# Patient Record
Sex: FEMALE | Race: BLACK OR AFRICAN AMERICAN | Employment: STUDENT | URBAN - NONMETROPOLITAN AREA
[De-identification: names, ages, dates, MRNs, and addresses within clinical notes are randomized per-mention and may not be internally consistent; named-entity substitution may affect disease eponyms.]

---
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
## 2021-06-24 ENCOUNTER — NURSE ONLY (OUTPATIENT)
Dept: CARDIOLOGY CLINIC | Age: 21
End: 2021-06-24
Payer: COMMERCIAL

## 2021-06-24 DIAGNOSIS — Z02.5 SPORTS PHYSICAL: Primary | ICD-10-CM

## 2021-06-24 PROCEDURE — 93000 ELECTROCARDIOGRAM COMPLETE: CPT | Performed by: INTERNAL MEDICINE

## 2021-07-21 ENCOUNTER — HOSPITAL ENCOUNTER (OUTPATIENT)
Dept: PHYSICAL THERAPY | Age: 21
Setting detail: THERAPIES SERIES
Discharge: HOME | End: 2021-07-21
Payer: COMMERCIAL

## 2021-07-21 PROCEDURE — L3020 FOOT LONGITUD/METATARSAL SUP: HCPCS

## 2021-07-21 NOTE — PLAN OF CARE
Jeet Langley  Phone: [phone]   Fax:     [phone]                                                       Physical Therapy Certification    Dear Referring Practitioner: Joaquin Larios MD,    We had the pleasure of evaluating the following patient for physical therapy services at [address] [place] [address]. A summary of our findings can be found in the initial assessment below. This includes our plan of care. If you have any questions or concerns regarding these findings, please do not hesitate to contact me at the office phone number checked above. Thank you for the referral.       Physician Signature:_______________________________Date:__________________  By signing above (or electronic signature), therapists plan is approved by physician      Patient: Katherine Buenrostro   : 2000   MRN: [id number]  Referring Physician: Referring Practitioner: Joaquin Larios MD      Evaluation Date: 2021      Medical Diagnosis Information:  Diagnosis: bilateral knee pain  Treatment Diagnosis: M76.50                                         Insurance information: PT Insurance Information: BCBS/AG/[place]     Precautions/ Contra-indications:   Latex Allergy:  [x]NO      []YES  Preferred Language for Healthcare:   [x]English       []other:  Functional Scale: LEFS 35%    SUBJECTIVE: Patient presents back to clinic stating that she is having increased bilateral knee pain after return to basketball workouts. She states that she had DN previously with good relief however, recently she has not had much treatment in the training room. She reports that her orthotics help significantly and that she has worn down the inside of the top cover and could use a new device. She admits that she has the most knee pain in left side recently, through patellar tendon. (ROS):  [x]Performed Review of systems (Integumentary, CardioPulmonary, Neurological) by intake and observation. Intake form has been scanned into medical record. Patient has been instructed to contact their primary care physician regarding ROS issues if not already being addressed at this time. Co-morbidities/Complexities (which will affect course of rehabilitation):   [x]None           Arthritic conditions   []Rheumatoid arthritis (M05.9)  []Osteoarthritis (M19.91)   Cardiovascular conditions   []Hypertension (I10)  []Hyperlipidemia (E78.5)  []Angina pectoris (I20)  []Atherosclerosis (I70)   Musculoskeletal conditions   []Disc pathology   []Congenital spine pathologies   []Prior surgical intervention  []Osteoporosis (M81.8)  []Osteopenia (M85.8)   Endocrine conditions   []Hypothyroid (E03.9)  []Hyperthyroid Gastrointestinal conditions   []Constipation (X95.09)   Metabolic conditions   []Morbid obesity (E66.01)  []Diabetes type 1(E10.65) or 2 (E11.65)   []Neuropathy (G60.9)     Pulmonary conditions   []Asthma (J45)  []Coughing   []COPD (J44.9)   Psychological Disorders  []Anxiety (F41.9)  []Depression (F32.9)   []Other:   []Other:        Barriers to/and or personal factors that will affect rehab potential:              []Age  []Sex              []Motivation/Lack of Motivation                        []Co-Morbidities              []Cognitive Function, education/learning barriers              []Environmental, home barriers              []profession/work barriers  []past PT/medical experience  []other:  Justification:     Falls Risk Assessment (30 days):   [x] Falls Risk assessed and no intervention required. [] Falls Risk assessed and Patient requires intervention due to being higher risk   TUG score (>12s at risk):     [] Falls education provided, including           ASSESSMENT: Patient demonstrates poor neuromuscular control of the pelvis and proximal hip.  She has developed compensatory patterns beginning at the foot ankle due to moderate over and accelerated pronation mechanics. She would benefit from fabrication of new custom device to address mechanics and improve kinetic chain efficiency and decrease risk of valgus knee injury. She would also benefit from some higher level NMR work to improve proximal control and DN to decrease inflammation of the knees.       Functional deficiencies/Impairments:     [x]Decreased core/proximal hip strength and neuromuscular control -Reduced overall functional level with mobility and lifting    []Noted foot/ankle hypo/hypermobility - Reduced overall functional level with mobility and gait    []Decreased LE functional strength- Reduced overall functional mobility   [x]Reduced balance/proprioceptive control- Reduced overall functional level with mobility and gait, possible falls risk   []Pain/difficulty with ambulation- Reduced overall functional mobility   [x]Unable to perform sport/recreational activity due to pain / dysfunction   [x]Foot biomechanics requiring correction:uncompensated forefoot varus    Classification :    [x]Signs/symptoms consistent with foot mechanics/gait dysfunction which would benefit from custom orthotic device fabrication    []Signs/symptoms consistent with functional hip weakness/NMR control      []Signs/symptoms consistent with tendinitis/tendinosis    [x]signs/symptoms consistent with pathology which requires additional therapeutic intervention in addition to custom orthotics       []other:      Prognosis/Rehab Potential:      [x]Excellent   []Good    []Fair   []Poor    Physical Therapy Evaluation Complexity Justification  [x] A history of present problem with:  [x] no personal factors and/or comorbidities that impact the plan of care;  []1-2 personal factors and/or comorbidities that impact the plan of care  []3 personal factors and/or comorbidities that impact the plan of care  [x] An examination of body systems using standardized tests and measures

## 2021-07-28 ENCOUNTER — HOSPITAL ENCOUNTER (OUTPATIENT)
Dept: PHYSICAL THERAPY | Age: 21
Setting detail: THERAPIES SERIES
Discharge: HOME | End: 2021-07-28
Payer: COMMERCIAL

## 2021-07-28 PROCEDURE — 97110 THERAPEUTIC EXERCISES: CPT

## 2021-07-28 PROCEDURE — 97161 PT EVAL LOW COMPLEX 20 MIN: CPT

## 2021-07-28 PROCEDURE — 20560 NDL INSJ W/O NJX 1 OR 2 MUSC: CPT

## 2021-07-28 NOTE — PLAN OF CARE
Jeet Langley  Phone: [phone]   Fax:     [phone]                                                       Physical Therapy Certification    Dear Referring Practitioner: Nathaniel Cullen MD,    We had the pleasure of evaluating the following patient for physical therapy services at [address] [place] [address]. A summary of our findings can be found in the initial assessment below. This includes our plan of care. If you have any questions or concerns regarding these findings, please do not hesitate to contact me at the office phone number checked above. Thank you for the referral.       Physician Signature:_______________________________Date:__________________  By signing above (or electronic signature), therapists plan is approved by physician      Patient: Stan Roberto   : 2000   MRN: [id number]  Referring Physician: Referring Practitioner: Nathaniel Cullen MD      Evaluation Date: 2021      Medical Diagnosis Information:  Diagnosis: bilateral knee PFD   Treatment Diagnosis: M25.561, M25.562                                         Insurance information: PT Insurance Information: BCBS/AG/[place] 60 visits     Precautions/ Contra-indications: none  Latex Allergy:  [x]NO      []YES  Preferred Language for Healthcare:   [x]English       []other:    C-SSRS Triggered by Intake questionnaire (Past 2 wk assessment ):   [x] No, Questionnaire did not trigger screening.   [] Yes, Patient intake triggered C-SSRS Screening      [] C-SSRS Screening completed  [] PCP notified via Epic     SUBJECTIVE: Patient stated complaint of bilateral knee pain for quite some time. 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  She reports that DN and treatment with ATC have helped in the past. We just evaluated her feet for new orthotic devices which help some with knee pain. Relevant Medical History:   Functional Scale/Score: LEFS 42%    Pain Scale: 6/10  Easing factors: rest, ice  Provocative factors: running, jumping, change of direction, stairs     Type: []Constant   [x]Intermittent  []Radiating []Localized []other:     Numbness/Tingling: none    Occupation/School: student    Living Status/Prior Level of Function: Independent with ADLs and IADLs,  Collegiate basketball    OBJECTIVE:     ROM LEFT RIGHT   HIP Flex [facility] WFL   HIP Abd [facility] WFL   HIP Ext [facility] WFL   HIP IR WFL WFL   HIP ER [facility] WFL   Knee ext     Knee Flex     Ankle PF     Ankle DF     Ankle In     Ankle Ev     Strength  LEFT RIGHT   HIP Flexors     HIP Abductors 4+/5 4+/5   HIP Ext     Hip ER     Knee EXT (quad) 5/5 with pain to test 5/5 with pain to test   Knee Flex (HS)     Ankle DF     Ankle PF     Ankle Inv     Ankle EV          Circumference  Mid apex  7 cm prox             Reflexes/Sensation:    [x]Dermatomes/Myotomes intact    [x]Reflexes equal and normal bilaterally   []Other:    Joint mobility: PF   []Normal    []Hypo   [x]Hyper    Palpation: tender through inferior pole of patella and quad tendon as well as through lateral quad and ITB    Functional Mobility/Transfers: deep squat - functional/non-painful, SLS - functional    Posture: WFL    Bandages/Dressings/Incisions:  NA    Gait: (include devices/WB status) normal    Orthopedic Special Tests:                        [x] Patient history, allergies, meds reviewed. Medical chart reviewed. See intake form. Review Of Systems (ROS):  [x]Performed Review of systems (Integumentary, CardioPulmonary, Neurological) by intake and observation. Intake form has been scanned into medical record. Patient has been instructed to contact their primary care physician regarding ROS issues if not already being addressed at this time.       Co-morbidities/Complexities (which will affect course of rehabilitation):   [x]None           Arthritic conditions   []Rheumatoid arthritis (M05.9)  []Osteoarthritis (M19.91)   Cardiovascular conditions   []Hypertension (I10)  []Hyperlipidemia (E78.5)  []Angina pectoris (I20)  []Atherosclerosis (I70)  []CVA Musculoskeletal conditions   []Disc pathology   []Congenital spine pathologies   []Prior surgical intervention  []Osteoporosis (M81.8)  []Osteopenia (M85.8)   Endocrine conditions   []Hypothyroid (E03.9)  []Hyperthyroid Gastrointestinal conditions   []Constipation (K60.71)   Metabolic conditions   []Morbid obesity (E66.01)  []Diabetes type 1(E10.65) or 2 (E11.65)   []Neuropathy (G60.9)     Pulmonary conditions   []Asthma (J45)  []Coughing   []COPD (J44.9)   Psychological Disorders  []Anxiety (F41.9)  []Depression (F32.9)   []Other:   []Other:          Barriers to/and or personal factors that will affect rehab potential:              []Age  []Sex    []Smoker              []Motivation/Lack of Motivation                        []Co-Morbidities              []Cognitive Function, education/learning barriers              []Environmental, home barriers              []profession/work barriers  []past PT/medical experience  []other:  Justification:     Falls Risk Assessment (30 days):   [x] Falls Risk assessed and no intervention required. [] Falls Risk assessed and Patient requires intervention due to being higher risk   TUG score (>12s at risk):     [] Falls education provided, including         ASSESSMENT: Patient presents with signs and symptoms consistent with bilateral patellar tendonitis and PF tracking issues. She would benefit from skilled PT services to address above stated limitations and improve functional abilities.      Functional Impairments:     []Noted lumbar/proximal hip/LE hypomobility   [x]Decreased LE functional ROM   [x]Decreased core/proximal hip strength and neuromuscular control   [x]Decreased LE functional strength   [x]Reduced balance/proprioceptive plan of care;  []1-2 personal factors and/or comorbidities that impact the plan of care  []3 personal factors and/or comorbidities that impact the plan of care  [x] An examination of body systems using standardized tests and measures addressing any of the following: body structures and functions (impairments), activity limitations, and/or participation restrictions;:  [x] a total of 1-2 or more elements   [] a total of 3 or more elements   [] a total of 4 or more elements   [x] A clinical presentation with:  [x] stable and/or uncomplicated characteristics   [] evolving clinical presentation with changing characteristics  [] unstable and unpredictable characteristics;   [x] Clinical decision making of [x] low, [] moderate, [] high complexity using standardized patient assessment instrument and/or measurable assessment of functional outcome. [x] EVAL (LOW) 68955 (typically 20 minutes face-to-face)  [] EVAL (MOD) 06650 (typically 30 minutes face-to-face)  [] EVAL (HIGH) 57132 (typically 45 minutes face-to-face)  [] RE-EVAL     PLAN:  Frequency/Duration:  2 days per week for 4 Weeks:  Interventions:  [x]  Therapeutic exercise including: strength training, ROM, for Lower extremity and core   [x]  NMR activation and proprioception for LE, Glutes and Core   [x]  Manual therapy as indicated for LE, Hip and spine to include: Dry Needling/IASTM, STM, PROM, Gr I-IV mobilizations, manipulation. [x] Modalities as needed that may include: thermal agents, E-stim, Biofeedback, US, iontophoresis as indicated  [x] Patient education on joint protection, postural re-education, activity modification, progression of HEP. HEP instruction: (see scanned forms)    GOALS:  Patient stated goal: pain free basketball  [] Progressing: [] Met: [] Not Met: [] Adjusted    Therapist goals for Patient:   Short Term Goals: To be achieved in: 2 weeks  1. Independent in HEP and progression per patient tolerance, in order to prevent re-injury. [] Progressing: [] Met: [] Not Met: [] Adjusted  2. Patient will have a decrease in pain to facilitate improvement in movement, function, and ADLs as indicated by Functional Deficits. [] Progressing: [] Met: [] Not Met: [] Adjusted    Long Term Goals: To be achieved in: 4-6 weeks  1. Disability index score of 5% or less for the LEFS to assist with reaching prior level of function. [] Progressing: [] Met: [] Not Met: [] Adjusted  2. Patient will demonstrate increased AROM to [facility] to allow for proper joint functioning as indicated by patients Functional Deficits. [] Progressing: [] Met: [] Not Met: [] Adjusted  3. Patient will demonstrate an increase in Strength to good proximal hip strength and control, within 5lb HHD in LE to allow for proper functional mobility as indicated by patients Functional Deficits. [] Progressing: [] Met: [] Not Met: [] Adjusted  4. Patient will return to running, jumping and change of direction functional activities without increased symptoms or restriction.    [] Progressing: [] Met: [] Not Met: [] Adjusted       Electronically signed by:  Yee Torres, PT

## 2021-07-28 NOTE — FLOWSHEET NOTE
[facility]  [address] [facility]  Phone: [phone] Fax: [phone]    Physical Therapy Treatment Note/ Progress Report:     Date:  2021    Patient Name:  Case Fernandes    :  2000  MRN: [id number]  Restrictions/Precautions:    Medical/Treatment Diagnosis Information:  · Diagnosis: bilateral knee PFD  · Treatment Diagnosis: M25.561, E90.710  Insurance/Certification information:  PT Insurance Information: BCBS/AG/[place] 60 visits  Physician Information:  Referring Practitioner: Hilda Tang MD  Plan of care signed (Y/N):     Date of Patient follow up with Physician:      Progress Report: []  Yes  []  No     Date Range for reporting period:  Beginnin2021  Ending:      Progress report due (10 Rx/or 30 days whichever is less):      Recertification due (POC duration/ or 90 days whichever is less): 10/28/2021     Visit # Insurance Allowable Auth Needed   1  []Yes   []No     Latex Allergy:  [x]NO      []YES  Preferred Language for Healthcare:   [x]English       []other:  Functional Scale: LEFS 42%  Date assessed:2021    Pain level:  4/10     SUBJECTIVE:  See eval    OBJECTIVE: See eval   Observation:    Test measurements:      RESTRICTIONS/PRECAUTIONS:     Exercises/Interventions:     Therapeutic Ex (50309)   Min: 10' Sets/sec Reps Notes/CUES   Retro Stepper/BIKE      Alter G      BFR      Sportcord March      3 way SLR      SAQ      Clam ABD      Hip Ext Curlie Bent      BOSU fwd/side lunge      BOSU squat      Leg Press ball squeeze 2 10    SLE with ER 2 10    Prone stretch 20\" 2    Glute side walks      RDL      Slide Lunge      Slide HS eccentrics      Step up      Swissball wall rolls- in SLS- hip drive      Quad hip ext/wall-ball rolls                  Manual Intervention (93018)  Min:      Knee mobs/PROM      Tib/Fem Mobs      Patella Mobs      Ankle mobs            DN 15'     NMR re-education (96685)  Min:   CUES NEEDED   Fijian/Biofeedback 10/10      BFR      G. Med activation      Hip Ext full ROM/ G. Activation      Bosu Bal and Prop- G Med      Single leg stance/Balance/Prop      Bosu Retro G. Med act      Prone Hip froggers- sliders/elevated            Therapeutic Activity (44891)  Min:      Ladders      Plyos      Dynamic Balance                        Spoke with   regarding the use of Dry Needling     Dry needling manual therapy: consisted on the placement of 7 needles in the following muscles:  lateral quad, ITB, rectus, inferior patella. A 30-60 mm needle was inserted, piston, rotated, and coned to produce intramuscular mobilization. These techniques were used to restore functional range of motion, reduce muscle spasm and induce healing in the corresponding musculature. (61989)  Clean Technique was utilized today while applying Dry needling treatment. The treatment sites where cleaned with 70% solution of  isopropyl alcohol . The PT washed their hands and utilized treatment gloves along with hand  prior to inserting the needles. All needles where removed and discarded in the appropriate sharps container. MD has given verbal and/or written approval for this treatment. Attended low frequency (1-20Hz) electrical stimulation was utilized in conjunction with Dry Needling:  the Estim was manipulated between all above needles for a period of 10 min. at 4-6 volts. The low frequency electrical stimulation was used to help reduce muscle spasm and help to interrupt /[place] the pain cycle. (98956)       Therapeutic Exercise and NMR EXR  [x] (00496) Provided verbal/tactile cueing for activities related to strengthening, flexibility, endurance, ROM for improvements in LE, proximal hip, and core control with self care, mobility, lifting, ambulation.   [x] (08111) Provided verbal/tactile cueing for activities related to improving balance, coordination, kinesthetic sense, posture, motor skill, proprioception  to assist with LE, proximal hip, and core control in self care, mobility, lifting, ambulation and eccentric single leg control. NMR and Therapeutic Activities:    [x] (30468 or 18492) Provided verbal/tactile cueing for activities related to improving balance, coordination, kinesthetic sense, posture, motor skill, proprioception and motor activation to allow for proper function of core, proximal hip and LE with self care and ADLs and functional mobility.   [] (76256) Gait Re-education- Provided training and instruction to the patient for proper LE, core and proximal hip recruitment and positioning and eccentric body weight control with ambulation re-education including up and down stairs     Home Exercise Program:    [x] (46731) Reviewed/Progressed HEP activities related to strengthening, flexibility, endurance, ROM of core, proximal hip and LE for functional self-care, mobility, lifting and ambulation/stair navigation   [] (68640)Reviewed/Progressed HEP activities related to improving balance, coordination, kinesthetic sense, posture, motor skill, proprioception of core, proximal hip and LE for self care, mobility, lifting, and ambulation/stair navigation      Manual Treatments:  PROM / STM / Oscillations-Mobs:  G-I, II, III, IV (PA's, Inf., Post.)  [] (17389) Provided manual therapy to mobilize LE, proximal hip and/or LS spine soft tissue/joints for the purpose of modulating pain, promoting relaxation,  increasing ROM, reducing/eliminating soft tissue swelling/inflammation/restriction, improving soft tissue extensibility and allowing for proper ROM for normal function with self care, mobility, lifting and ambulation. Modalities:     [] GAME READY (VASO)- for significant edema, swelling, pain control.      Charges:  Timed Code Treatment Minutes: 25   Total Treatment Minutes: 45      [x] EVAL (LOW) 20276 (typically 20 minutes face-to-face)  [] EVAL (MOD) 41892 (typically 30 minutes face-to-face)  [] EVAL (HIGH) 24042 (typically 45 minutes face-to-face)  [] RE-EVAL     [x] PB(22478) x     [x] DRY NEEDLE 1 OR 2 MUSCLES  [] NMR (99713) x     [] DRY NEEDLE 3+ MUSCLES  [] Manual (94214) x       [] TA (49216) x     [] Mech Traction (74917)  [] ES(attended) (20934)     [] ES (un) (53061):   [] VASO (00477)  [] Other:    If BWC Please Indicate Time In/Out  CPT Code Time in Time out                                   GOALS:  Patient stated goal: pain free basketball  [] Progressing: [] Met: [] Not Met: [] Adjusted    Therapist goals for Patient:   Short Term Goals: To be achieved in: 2 weeks  1. Independent in HEP and progression per patient tolerance, in order to prevent re-injury. [] Progressing: [] Met: [] Not Met: [] Adjusted  2. Patient will have a decrease in pain to facilitate improvement in movement, function, and ADLs as indicated by Functional Deficits. [] Progressing: [] Met: [] Not Met: [] Adjusted    Long Term Goals: To be achieved in: 4-6 weeks  1. Disability index score of 5% or less for the LEFS to assist with reaching prior level of function. [] Progressing: [] Met: [] Not Met: [] Adjusted  2. Patient will demonstrate increased AROM to [facility] to allow for proper joint functioning as indicated by patients Functional Deficits. [] Progressing: [] Met: [] Not Met: [] Adjusted  3. Patient will demonstrate an increase in Strength to good proximal hip strength and control, within 5lb HHD in LE to allow for proper functional mobility as indicated by patients Functional Deficits. [] Progressing: [] Met: [] Not Met: [] Adjusted  4. Patient will return to running, jumping and change of direction functional activities without increased symptoms or restriction.    [] Progressing: [] Met: [] Not Met: [] Adjusted    ASSESSMENT:  See eval    Return to Play: (if applicable)   []  Stage 1: Intro to Strength   []  Stage 2: Return to Run and Strength   []  Stage 3: Return to Jump and Strength   [] Stage 4: Dynamic Strength and Agility   []  Stage 5: Sport Specific Training     []  Ready to Return to Play, Meets All Above Stages   []  Not Ready for Return to Sports   Comments:            Treatment/Activity Tolerance:  [x] Patient tolerated treatment well [] Patient limited by fatique  [] Patient limited by pain  [] Patient limited by other medical complications  [] Other:     Overall Progression Towards Functional goals/ Treatment Progress Update:  [] Patient is progressing as expected towards functional goals listed. [] Progression is slowed due to complexities/Impairments listed. [] Progression has been slowed due to co-morbidities. [x] Plan just implemented, too soon to assess goals progression <30days   [] Goals require adjustment due to lack of progress  [] Patient is not progressing as expected and requires additional follow up with physician  [] Other    Prognosis for POC: [x] Good [] Fair  [] Poor    Patient requires continued skilled intervention: [x] Yes  [] No        PLAN: See eval  [] Continue per plan of care [] Alter current plan (see comments)  [x] Plan of care initiated [] Hold pending MD visit [] Discharge    Electronically signed by: Nydia Marin PT    Note: If patient does not return for scheduled/recommended follow up visits, this note will serve as a discharge from care along with the most recent update on progress.

## 2021-07-30 ENCOUNTER — APPOINTMENT (OUTPATIENT)
Dept: PHYSICAL THERAPY | Age: 21
End: 2021-07-30
Payer: COMMERCIAL

## 2021-08-02 ENCOUNTER — HOSPITAL ENCOUNTER (OUTPATIENT)
Dept: PHYSICAL THERAPY | Age: 21
Setting detail: THERAPIES SERIES
Discharge: HOME | End: 2021-08-02
Payer: COMMERCIAL

## 2021-08-02 PROCEDURE — 97110 THERAPEUTIC EXERCISES: CPT

## 2021-08-02 PROCEDURE — 97140 MANUAL THERAPY 1/> REGIONS: CPT

## 2021-08-02 NOTE — FLOWSHEET NOTE
[facility] [address] [facility] [address]  Phone: [phone] Fax: [phone]    Physical Therapy Treatment Note/ Progress Report:     Date:  2021    Patient Name:  Casey Edwards    :  2000  MRN: [id number]  Restrictions/Precautions:    Medical/Treatment Diagnosis Information:  · Diagnosis: bilateral knee PFD  · Treatment Diagnosis: M25.561, X49.049  Insurance/Certification information:  PT Insurance Information: BCBS/AG/[place] 60 visits  Physician Information:  Referring Practitioner: Doreen Zhao MD  Plan of care signed (Y/N):     Date of Patient follow up with Physician:      Progress Report: []  Yes  []  No     Date Range for reporting period:  Beginnin2021  Ending:      Progress report due (10 Rx/or 30 days whichever is less): 0/10/8703     Recertification due (POC duration/ or 90 days whichever is less): 10/28/2021     Visit # Insurance Allowable Auth Needed   2  []Yes   []No     Latex Allergy:  [x]NO      []YES  Preferred Language for Healthcare:   [x]English       []other:  Functional Scale: LEFS 42%  Date assessed:2021    Pain level:  4/10     SUBJECTIVE:  Patient reports DN was helpful to left knee and that the taping technique was helpful during play. She is eager to [place] today and hold on DN until she returns from home.      OBJECTIVE: tender through left lateral knee and inferior patellar pole   Observation:    Test measurements:      RESTRICTIONS/PRECAUTIONS:     Exercises/Interventions:     Therapeutic Ex (53428)   Min: 20' Sets/sec Reps Notes/CUES   Retro Stepper/BIKE      Alter G      BFR      Sportcord March      3 way SLR      SAQ      Clam ABD      Hip Ext /table      BOSU fwd lunge 1 10 5\"   BOSU squat      Leg Press ball squeeze 2 10    SLE with ER 2 10    Prone stretch 20\" 2    Glute side walks      RDL (SL) 2 10 10#   Box step up 2 10    SLS with TKE on airex 3 10 With toss    Step up strengthening, flexibility, endurance, ROM for improvements in LE, proximal hip, and core control with self care, mobility, lifting, ambulation. [x] (85005) Provided verbal/tactile cueing for activities related to improving balance, coordination, kinesthetic sense, posture, motor skill, proprioception  to assist with LE, proximal hip, and core control in self care, mobility, lifting, ambulation and eccentric single leg control.      NMR and Therapeutic Activities:    [x] (22725 or 03943) Provided verbal/tactile cueing for activities related to improving balance, coordination, kinesthetic sense, posture, motor skill, proprioception and motor activation to allow for proper function of core, proximal hip and LE with self care and ADLs and functional mobility.   [] (28224) Gait Re-education- Provided training and instruction to the patient for proper LE, core and proximal hip recruitment and positioning and eccentric body weight control with ambulation re-education including up and down stairs     Home Exercise Program:    [x] (21322) Reviewed/Progressed HEP activities related to strengthening, flexibility, endurance, ROM of core, proximal hip and LE for functional self-care, mobility, lifting and ambulation/stair navigation   [] (69301)Reviewed/Progressed HEP activities related to improving balance, coordination, kinesthetic sense, posture, motor skill, proprioception of core, proximal hip and LE for self care, mobility, lifting, and ambulation/stair navigation      Manual Treatments:  PROM / STM / Oscillations-Mobs:  G-I, II, III, IV (PA's, Inf., Post.)  [x] (75354) Provided manual therapy to mobilize LE, proximal hip and/or LS spine soft tissue/joints for the purpose of modulating pain, promoting relaxation,  increasing ROM, reducing/eliminating soft tissue swelling/inflammation/restriction, improving soft tissue extensibility and allowing for proper ROM for normal function with self care, mobility, lifting and ambulation. Modalities:     [] GAME READY (VASO)- for significant edema, swelling, pain control. Charges:  Timed Code Treatment Minutes: 45   Total Treatment Minutes: 45      [] EVAL (LOW) 73131 (typically 20 minutes face-to-face)  [] EVAL (MOD) 98724 (typically 30 minutes face-to-face)  [] EVAL (HIGH) 59008 (typically 45 minutes face-to-face)  [] RE-EVAL     [x] LD(13687) x 1    [] DRY NEEDLE 1 OR 2 MUSCLES  [] NMR (28480) x     [] DRY NEEDLE 3+ MUSCLES  [x] Manual (52592) x    2   [] TA (43755) x     [] Mech Traction (44509)  [] ES(attended) (25502)     [] ES (un) (34573):   [] VASO (27527)  [] Other:    If BWC Please Indicate Time In/Out  CPT Code Time in Time out                                   GOALS:  Patient stated goal: pain free basketball  [] Progressing: [] Met: [] Not Met: [] Adjusted    Therapist goals for Patient:   Short Term Goals: To be achieved in: 2 weeks  1. Independent in HEP and progression per patient tolerance, in order to prevent re-injury. [] Progressing: [] Met: [] Not Met: [] Adjusted  2. Patient will have a decrease in pain to facilitate improvement in movement, function, and ADLs as indicated by Functional Deficits. [] Progressing: [] Met: [] Not Met: [] Adjusted    Long Term Goals: To be achieved in: 4-6 weeks  1. Disability index score of 5% or less for the LEFS to assist with reaching prior level of function. [] Progressing: [] Met: [] Not Met: [] Adjusted  2. Patient will demonstrate increased [facility] to allow for proper joint functioning as indicated by patients Functional Deficits. [] Progressing: [] Met: [] Not Met: [] Adjusted  3. Patient will demonstrate an increase in Strength to good proximal hip strength and control, within 5lb HHD in LE to allow for proper functional mobility as indicated by patients Functional Deficits. [] Progressing: [] Met: [] Not Met: [] Adjusted  4.  Patient will return to running, jumping and change of direction functional activities without increased symptoms or restriction. [] Progressing: [] Met: [] Not Met: [] Adjusted    ASSESSMENT:  Patient is making good early gains with therapy and should make continued progress with further therapy. Return to Play: (if applicable)   []  Stage 1: Intro to Strength   []  Stage 2: Return to Run and Strength   []  Stage 3: Return to Jump and Strength   []  Stage 4: Dynamic Strength and Agility   []  Stage 5: Sport Specific Training     []  Ready to Return to Play, Meets All Above Stages   []  Not Ready for Return to Sports   Comments:            Treatment/Activity Tolerance:  [x] Patient tolerated treatment well [] Patient limited by fatique  [] Patient limited by pain  [] Patient limited by other medical complications  [] Other:     Overall Progression Towards Functional goals/ Treatment Progress Update:  [x] Patient is progressing as expected towards functional goals listed. [] Progression is slowed due to complexities/Impairments listed. [] Progression has been slowed due to co-morbidities. [] Plan just implemented, too soon to assess goals progression <30days   [] Goals require adjustment due to lack of progress  [] Patient is not progressing as expected and requires additional follow up with physician  [] Other    Prognosis for POC: [x] Good [] Fair  [] Poor    Patient requires continued skilled intervention: [x] Yes  [] No        PLAN:   [x] Continue per plan of care [] Alter current plan (see comments)  [] Plan of care initiated [] Hold pending MD visit [] Discharge    Electronically signed by: Tanika Fay PT    Note: If patient does not return for scheduled/recommended follow up visits, this note will serve as a discharge from care along with the most recent update on progress.

## 2021-09-08 ENCOUNTER — APPOINTMENT (OUTPATIENT)
Dept: PHYSICAL THERAPY | Age: 21
End: 2021-09-08
Payer: COMMERCIAL

## 2021-09-15 ENCOUNTER — APPOINTMENT (OUTPATIENT)
Dept: PHYSICAL THERAPY | Age: 21
End: 2021-09-15
Payer: COMMERCIAL

## 2021-09-15 ENCOUNTER — HOSPITAL ENCOUNTER (OUTPATIENT)
Dept: PHYSICAL THERAPY | Age: 21
Setting detail: THERAPIES SERIES
Discharge: HOME | End: 2021-09-15
Payer: COMMERCIAL

## 2021-09-15 PROCEDURE — 97140 MANUAL THERAPY 1/> REGIONS: CPT

## 2021-09-15 PROCEDURE — 97110 THERAPEUTIC EXERCISES: CPT

## 2021-09-15 NOTE — FLOWSHEET NOTE
[place] [address] [address]  Phone: [phone] Fax: [phone]    Physical Therapy Treatment Note/ Progress Report:     Date:  9/15/2021    Patient Name:  Unruly Connors    :  2000  MRN: [id number]  Restrictions/Precautions:    Medical/Treatment Diagnosis Information:  · Diagnosis: bilateral knee PFD  · Treatment Diagnosis: M25.561, S15.950  Insurance/Certification information:  PT Insurance Information: BCBS/AG/[place] 60 visits  Physician Information:  Referring Practitioner: Tim Low MD  Plan of care signed (Y/N):     Date of Patient follow up with Physician:      Progress Report: []  Yes  []  No     Date Range for reporting period:  Beginnin2021  Ending:      Progress report due (10 Rx/or 30 days whichever is less): 1939     Recertification due (POC duration/ or 90 days whichever is less): 10/28/2021     Visit # Insurance Allowable Auth Needed   3  []Yes   []No     Latex Allergy:  [x]NO      []YES  Preferred Language for Healthcare:   [x]English       []other:  Functional Scale: LEFS 42%  Date assessed:2021    Pain level:  4/10     SUBJECTIVE: Patient reports that she is sore in both quad and patella tendon entering PT today.      OBJECTIVE: tender through left lateral knee and inferior patellar pole   Observation:    Test measurements:      RESTRICTIONS/PRECAUTIONS:     Exercises/Interventions:     Therapeutic Ex (44332)   Min: 20' Sets/sec Reps Notes/CUES   Retro Stepper/BIKE      Alter G      BFR      Sportcord March      3 way SLR      SAQ      Clam ABD      Hip Ext /table      BOSU fwd lunge 1 10 5\"   BOSU bridge  2 10 5\"   Leg Press  2 10 ecc 80#   SLE with ER 2 10    Prone stretch 20\" 2    Glute side walks 2 10 [place]   RDL (SL) 2 10 10#   Box step up 2 10    SLS with TKE on airex 3 10 With toss    Step up      Swissball wall rolls- in SLS- hip drive      Quad hip ext/wall-ball rolls Manual Intervention (18288)  Min: 25'      Knee mobs/PROM/GISTM 25'  [place] taping (medial glide)   Tib/Fem Mobs      Patella Mobs      Ankle mobs            DN      NMR re-education (37199)  Min:   CUES NEEDED   Turkmen/Biofeedback 10/10      BFR      G. Med activation      Hip Ext full ROM/ G. Activation      Bosu Bal and Prop- G Med      Single leg stance/Balance/Prop      Bosu Retro G. Med act      Prone Hip froggers- sliders/elevated            Therapeutic Activity (58301)  Min:      Mobilitrix      Dynamic Balance                        Spoke with   regarding the use of Dry Needling     Dry needling manual therapy: consisted on the placement of 7 needles in the following muscles:  lateral quad, ITB, rectus, inferior patella. A 30-60 mm needle was inserted, piston, rotated, and coned to produce intramuscular mobilization. These techniques were used to restore functional range of motion, reduce muscle spasm and induce healing in the corresponding musculature. (80902)  Clean Technique was utilized today while applying Dry needling treatment. The treatment sites where cleaned with 70% solution of  isopropyl alcohol . The PT washed their hands and utilized treatment gloves along with hand  prior to inserting the needles. All needles where removed and discarded in the appropriate sharps container. MD has given verbal and/or written approval for this treatment. Attended low frequency (1-20Hz) electrical stimulation was utilized in conjunction with Dry Needling:  the Estim was manipulated between all above needles for a period of 10 min. at 4-6 volts. The low frequency electrical stimulation was used to help reduce muscle spasm and help to interrupt /[place] the pain cycle.  (74614)       Therapeutic Exercise and NMR EXR  [x] (74873) Provided verbal/tactile cueing for activities related to strengthening, flexibility, endurance, ROM for improvements in LE, proximal hip, and core control with self care, mobility, lifting, ambulation. [x] (87641) Provided verbal/tactile cueing for activities related to improving balance, coordination, kinesthetic sense, posture, motor skill, proprioception  to assist with LE, proximal hip, and core control in self care, mobility, lifting, ambulation and eccentric single leg control. NMR and Therapeutic Activities:    [x] (23991 or 50786) Provided verbal/tactile cueing for activities related to improving balance, coordination, kinesthetic sense, posture, motor skill, proprioception and motor activation to allow for proper function of core, proximal hip and LE with self care and ADLs and functional mobility.   [] (02143) Gait Re-education- Provided training and instruction to the patient for proper LE, core and proximal hip recruitment and positioning and eccentric body weight control with ambulation re-education including up and down stairs     Home Exercise Program:    [x] (98881) Reviewed/Progressed HEP activities related to strengthening, flexibility, endurance, ROM of core, proximal hip and LE for functional self-care, mobility, lifting and ambulation/stair navigation   [] (34677)Reviewed/Progressed HEP activities related to improving balance, coordination, kinesthetic sense, posture, motor skill, proprioception of core, proximal hip and LE for self care, mobility, lifting, and ambulation/stair navigation      Manual Treatments:  PROM / STM / Oscillations-Mobs:  G-I, II, III, IV (PA's, Inf., Post.)  [x] (80162) Provided manual therapy to mobilize LE, proximal hip and/or LS spine soft tissue/joints for the purpose of modulating pain, promoting relaxation,  increasing ROM, reducing/eliminating soft tissue swelling/inflammation/restriction, improving soft tissue extensibility and allowing for proper ROM for normal function with self care, mobility, lifting and ambulation.      Modalities:     [] GAME READY (VASO)- for significant edema, swelling, pain control. Charges:  Timed Code Treatment Minutes: 45   Total Treatment Minutes: 45      [] EVAL (LOW) 52299 (typically 20 minutes face-to-face)  [] EVAL (MOD) 15504 (typically 30 minutes face-to-face)  [] EVAL (HIGH) 28229 (typically 45 minutes face-to-face)  [] RE-EVAL     [x] TD(43922) x 1    [] DRY NEEDLE 1 OR 2 MUSCLES  [] NMR (29573) x     [] DRY NEEDLE 3+ MUSCLES  [x] Manual (41549) x    2   [] TA (60567) x     [] Mech Traction (27658)  [] ES(attended) (27919)     [] ES (un) (49860):   [] VASO (68391)  [] Other:    If BWC Please Indicate Time In/Out  CPT Code Time in Time out                                   GOALS:  Patient stated goal: pain free basketball  [] Progressing: [] Met: [] Not Met: [] Adjusted    Therapist goals for Patient:   Short Term Goals: To be achieved in: 2 weeks  1. Independent in HEP and progression per patient tolerance, in order to prevent re-injury. [] Progressing: [] Met: [] Not Met: [] Adjusted  2. Patient will have a decrease in pain to facilitate improvement in movement, function, and ADLs as indicated by Functional Deficits. [] Progressing: [] Met: [] Not Met: [] Adjusted    Long Term Goals: To be achieved in: 4-6 weeks  1. Disability index score of 5% or less for the LEFS to assist with reaching prior level of function. [] Progressing: [] Met: [] Not Met: [] Adjusted  2. Patient will demonstrate increased AROM to [facility] to allow for proper joint functioning as indicated by patients Functional Deficits. [] Progressing: [] Met: [] Not Met: [] Adjusted  3. Patient will demonstrate an increase in Strength to good proximal hip strength and control, within 5lb HHD in LE to allow for proper functional mobility as indicated by patients Functional Deficits. [] Progressing: [] Met: [] Not Met: [] Adjusted  4. Patient will return to running, jumping and change of direction functional activities without increased symptoms or restriction.    [] Progressing: [] Met: [] Not Met: [] Adjusted    ASSESSMENT:  Patient tolerated treatment well. Appropriately fatigued at conclusion. Some soreness noted primarily on left with step up, improved with cues for glute activation and femoral IR control. Return to Play: (if applicable)   []  Stage 1: Intro to Strength   []  Stage 2: Return to Run and Strength   []  Stage 3: Return to Jump and Strength   []  Stage 4: Dynamic Strength and Agility   []  Stage 5: Sport Specific Training     []  Ready to Return to Play, Meets All Above Stages   []  Not Ready for Return to Sports   Comments:            Treatment/Activity Tolerance:  [x] Patient tolerated treatment well [] Patient limited by fatique  [] Patient limited by pain  [] Patient limited by other medical complications  [] Other:     Overall Progression Towards Functional goals/ Treatment Progress Update:  [x] Patient is progressing as expected towards functional goals listed. [] Progression is slowed due to complexities/Impairments listed. [] Progression has been slowed due to co-morbidities. [] Plan just implemented, too soon to assess goals progression <30days   [] Goals require adjustment due to lack of progress  [] Patient is not progressing as expected and requires additional follow up with physician  [] Other    Prognosis for POC: [x] Good [] Fair  [] Poor    Patient requires continued skilled intervention: [x] Yes  [] No        PLAN:   [x] Continue per plan of care [] Alter current plan (see comments)  [] Plan of care initiated [] Hold pending MD visit [] Discharge    Electronically signed by: Carola Goode PTA    Note: If patient does not return for scheduled/recommended follow up visits, this note will serve as a discharge from care along with the most recent update on progress.

## 2021-09-17 ENCOUNTER — HOSPITAL ENCOUNTER (OUTPATIENT)
Dept: PHYSICAL THERAPY | Age: 21
Setting detail: THERAPIES SERIES
Discharge: HOME | End: 2021-09-17
Payer: COMMERCIAL

## 2021-09-17 PROCEDURE — 97140 MANUAL THERAPY 1/> REGIONS: CPT

## 2021-09-17 PROCEDURE — 97110 THERAPEUTIC EXERCISES: CPT

## 2021-09-17 NOTE — FLOWSHEET NOTE
[place] [address] [address]  Phone: [phone] Fax: [phone]    Physical Therapy Treatment Note/ Progress Report:     Date:  2021    Patient Name:  Lalo Doll    :  2000  MRN: [id number]  Restrictions/Precautions:    Medical/Treatment Diagnosis Information:  · Diagnosis: bilateral knee PFD  · Treatment Diagnosis: M25.561, Y10.312  Insurance/Certification information:  PT Insurance Information: BCBS/AG/[place] 60 visits  Physician Information:  Referring Practitioner: Nancy Broussard MD  Plan of care signed (Y/N):     Date of Patient follow up with Physician:      Progress Report: []  Yes  []  No     Date Range for reporting period:  Beginnin2021  Ending:      Progress report due (10 Rx/or 30 days whichever is less):      Recertification due (POC duration/ or 90 days whichever is less): 10/28/2021     Visit # Insurance Allowable Auth Needed   4  []Yes   []No     Latex Allergy:  [x]NO      []YES  Preferred Language for Healthcare:   [x]English       []other:  Functional Scale: LEFS 42%  Date assessed:2021    Pain level:  4/10     SUBJECTIVE: Patient states that she felt better after last treatment. She is having some soreness through inferior pole of patella.       OBJECTIVE: tender through left lateral knee and inferior patellar pole   Observation:    Test measurements:      RESTRICTIONS/PRECAUTIONS:     Exercises/Interventions:     Therapeutic Ex (61608)   Min: 20' Sets/sec Reps Notes/CUES   Retro Stepper/BIKE      Alter G      BFR      Sportcord March      3 way SLR      SAQ      Hip ext on leg press 2 10 30#   Hip Ext /table      BOSU fwd lunge 5\"   BOSU bridge  2 10 5\"   Leg Press  2 10 ecc 130#/slight turn out   SLE with ER 2 10    Prone stretch 20\" 2    Glute side walks [place]   RDL (SL) 10#   Box step up    SLS with TKE on airex 3 10 With toss    Step up      Swissball wall rolls- in SLS- hip drive      Quad hip ext/wall-ball rolls                  Manual Intervention (40866)  Min: 25'      Knee mobs/PROM/GISTM 25'  Ayers taping (medial glide)   Tib/Fem Mobs      Patella Mobs      Ankle mobs            DN      NMR re-education (75837)  Min:   CUES NEEDED   [place]/Biofeedback 10/10      BFR      G. Med activation      Hip Ext full ROM/ G. Activation      Bosu Bal and Prop- G Med      Single leg stance/Balance/Prop      Bosu Retro G. Med act      Prone Hip froggers- sliders/elevated            Therapeutic Activity (30804)  Min:      Epic!os      Dynamic Balance                        Spoke with   regarding the use of Dry Needling     Dry needling manual therapy: consisted on the placement of 7 needles in the following muscles:  lateral quad, ITB, rectus, inferior patella. A 30-60 mm needle was inserted, piston, rotated, and coned to produce intramuscular mobilization. These techniques were used to restore functional range of motion, reduce muscle spasm and induce healing in the corresponding musculature. (89700)  Clean Technique was utilized today while applying Dry needling treatment. The treatment sites where cleaned with 70% solution of  isopropyl alcohol . The PT washed their hands and utilized treatment gloves along with hand  prior to inserting the needles. All needles where removed and discarded in the appropriate sharps container. MD has given verbal and/or written approval for this treatment. Attended low frequency (1-20Hz) electrical stimulation was utilized in conjunction with Dry Needling:  the Estim was manipulated between all above needles for a period of 10 min. at 4-6 volts. The low frequency electrical stimulation was used to help reduce muscle spasm and help to interrupt /Junction the pain cycle.  (13696)       Therapeutic Exercise and NMR EXR  [x] (62844) Provided verbal/tactile cueing for activities related to strengthening, flexibility, endurance, ROM for improvements in LE, proximal hip, and core control with self care, mobility, lifting, ambulation. [x] (04905) Provided verbal/tactile cueing for activities related to improving balance, coordination, kinesthetic sense, posture, motor skill, proprioception  to assist with LE, proximal hip, and core control in self care, mobility, lifting, ambulation and eccentric single leg control. NMR and Therapeutic Activities:    [x] (53148 or 98794) Provided verbal/tactile cueing for activities related to improving balance, coordination, kinesthetic sense, posture, motor skill, proprioception and motor activation to allow for proper function of core, proximal hip and LE with self care and ADLs and functional mobility.   [] (67135) Gait Re-education- Provided training and instruction to the patient for proper LE, core and proximal hip recruitment and positioning and eccentric body weight control with ambulation re-education including up and down stairs     Home Exercise Program:    [x] (58094) Reviewed/Progressed HEP activities related to strengthening, flexibility, endurance, ROM of core, proximal hip and LE for functional self-care, mobility, lifting and ambulation/stair navigation   [] (50077)Reviewed/Progressed HEP activities related to improving balance, coordination, kinesthetic sense, posture, motor skill, proprioception of core, proximal hip and LE for self care, mobility, lifting, and ambulation/stair navigation      Manual Treatments:  PROM / STM / Oscillations-Mobs:  G-I, II, III, IV (PA's, Inf., Post.)  [x] (54512) Provided manual therapy to mobilize LE, proximal hip and/or LS spine soft tissue/joints for the purpose of modulating pain, promoting relaxation,  increasing ROM, reducing/eliminating soft tissue swelling/inflammation/restriction, improving soft tissue extensibility and allowing for proper ROM for normal function with self care, mobility, lifting and ambulation.      Modalities: [] GAME READY (VASO)- for significant edema, swelling, pain control. Charges:  Timed Code Treatment Minutes: 45   Total Treatment Minutes: 45      [] EVAL (LOW) 76882 (typically 20 minutes face-to-face)  [] EVAL (MOD) 62667 (typically 30 minutes face-to-face)  [] EVAL (HIGH) 20247 (typically 45 minutes face-to-face)  [] RE-EVAL     [x] ZG(23376) x 1    [] DRY NEEDLE 1 OR 2 MUSCLES  [] NMR (99114) x     [] DRY NEEDLE 3+ MUSCLES  [x] Manual (87127) x    2   [] TA (42824) x     [] Mech Traction (99180)  [] ES(attended) (51854)     [] ES (un) (07356):   [] VASO (30806)  [] Other:    If BWC Please Indicate Time In/Out  CPT Code Time in Time out                                   GOALS:  Patient stated goal: pain free basketball  [] Progressing: [] Met: [] Not Met: [] Adjusted    Therapist goals for Patient:   Short Term Goals: To be achieved in: 2 weeks  1. Independent in HEP and progression per patient tolerance, in order to prevent re-injury. [] Progressing: [] Met: [] Not Met: [] Adjusted  2. Patient will have a decrease in pain to facilitate improvement in movement, function, and ADLs as indicated by Functional Deficits. [] Progressing: [] Met: [] Not Met: [] Adjusted    Long Term Goals: To be achieved in: 4-6 weeks  1. Disability index score of 5% or less for the LEFS to assist with reaching prior level of function. [] Progressing: [] Met: [] Not Met: [] Adjusted  2. Patient will demonstrate [facility] to allow for proper joint functioning as indicated by patients Functional Deficits. [] Progressing: [] Met: [] Not Met: [] Adjusted  3. Patient will demonstrate an increase in Strength to good proximal hip strength and control, within 5lb HHD in LE to allow for proper functional mobility as indicated by patients Functional Deficits. [] Progressing: [] Met: [] Not Met: [] Adjusted  4.  Patient will return to running, jumping and change of direction functional activities without increased symptoms or restriction. [] Progressing: [] Met: [] Not Met: [] Adjusted    ASSESSMENT:  Patient tolerated treatment well. Strength deficits remain through VMO. Return to Play: (if applicable)   []  Stage 1: Intro to Strength   []  Stage 2: Return to Run and Strength   []  Stage 3: Return to Jump and Strength   []  Stage 4: Dynamic Strength and Agility   []  Stage 5: Sport Specific Training     []  Ready to Return to Play, Meets All Above Stages   []  Not Ready for Return to Sports   Comments:            Treatment/Activity Tolerance:  [x] Patient tolerated treatment well [] Patient limited by fatique  [] Patient limited by pain  [] Patient limited by other medical complications  [] Other:     Overall Progression Towards Functional goals/ Treatment Progress Update:  [x] Patient is progressing as expected towards functional goals listed. [] Progression is slowed due to complexities/Impairments listed. [] Progression has been slowed due to co-morbidities. [] Plan just implemented, too soon to assess goals progression <30days   [] Goals require adjustment due to lack of progress  [] Patient is not progressing as expected and requires additional follow up with physician  [] Other    Prognosis for POC: [x] Good [] Fair  [] Poor    Patient requires continued skilled intervention: [x] Yes  [] No        PLAN:   [x] Continue per plan of care [] Alter current plan (see comments)  [] Plan of care initiated [] Hold pending MD visit [] Discharge    Electronically signed by: Kong Kelley PT    Note: If patient does not return for scheduled/recommended follow up visits, this note will serve as a discharge from care along with the most recent update on progress.

## 2021-09-20 ENCOUNTER — HOSPITAL ENCOUNTER (OUTPATIENT)
Dept: PHYSICAL THERAPY | Age: 21
Setting detail: THERAPIES SERIES
Discharge: HOME | End: 2021-09-20
Payer: COMMERCIAL

## 2021-09-20 PROCEDURE — 97110 THERAPEUTIC EXERCISES: CPT

## 2021-09-20 PROCEDURE — 97140 MANUAL THERAPY 1/> REGIONS: CPT

## 2021-09-20 NOTE — FLOWSHEET NOTE
[place]  [address] [address]  Phone: [phone] Fax: [phone]    Physical Therapy Treatment Note/ Progress Report:     Date:  2021    Patient Name:  Sidny Tyson    :  2000  MRN: [id number]  Restrictions/Precautions:    Medical/Treatment Diagnosis Information:  · Diagnosis: bilateral knee PFD  · Treatment Diagnosis: M25.561, Z31.383  Insurance/Certification information:  PT Insurance Information: BCBS/AG/[place] 60 visits  Physician Information:  Referring Practitioner: Jhonatan Lewis MD  Plan of care signed (Y/N):     Date of Patient follow up with Physician:      Progress Report: []  Yes  []  No     Date Range for reporting period:  Beginnin2021  Ending:      Progress report due (10 Rx/or 30 days whichever is less):      Recertification due (POC duration/ or 90 days whichever is less): 10/28/2021     Visit # Insurance Allowable Auth Needed   5  []Yes   []No     Latex Allergy:  [x]NO      []YES  Preferred Language for Healthcare:   [x]English       []other:  Functional Scale: LEFS 42%  Date assessed:2021    Pain level:  4/10     SUBJECTIVE: Patient reports that her knees are sore today from jumping at practice this am.     OBJECTIVE: tender through left lateral knee and inferior patellar pole   Observation:    Test measurements:      RESTRICTIONS/PRECAUTIONS:     Exercises/Interventions:     Therapeutic Ex (25598)   Min: 20' Sets/sec Reps Notes/CUES   Retro Stepper/BIKE      Alter G      BFR      Sportcord March      3 way SLR      SAQ      Hip ext on leg press 2 10 30#   Hip Ext /table      BOSU fwd lunge 5\"   BOSU bridge  2 10 5\"   Leg Press  2 10 ecc 130#/slight turn out   SLE with ER 2 10    Prone stretch 20\" 2    Glute side walks [place]   RDL (SL) 10#   Box step up    SLS with TKE on airex 3 10 With toss    Step up      Swissball wall rolls- in SLS- hip drive      Quad hip ext/wall-ball rolls                  Manual Intervention (26679)  Min: 25'      Knee mobs/PROM/GISTM 25'  Ayers taping (medial glide)   Tib/Fem Mobs      Patella Mobs      Ankle mobs            DN      NMR re-education (33335)  Min:   CUES NEEDED   [place]/Biofeedback 10/10      BFR      G. Med activation      Hip Ext full ROM/ G. Activation      Bosu Bal and Prop- G Med      Single leg stance/Balance/Prop      Bosu Retro G. Med act      Prone Hip froggers- sliders/elevated            Therapeutic Activity (47544)  Min:      [employer]      Dynamic Balance                        Spoke with   regarding the use of Dry Needling     Dry needling manual therapy: consisted on the placement of 7 needles in the following muscles:  lateral quad, ITB, rectus, inferior patella. A 30-60 mm needle was inserted, piston, rotated, and coned to produce intramuscular mobilization. These techniques were used to restore functional range of motion, reduce muscle spasm and induce healing in the corresponding musculature. (60797)  Clean Technique was utilized today while applying Dry needling treatment. The treatment sites where cleaned with 70% solution of  isopropyl alcohol . The PT washed their hands and utilized treatment gloves along with hand  prior to inserting the needles. All needles where removed and discarded in the appropriate sharps container. MD has given verbal and/or written approval for this treatment. Attended low frequency (1-20Hz) electrical stimulation was utilized in conjunction with Dry Needling:  the Estim was manipulated between all above needles for a period of 10 min. at 4-6 volts. The low frequency electrical stimulation was used to help reduce muscle spasm and help to interrupt /[place] the pain cycle.  (33860)       Therapeutic Exercise and NMR EXR  [x] (55769) Provided verbal/tactile cueing for activities related to strengthening, flexibility, endurance, ROM for improvements in LE, proximal hip, and core control with self care, mobility, lifting, ambulation. [x] (29972) Provided verbal/tactile cueing for activities related to improving balance, coordination, kinesthetic sense, posture, motor skill, proprioception  to assist with LE, proximal hip, and core control in self care, mobility, lifting, ambulation and eccentric single leg control. NMR and Therapeutic Activities:    [x] (00809 or 66858) Provided verbal/tactile cueing for activities related to improving balance, coordination, kinesthetic sense, posture, motor skill, proprioception and motor activation to allow for proper function of core, proximal hip and LE with self care and ADLs and functional mobility.   [] (61818) Gait Re-education- Provided training and instruction to the patient for proper LE, core and proximal hip recruitment and positioning and eccentric body weight control with ambulation re-education including up and down stairs     Home Exercise Program:    [x] (77645) Reviewed/Progressed HEP activities related to strengthening, flexibility, endurance, ROM of core, proximal hip and LE for functional self-care, mobility, lifting and ambulation/stair navigation   [] (78085)Reviewed/Progressed HEP activities related to improving balance, coordination, kinesthetic sense, posture, motor skill, proprioception of core, proximal hip and LE for self care, mobility, lifting, and ambulation/stair navigation      Manual Treatments:  PROM / STM / Oscillations-Mobs:  G-I, II, III, IV (PA's, Inf., Post.)  [x] (55129) Provided manual therapy to mobilize LE, proximal hip and/or LS spine soft tissue/joints for the purpose of modulating pain, promoting relaxation,  increasing ROM, reducing/eliminating soft tissue swelling/inflammation/restriction, improving soft tissue extensibility and allowing for proper ROM for normal function with self care, mobility, lifting and ambulation.      Modalities:     [] GAME READY (VASO)- for significant edema, swelling, pain control. Charges:  Timed Code Treatment Minutes: 45   Total Treatment Minutes: 45      [] EVAL (LOW) 35577 (typically 20 minutes face-to-face)  [] EVAL (MOD) 10506 (typically 30 minutes face-to-face)  [] EVAL (HIGH) 80119 (typically 45 minutes face-to-face)  [] RE-EVAL     [x] EM(47967) x 1    [] DRY NEEDLE 1 OR 2 MUSCLES  [] NMR (41971) x     [] DRY NEEDLE 3+ MUSCLES  [x] Manual (39205) x    2   [] TA (76441) x     [] Mech Traction (28524)  [] ES(attended) (63296)     [] ES (un) (29164):   [] VASO (53329)  [] Other:    If BWC Please Indicate Time In/Out  CPT Code Time in Time out                                   GOALS:  Patient stated goal: pain free basketball  [] Progressing: [] Met: [] Not Met: [] Adjusted    Therapist goals for Patient:   Short Term Goals: To be achieved in: 2 weeks  1. Independent in HEP and progression per patient tolerance, in order to prevent re-injury. [] Progressing: [] Met: [] Not Met: [] Adjusted  2. Patient will have a decrease in pain to facilitate improvement in movement, function, and ADLs as indicated by Functional Deficits. [] Progressing: [] Met: [] Not Met: [] Adjusted    Long Term Goals: To be achieved in: 4-6 weeks  1. Disability index score of 5% or less for the LEFS to assist with reaching prior level of function. [] Progressing: [] Met: [] Not Met: [] Adjusted  2. Patient will demonstrate increased AROM to [facility] to allow for proper joint functioning as indicated by patients Functional Deficits. [] Progressing: [] Met: [] Not Met: [] Adjusted  3. Patient will demonstrate an increase in Strength to good proximal hip strength and control, within 5lb HHD in LE to allow for proper functional mobility as indicated by patients Functional Deficits. [] Progressing: [] Met: [] Not Met: [] Adjusted  4.  Patient will return to running, jumping and change of direction functional activities without increased symptoms or restriction. [] Progressing: [] Met: [] Not Met: [] Adjusted    ASSESSMENT:  Good tolerance to treatment. Appropriately fatigued at conclusion. Decreased pain with manual therapy. Return to Play: (if applicable)   []  Stage 1: Intro to Strength   []  Stage 2: Return to Run and Strength   []  Stage 3: Return to Jump and Strength   []  Stage 4: Dynamic Strength and Agility   []  Stage 5: Sport Specific Training     []  Ready to Return to Play, Meets All Above Stages   []  Not Ready for Return to Sports   Comments:            Treatment/Activity Tolerance:  [x] Patient tolerated treatment well [] Patient limited by fatique  [] Patient limited by pain  [] Patient limited by other medical complications  [] Other:     Overall Progression Towards Functional goals/ Treatment Progress Update:  [x] Patient is progressing as expected towards functional goals listed. [] Progression is slowed due to complexities/Impairments listed. [] Progression has been slowed due to co-morbidities. [] Plan just implemented, too soon to assess goals progression <30days   [] Goals require adjustment due to lack of progress  [] Patient is not progressing as expected and requires additional follow up with physician  [] Other    Prognosis for POC: [x] Good [] Fair  [] Poor    Patient requires continued skilled intervention: [x] Yes  [] No        PLAN:   [x] Continue per plan of care [] Alter current plan (see comments)  [] Plan of care initiated [] Hold pending MD visit [] Discharge    Electronically signed by: Ana Nicole PTA    Note: If patient does not return for scheduled/recommended follow up visits, this note will serve as a discharge from care along with the most recent update on progress.

## 2021-09-22 ENCOUNTER — HOSPITAL ENCOUNTER (OUTPATIENT)
Dept: PHYSICAL THERAPY | Age: 21
Setting detail: THERAPIES SERIES
Discharge: HOME | End: 2021-09-22
Payer: COMMERCIAL

## 2021-09-27 ENCOUNTER — HOSPITAL ENCOUNTER (OUTPATIENT)
Dept: PHYSICAL THERAPY | Age: 21
Setting detail: THERAPIES SERIES
Discharge: HOME | End: 2021-09-27
Payer: COMMERCIAL

## 2021-09-27 PROCEDURE — 97140 MANUAL THERAPY 1/> REGIONS: CPT

## 2021-09-27 PROCEDURE — 97110 THERAPEUTIC EXERCISES: CPT

## 2021-09-27 NOTE — FLOWSHEET NOTE
[place] [address] [facility] [address]  Phone: [phone] Fax: [phone]    Physical Therapy Treatment Note/ Progress Report:     Date:  2021    Patient Name:  Corey Dominguez    :  2000  MRN: [id number]  Restrictions/Precautions:    Medical/Treatment Diagnosis Information:  · Diagnosis: bilateral knee PFD  · Treatment Diagnosis: M25.561, P24.109  Insurance/Certification information:  PT Insurance Information: BCBS/AG/[place] 60 visits  Physician Information:  Referring Practitioner: Jadene Boast, MD  Plan of care signed (Y/N):     Date of Patient follow up with Physician:      Progress Report: []  Yes  []  No     Date Range for reporting period:  Beginnin2021  Ending:      Progress report due (10 Rx/or 30 days whichever is less):      Recertification due (POC duration/ or 90 days whichever is less): 10/28/2021     Visit # Insurance Allowable Auth Needed   6  []Yes   []No     Latex Allergy:  [x]NO      []YES  Preferred Language for Healthcare:   [x]English       []other:  Functional Scale: LEFS 42%  Date assessed:2021    Pain level:  4/10     SUBJECTIVE: Patient reports continued knee soreness due to increased practice hours.      OBJECTIVE:    Observation:    Test measurements:      RESTRICTIONS/PRECAUTIONS:     Exercises/Interventions:     Therapeutic Ex (34332)   Min: 20' Sets/sec Reps Notes/CUES   Retro Stepper/BIKE      Alter G      BFR      Sportcord March      3 way SLR      SAQ      Hip ext on leg press 2 10 30#   Hip Ext /table      BOSU fwd lunge 5\"   BOSU bridge  2 10 5\"   Leg Press  2 10 ecc 130#/slight turn out   SLE with ER    Prone stretch 20\" 2    Glute side walks 2 10 [place]   RDL (SL) 2 10 10#   Box step up    SLS with TKE on airex 3 10 With toss    Step up      Swissball wall rolls- in SLS- hip drive      Quad hip ext/wall-ball rolls                  Manual Intervention (78250)  Min: 15'      Knee mobs/PROM/GISTM 15'  Ayers taping (medial glide)   Tib/Fem Mobs      Patella Mobs      Ankle mobs            DN      NMR re-education (32307)  Min:   CUES NEEDED   [place]/Biofeedback 10/10      BFR      G. Med activation      Hip Ext full ROM/ G. Activation      Bosu Bal and Prop- G Med      Single leg stance/Balance/Prop      Bosu Retro G. Med act      Prone Hip froggers- sliders/elevated            Therapeutic Activity (30129)  Min:      Ladders      P[place]      Dynamic Balance                        Spoke with   regarding the use of Dry Needling     Dry needling manual therapy: consisted on the placement of 7 needles in the following muscles:  lateral quad, ITB, rectus, inferior patella. A 30-60 mm needle was inserted, piston, rotated, and coned to produce intramuscular mobilization. These techniques were used to restore functional range of motion, reduce muscle spasm and induce healing in the corresponding musculature. (57823)  Clean Technique was utilized today while applying Dry needling treatment. The treatment sites where cleaned with 70% solution of  isopropyl alcohol . The PT washed their hands and utilized treatment gloves along with hand  prior to inserting the needles. All needles where removed and discarded in the appropriate sharps container. MD has given verbal and/or written approval for this treatment. Attended low frequency (1-20Hz) electrical stimulation was utilized in conjunction with Dry Needling:  the Estim was manipulated between all above needles for a period of 10 min. at 4-6 volts. The low frequency electrical stimulation was used to help reduce muscle spasm and help to interrupt /Burton the pain cycle.  (46253)       Therapeutic Exercise and NMR EXR  [x] (61047) Provided verbal/tactile cueing for activities related to strengthening, flexibility, endurance, ROM for improvements in LE, proximal hip, and core control with self care, mobility, Treatment Minutes: 35   Total Treatment Minutes: 35      [] EVAL (LOW) 28010 (typically 20 minutes face-to-face)  [] EVAL (MOD) 42001 (typically 30 minutes face-to-face)  [] EVAL (HIGH) 99934 (typically 45 minutes face-to-face)  [] RE-EVAL     [x] XV(05299) x 1    [] DRY NEEDLE 1 OR 2 MUSCLES  [] NMR (64146) x     [] DRY NEEDLE 3+ MUSCLES  [x] Manual (79337) x    1   [] TA (13872) x     [] Mech Traction (62745)  [] ES(attended) (15303)     [] ES (un) (55175):   [] VASO (02339)  [] Other:    If BW Please Indicate Time In/Out  CPT Code Time in Time out                                   GOALS:  Patient stated goal: pain free basketball  [] Progressing: [] Met: [] Not Met: [] Adjusted    Therapist goals for Patient:   Short Term Goals: To be achieved in: 2 weeks  1. Independent in HEP and progression per patient tolerance, in order to prevent re-injury. [] Progressing: [] Met: [] Not Met: [] Adjusted  2. Patient will have a decrease in pain to facilitate improvement in movement, function, and ADLs as indicated by Functional Deficits. [] Progressing: [] Met: [] Not Met: [] Adjusted    Long Term Goals: To be achieved in: 4-6 weeks  1. Disability index score of 5% or less for the LEFS to assist with reaching prior level of function. [] Progressing: [] Met: [] Not Met: [] Adjusted  2. Patient will demonstrate increased AROM to [facility] to allow for proper joint functioning as indicated by patients Functional Deficits. [] Progressing: [] Met: [] Not Met: [] Adjusted  3. Patient will demonstrate an increase in Strength to good proximal hip strength and control, within 5lb HHD in LE to allow for proper functional mobility as indicated by patients Functional Deficits. [] Progressing: [] Met: [] Not Met: [] Adjusted  4. Patient will return to running, jumping and change of direction functional activities without increased symptoms or restriction.    [] Progressing: [] Met: [] Not Met: [] Adjusted    ASSESSMENT:  Treatment short due to time constraint. Felt better after graston. Continued weakness with eccentric control of both knees. Jumping and landing produce pain in both knees. Return to Play: (if applicable)   []  Stage 1: Intro to Strength   []  Stage 2: Return to Run and Strength   []  Stage 3: Return to Jump and Strength   []  Stage 4: Dynamic Strength and Agility   []  Stage 5: Sport Specific Training     []  Ready to Return to Play, Meets All Above Stages   []  Not Ready for Return to Sports   Comments:            Treatment/Activity Tolerance:  [x] Patient tolerated treatment well [] Patient limited by fatique  [] Patient limited by pain  [] Patient limited by other medical complications  [] Other:     Overall Progression Towards Functional goals/ Treatment Progress Update:  [x] Patient is progressing as expected towards functional goals listed. [] Progression is slowed due to complexities/Impairments listed. [] Progression has been slowed due to co-morbidities. [] Plan just implemented, too soon to assess goals progression <30days   [] Goals require adjustment due to lack of progress  [] Patient is not progressing as expected and requires additional follow up with physician  [] Other    Prognosis for POC: [x] Good [] Fair  [] Poor    Patient requires continued skilled intervention: [x] Yes  [] No        PLAN:   [x] Continue per plan of care [] Alter current plan (see comments)  [] Plan of care initiated [] Hold pending MD visit [] Discharge    Electronically signed by: Jose Alejandro Zheng PT    Note: If patient does not return for scheduled/recommended follow up visits, this note will serve as a discharge from care along with the most recent update on progress.

## 2021-09-29 ENCOUNTER — HOSPITAL ENCOUNTER (OUTPATIENT)
Dept: PHYSICAL THERAPY | Age: 21
Setting detail: THERAPIES SERIES
Discharge: HOME | End: 2021-09-29
Payer: COMMERCIAL

## 2021-09-29 PROCEDURE — 97032 APPL MODALITY 1+ESTIM EA 15: CPT

## 2021-09-29 PROCEDURE — 97140 MANUAL THERAPY 1/> REGIONS: CPT

## 2021-09-29 PROCEDURE — 20560 NDL INSJ W/O NJX 1 OR 2 MUSC: CPT

## 2021-09-29 PROCEDURE — 97110 THERAPEUTIC EXERCISES: CPT

## 2021-09-29 NOTE — FLOWSHEET NOTE
[place] [address] [address]  Phone: [phone] Fax: [phone]    Physical Therapy Treatment Note/ Progress Report:     Date:  2021    Patient Name:  Chinyere Weinberg    :  2000  MRN: [id number]  Restrictions/Precautions:    Medical/Treatment Diagnosis Information:  · Diagnosis: bilateral knee PFD  · Treatment Diagnosis: M25.561, O25.218  Insurance/Certification information:  PT Insurance Information: BCBS/AG/[place] 60 visits  Physician Information:  Referring Practitioner: Paty Rosenthal MD  Plan of care signed (Y/N):     Date of Patient follow up with Physician:      Progress Report: []  Yes  []  No     Date Range for reporting period:  Beginnin2021  Ending:      Progress report due (10 Rx/or 30 days whichever is less): 1401     Recertification due (POC duration/ or 90 days whichever is less): 10/28/2021     Visit # Insurance Allowable Auth Needed   7  []Yes   []No     Latex Allergy:  [x]NO      []YES  Preferred Language for Healthcare:   [x]English       []other:  Functional Scale: LEFS 42%  Date assessed:2021    Pain level:  4/10     SUBJECTIVE: Patient states both knees are not as sore today due to having the day off of practice.      OBJECTIVE:    Observation: tender through inferior pole of patella and quad tendon bilaterally with significant tightness through lateral quad     Test measurements:      RESTRICTIONS/PRECAUTIONS:     Exercises/Interventions:     Therapeutic Ex (07710)   Min: 20' Sets/sec Reps Notes/CUES   Retro Stepper/BIKE      Alter G      BFR      Sportcord March      3 way SLR      SAQ      Hip ext on leg press 2 10 30#   Hip Ext /table      BOSU fwd lunge 1 10 5\"   BOSU bridge  2 10 5\"   Leg Press  2 10 ecc 130#/slight turn out   SLE with ER    Prone stretch 20\" 2    Glute side walks 2 10 [place]   RDL (SL) 2 10 10#   Box step up    SLS with TKE on airex 3 10 With toss Step up      Swissball wall rolls- in SLS- hip drive      Quad hip ext/wall-ball rolls                  Manual Intervention (20018)  Min: 30'      Knee mobs/PROM/GISTM 15'  Ayers taping (medial glide)   Tib/Fem Mobs      Patella Mobs      Ankle mobs            DN 15'     NMR re-education (56233)  Min:   CUES NEEDED   [place]/Biofeedback 10/10      BFR      G. Med activation      Hip Ext full ROM/ G. Activation      Bosu Bal and Prop- G Med      Single leg stance/Balance/Prop      Bosu Retro G. Med act      Prone Hip froggers- sliders/elevated            Therapeutic Activity (85771)  Min:      Party[place]      Dynamic Balance                        Spoke with   regarding the use of Dry Needling     Dry needling manual therapy: consisted on the placement of 7 needles in the following muscles:  lateral quad, ITB, rectus, inferior patella. A 30-60 mm needle was inserted, piston, rotated, and coned to produce intramuscular mobilization. These techniques were used to restore functional range of motion, reduce muscle spasm and induce healing in the corresponding musculature. (53952)  Clean Technique was utilized today while applying Dry needling treatment. The treatment sites where cleaned with 70% solution of  isopropyl alcohol . The PT washed their hands and utilized treatment gloves along with hand  prior to inserting the needles. All needles where removed and discarded in the appropriate sharps container. MD has given verbal and/or written approval for this treatment. Attended low frequency (1-20Hz) electrical stimulation was utilized in conjunction with Dry Needling:  the Estim was manipulated between all above needles for a period of 10 min. at 4-6 volts. The low frequency electrical stimulation was used to help reduce muscle spasm and help to interrupt /[place] the pain cycle.  (48542)       Therapeutic Exercise and NMR EXR  [x] (09385) Provided verbal/tactile cueing for activities related to strengthening, flexibility, endurance, ROM for improvements in LE, proximal hip, and core control with self care, mobility, lifting, ambulation. [x] (63439) Provided verbal/tactile cueing for activities related to improving balance, coordination, kinesthetic sense, posture, motor skill, proprioception  to assist with LE, proximal hip, and core control in self care, mobility, lifting, ambulation and eccentric single leg control.      NMR and Therapeutic Activities:    [x] (58771 or 00489) Provided verbal/tactile cueing for activities related to improving balance, coordination, kinesthetic sense, posture, motor skill, proprioception and motor activation to allow for proper function of core, proximal hip and LE with self care and ADLs and functional mobility.   [] (17983) Gait Re-education- Provided training and instruction to the patient for proper LE, core and proximal hip recruitment and positioning and eccentric body weight control with ambulation re-education including up and down stairs     Home Exercise Program:    [x] (81942) Reviewed/Progressed HEP activities related to strengthening, flexibility, endurance, ROM of core, proximal hip and LE for functional self-care, mobility, lifting and ambulation/stair navigation   [] (02726)Reviewed/Progressed HEP activities related to improving balance, coordination, kinesthetic sense, posture, motor skill, proprioception of core, proximal hip and LE for self care, mobility, lifting, and ambulation/stair navigation      Manual Treatments:  PROM / STM / Oscillations-Mobs:  G-I, II, III, IV (PA's, Inf., Post.)  [x] (56023) Provided manual therapy to mobilize LE, proximal hip and/or LS spine soft tissue/joints for the purpose of modulating pain, promoting relaxation,  increasing ROM, reducing/eliminating soft tissue swelling/inflammation/restriction, improving soft tissue extensibility and allowing for proper ROM for normal function with self care, direction functional activities without increased symptoms or restriction. [] Progressing: [] Met: [] Not Met: [] Adjusted    ASSESSMENT:  Patient tolerated DN well and is making good gains with eccentric loading both knees. Chronic tendonitis issues in both knees is creating some increased endurance issues however, she is getting stronger through glutes. Return to Play: (if applicable)   []  Stage 1: Intro to Strength   []  Stage 2: Return to Run and Strength   []  Stage 3: Return to Jump and Strength   []  Stage 4: Dynamic Strength and Agility   []  Stage 5: Sport Specific Training     []  Ready to Return to Play, Meets All Above Stages   []  Not Ready for Return to Sports   Comments:            Treatment/Activity Tolerance:  [x] Patient tolerated treatment well [] Patient limited by fatique  [] Patient limited by pain  [] Patient limited by other medical complications  [] Other:     Overall Progression Towards Functional goals/ Treatment Progress Update:  [x] Patient is progressing as expected towards functional goals listed. [] Progression is slowed due to complexities/Impairments listed. [] Progression has been slowed due to co-morbidities. [] Plan just implemented, too soon to assess goals progression <30days   [] Goals require adjustment due to lack of progress  [] Patient is not progressing as expected and requires additional follow up with physician  [] Other    Prognosis for POC: [x] Good [] Fair  [] Poor    Patient requires continued skilled intervention: [x] Yes  [] No        PLAN:   [x] Continue per plan of care [] Alter current plan (see comments)  [] Plan of care initiated [] Hold pending MD visit [] Discharge    Electronically signed by: Matias Tapia PT    Note: If patient does not return for scheduled/recommended follow up visits, this note will serve as a discharge from care along with the most recent update on progress.

## 2021-10-04 ENCOUNTER — HOSPITAL ENCOUNTER (OUTPATIENT)
Dept: PHYSICAL THERAPY | Age: 21
Setting detail: THERAPIES SERIES
Discharge: HOME | End: 2021-10-04
Payer: COMMERCIAL

## 2021-10-04 PROCEDURE — 97140 MANUAL THERAPY 1/> REGIONS: CPT

## 2021-10-04 PROCEDURE — 97110 THERAPEUTIC EXERCISES: CPT

## 2021-10-04 NOTE — FLOWSHEET NOTE
[place] [address] [place] [address]  Phone: [phone] Fax: [phone]    Physical Therapy Treatment Note/ Progress Report:     Date:  10/4/2021    Patient Name:  Antonette Patel    :  2000  MRN: [id number]  Restrictions/Precautions:    Medical/Treatment Diagnosis Information:  · Diagnosis: bilateral knee PFD  · Treatment Diagnosis: M25.561, W10.564  Insurance/Certification information:  PT Insurance Information: BCBS/AG/[place] 60 visits  Physician Information:  Referring Practitioner: Andressa Edwards MD  Plan of care signed (Y/N):     Date of Patient follow up with Physician:      Progress Report: []  Yes  []  No     Date Range for reporting period:  Beginnin2021  Ending:      Progress report due (10 Rx/or 30 days whichever is less):      Recertification due (POC duration/ or 90 days whichever is less): 10/28/2021     Visit # Insurance Allowable Auth Needed   8  []Yes   []No     Latex Allergy:  [x]NO      []YES  Preferred Language for Healthcare:   [x]English       []other:  Functional Scale: LEFS 42%  Date assessed:2021    Pain level:  4/10     SUBJECTIVE: Patient reports that her knees are sore from practice and lift this am. States that she is allowed to leg press instead of squat to help limit knee pain. Felt good after needling.     OBJECTIVE:    Observation: tender through inferior pole of patella and quad tendon bilaterally with significant tightness through lateral quad     Test measurements:      RESTRICTIONS/PRECAUTIONS:     Exercises/Interventions:     Therapeutic Ex (76397)   Min: 30' Sets/sec Reps Notes/CUES   Retro Stepper/BIKE      Alter G      BFR      Sportcord March      3 way SLR      SAQ      Hip ext on leg press 2 10 30#   Hip Ext /table      BOSU fwd lunge 1 10 5\"   BOSU bridge  2 10 5\"   Leg Press  2 10 ecc 130#/slight turn out   SLE with ER    Prone stretch 20\" 2    Glute side walks 2 10 White [place]   RDL (SL) 2 10 10#   Box step up    SLS with TKE on airex 3 10 With toss    Step up      Swissball wall rolls- in SLS- hip drive      Quad hip ext/wall-ball rolls                  Manual Intervention (40425)  Min: 15'      Knee mobs/PROM/GISTM 15'  Ayers taping (medial glide)   Tib/Fem Mobs      Patella Mobs      Ankle mobs            DN 15'     NMR re-education (89813)  Min:   CUES NEEDED   Guyanese/Biofeedback 10/10      BFR      G. Med activation      Hip Ext full ROM/ G. Activation      Bosu Bal and Prop- G Med      Single leg stance/Balance/Prop      Bosu Retro G. Med act      Prone Hip froggers- sliders/elevated            Therapeutic Activity (13951)  Min:      [employer]      Dynamic Balance                        Spoke with   regarding the use of Dry Needling     Dry needling manual therapy: consisted on the placement of 7 needles in the following muscles:  lateral quad, ITB, rectus, inferior patella. A 30-60 mm needle was inserted, piston, rotated, and coned to produce intramuscular mobilization. These techniques were used to restore functional range of motion, reduce muscle spasm and induce healing in the corresponding musculature. (40939)  Clean Technique was utilized today while applying Dry needling treatment. The treatment sites where cleaned with 70% solution of  isopropyl alcohol . The PT washed their hands and utilized treatment gloves along with hand  prior to inserting the needles. All needles where removed and discarded in the appropriate sharps container. MD has given verbal and/or written approval for this treatment. Attended low frequency (1-20Hz) electrical stimulation was utilized in conjunction with Dry Needling:  the Estim was manipulated between all above needles for a period of 10 min. at 4-6 volts. The low frequency electrical stimulation was used to help reduce muscle spasm and help to interrupt /[place] the pain cycle.  (69515) Therapeutic Exercise and NMR EXR  [x] (07687) Provided verbal/tactile cueing for activities related to strengthening, flexibility, endurance, ROM for improvements in LE, proximal hip, and core control with self care, mobility, lifting, ambulation. [x] (80682) Provided verbal/tactile cueing for activities related to improving balance, coordination, kinesthetic sense, posture, motor skill, proprioception  to assist with LE, proximal hip, and core control in self care, mobility, lifting, ambulation and eccentric single leg control.      NMR and Therapeutic Activities:    [x] (43841 or 23330) Provided verbal/tactile cueing for activities related to improving balance, coordination, kinesthetic sense, posture, motor skill, proprioception and motor activation to allow for proper function of core, proximal hip and LE with self care and ADLs and functional mobility.   [] (96594) Gait Re-education- Provided training and instruction to the patient for proper LE, core and proximal hip recruitment and positioning and eccentric body weight control with ambulation re-education including up and down stairs     Home Exercise Program:    [x] (75807) Reviewed/Progressed HEP activities related to strengthening, flexibility, endurance, ROM of core, proximal hip and LE for functional self-care, mobility, lifting and ambulation/stair navigation   [] (67759)Reviewed/Progressed HEP activities related to improving balance, coordination, kinesthetic sense, posture, motor skill, proprioception of core, proximal hip and LE for self care, mobility, lifting, and ambulation/stair navigation      Manual Treatments:  PROM / STM / Oscillations-Mobs:  G-I, II, III, IV (PA's, Inf., Post.)  [x] (48813) Provided manual therapy to mobilize LE, proximal hip and/or LS spine soft tissue/joints for the purpose of modulating pain, promoting relaxation,  increasing ROM, reducing/eliminating soft tissue swelling/inflammation/restriction, improving soft tissue extensibility and allowing for proper ROM for normal function with self care, mobility, lifting and ambulation. Modalities:     [] GAME READY (VASO)- for significant edema, swelling, pain control. Charges:  Timed Code Treatment Minutes: 45   Total Treatment Minutes: 45      [] EVAL (LOW) 96297 (typically 20 minutes face-to-face)  [] EVAL (MOD) 75529 (typically 30 minutes face-to-face)  [] EVAL (HIGH) 69660 (typically 45 minutes face-to-face)  [] RE-EVAL     [x] ZX(16322) x 2    [] DRY NEEDLE 1 OR 2 MUSCLES  [] NMR (47647) x     [] DRY NEEDLE 3+ MUSCLES  [x] Manual (93907) x    1   [] TA (94961) x     [] Mech Traction (24803)  [] ES(attended) (34836)     [] ES (un) (09879):   [] VASO (04906)  [] Other:    If BWC Please Indicate Time In/Out  CPT Code Time in Time out                                   GOALS:  Patient stated goal: pain free basketball  [] Progressing: [] Met: [] Not Met: [] Adjusted    Therapist goals for Patient:   Short Term Goals: To be achieved in: 2 weeks  1. Independent in HEP and progression per patient tolerance, in order to prevent re-injury. [] Progressing: [] Met: [] Not Met: [] Adjusted  2. Patient will have a decrease in pain to facilitate improvement in movement, function, and ADLs as indicated by Functional Deficits. [] Progressing: [] Met: [] Not Met: [] Adjusted    Long Term Goals: To be achieved in: 4-6 weeks  1. Disability index score of 5% or less for the LEFS to assist with reaching prior level of function. [] Progressing: [] Met: [] Not Met: [] Adjusted  2. Patient will demonstrate increased [facility] to allow for proper joint functioning as indicated by patients Functional Deficits. [] Progressing: [] Met: [] Not Met: [] Adjusted  3. Patient will demonstrate an increase in Strength to good proximal hip strength and control, within 5lb HHD in LE to allow for proper functional mobility as indicated by patients Functional Deficits.    [] Progressing: [] Met: [] Not Met: [] Adjusted  4. Patient will return to running, jumping and change of direction functional activities without increased symptoms or restriction. [] Progressing: [] Met: [] Not Met: [] Adjusted    ASSESSMENT:  Good tolerance to treatment. Focused on glute recruitment and strength as well as proprioception. Appropriately fatigued at conclusion. Return to Play: (if applicable)   []  Stage 1: Intro to Strength   []  Stage 2: Return to Run and Strength   []  Stage 3: Return to Jump and Strength   []  Stage 4: Dynamic Strength and Agility   []  Stage 5: Sport Specific Training     []  Ready to Return to Play, Meets All Above Stages   []  Not Ready for Return to Sports   Comments:            Treatment/Activity Tolerance:  [x] Patient tolerated treatment well [] Patient limited by fatique  [] Patient limited by pain  [] Patient limited by other medical complications  [] Other:     Overall Progression Towards Functional goals/ Treatment Progress Update:  [x] Patient is progressing as expected towards functional goals listed. [] Progression is slowed due to complexities/Impairments listed. [] Progression has been slowed due to co-morbidities. [] Plan just implemented, too soon to assess goals progression <30days   [] Goals require adjustment due to lack of progress  [] Patient is not progressing as expected and requires additional follow up with physician  [] Other    Prognosis for POC: [x] Good [] Fair  [] Poor    Patient requires continued skilled intervention: [x] Yes  [] No        PLAN:   [x] Continue per plan of care [] Alter current plan (see comments)  [] Plan of care initiated [] Hold pending MD visit [] Discharge    Electronically signed by: Nia Rodríguez PTA    Note: If patient does not return for scheduled/recommended follow up visits, this note will serve as a discharge from care along with the most recent update on progress.

## 2021-10-06 ENCOUNTER — APPOINTMENT (OUTPATIENT)
Dept: PHYSICAL THERAPY | Age: 21
End: 2021-10-06
Payer: COMMERCIAL

## 2021-10-11 ENCOUNTER — HOSPITAL ENCOUNTER (OUTPATIENT)
Dept: PHYSICAL THERAPY | Age: 21
Setting detail: THERAPIES SERIES
Discharge: HOME | End: 2021-10-11
Payer: COMMERCIAL

## 2021-10-11 PROCEDURE — 97140 MANUAL THERAPY 1/> REGIONS: CPT

## 2021-10-11 PROCEDURE — 97110 THERAPEUTIC EXERCISES: CPT

## 2021-10-11 NOTE — FLOWSHEET NOTE
[facility] [address] [facility]  Phone: [phone] Fax: [phone]    Physical Therapy Treatment Note/ Progress Report:     Date:  10/11/2021    Patient Name:  Janelle Leone    :  2000  MRN: [id number]  Restrictions/Precautions:    Medical/Treatment Diagnosis Information:  · Diagnosis: bilateral knee PFD  · Treatment Diagnosis: M25.561, N26.818  Insurance/Certification information:  PT Insurance Information: BCBS/AG/[place] 60 visits  Physician Information:  Referring Practitioner: Zachary Meyer MD  Plan of care signed (Y/N):     Date of Patient follow up with Physician:      Progress Report: []  Yes  []  No     Date Range for reporting period:  Beginnin2021  Ending:      Progress report due (10 Rx/or 30 days whichever is less): 9264     Recertification due (POC duration/ or 90 days whichever is less): 10/28/2021     Visit # Insurance Allowable Auth Needed   9  []Yes   []No     Latex Allergy:  [x]NO      []YES  Preferred Language for Healthcare:   [x]English       []other:  Functional Scale: LEFS 42%  Date assessed:2021    Pain level:  4/10     SUBJECTIVE: Patient reports that her right patella tendon is causing the most pain today. Patient reports that she is still sore although she had the weekend off.      OBJECTIVE:    Observation: tender through inferior pole of patella and quad tendon bilaterally with significant tightness through lateral quad     Test measurements:      RESTRICTIONS/PRECAUTIONS:     Exercises/Interventions:     Therapeutic Ex ()   Min: 30' Sets/sec Reps Notes/CUES   Retro Stepper/BIKE      Alter G      BFR      Sportcord March      3 way SLR      SAQ      Hip ext on leg press 2 10 30#   Hip Ext /table      BOSU fwd lunge 1 10 5\"   BOSU bridge  2 10 5\"   Leg Press  2 10 ecc 130#/slight turn out   SLE with ER    Prone stretch 20\" 2    Glute side walks 2 10 White [place]   RDL (SL) 2 10 10#   Box step up    SLS with TKE on airex 3 10 With toss    Step up      Swissball wall rolls- in SLS- hip drive      Quad hip ext/wall-ball rolls                  Manual Intervention (06775)  Min: 15'      Knee mobs/PROM/GISTM 15'  Ayers taping (medial glide)   Tib/Fem Mobs      Patella Mobs      Ankle mobs            DN 15'     NMR re-education (50846)  Min:   CUES NEEDED   Ukrainian/Biofeedback 10/10      BFR      G. Med activation      Hip Ext full ROM/ G. Activation      Bosu Bal and Prop- G Med      Single leg stance/Balance/Prop      Bosu Retro G. Med act      Prone Hip froggers- sliders/elevated            Therapeutic Activity (75234)  Min:      [place]      Dynamic Balance                        Spoke with   regarding the use of Dry Needling     Dry needling manual therapy: consisted on the placement of 7 needles in the following muscles:  lateral quad, ITB, rectus, inferior patella. A 30-60 mm needle was inserted, piston, rotated, and coned to produce intramuscular mobilization. These techniques were used to restore functional range of motion, reduce muscle spasm and induce healing in the corresponding musculature. (70469)  Clean Technique was utilized today while applying Dry needling treatment. The treatment sites where cleaned with 70% solution of  isopropyl alcohol . The PT washed their hands and utilized treatment gloves along with hand  prior to inserting the needles. All needles where removed and discarded in the appropriate sharps container. MD has given verbal and/or written approval for this treatment. Attended low frequency (1-20Hz) electrical stimulation was utilized in conjunction with Dry Needling:  the Estim was manipulated between all above needles for a period of 10 min. at 4-6 volts. The low frequency electrical stimulation was used to help reduce muscle spasm and help to interrupt /[place] the pain cycle.  (99557)       Therapeutic Exercise and NMR EXR  [x] (63912) Provided verbal/tactile cueing for activities related to strengthening, flexibility, endurance, ROM for improvements in LE, proximal hip, and core control with self care, mobility, lifting, ambulation. [x] (74131) Provided verbal/tactile cueing for activities related to improving balance, coordination, kinesthetic sense, posture, motor skill, proprioception  to assist with LE, proximal hip, and core control in self care, mobility, lifting, ambulation and eccentric single leg control.      NMR and Therapeutic Activities:    [x] (66400 or 19682) Provided verbal/tactile cueing for activities related to improving balance, coordination, kinesthetic sense, posture, motor skill, proprioception and motor activation to allow for proper function of core, proximal hip and LE with self care and ADLs and functional mobility.   [] (19484) Gait Re-education- Provided training and instruction to the patient for proper LE, core and proximal hip recruitment and positioning and eccentric body weight control with ambulation re-education including up and down stairs     Home Exercise Program:    [x] (25636) Reviewed/Progressed HEP activities related to strengthening, flexibility, endurance, ROM of core, proximal hip and LE for functional self-care, mobility, lifting and ambulation/stair navigation   [] (16356)Reviewed/Progressed HEP activities related to improving balance, coordination, kinesthetic sense, posture, motor skill, proprioception of core, proximal hip and LE for self care, mobility, lifting, and ambulation/stair navigation      Manual Treatments:  PROM / STM / Oscillations-Mobs:  G-I, II, III, IV (PA's, Inf., Post.)  [x] (68409) Provided manual therapy to mobilize LE, proximal hip and/or LS spine soft tissue/joints for the purpose of modulating pain, promoting relaxation,  increasing ROM, reducing/eliminating soft tissue swelling/inflammation/restriction, improving soft tissue extensibility and allowing for proper ROM for normal function with self care, mobility, lifting and ambulation. Modalities:     [] GAME READY (VASO)- for significant edema, swelling, pain control. Charges:  Timed Code Treatment Minutes: 45   Total Treatment Minutes: 45      [] EVAL (LOW) 65235 (typically 20 minutes face-to-face)  [] EVAL (MOD) 84621 (typically 30 minutes face-to-face)  [] EVAL (HIGH) 37402 (typically 45 minutes face-to-face)  [] RE-EVAL     [x] IV(30854) x 2    [] DRY NEEDLE 1 OR 2 MUSCLES  [] NMR (09929) x     [] DRY NEEDLE 3+ MUSCLES  [x] Manual (94867) x    1   [] TA (76392) x     [] Mech Traction (55712)  [] ES(attended) (76252)     [] ES (un) (90971):   [] VASO (70471)  [] Other:    If BWC Please Indicate Time In/Out  CPT Code Time in Time out                                   GOALS:  Patient stated goal: pain free basketball  [] Progressing: [] Met: [] Not Met: [] Adjusted    Therapist goals for Patient:   Short Term Goals: To be achieved in: 2 weeks  1. Independent in HEP and progression per patient tolerance, in order to prevent re-injury. [] Progressing: [] Met: [] Not Met: [] Adjusted  2. Patient will have a decrease in pain to facilitate improvement in movement, function, and ADLs as indicated by Functional Deficits. [] Progressing: [] Met: [] Not Met: [] Adjusted    Long Term Goals: To be achieved in: 4-6 weeks  1. Disability index score of 5% or less for the LEFS to assist with reaching prior level of function. [] Progressing: [] Met: [] Not Met: [] Adjusted  2. Patient will demonstrate increased AROM to [facility] to allow for proper joint functioning as indicated by patients Functional Deficits. [] Progressing: [] Met: [] Not Met: [] Adjusted  3. Patient will demonstrate an increase in Strength to good proximal hip strength and control, within 5lb HHD in LE to allow for proper functional mobility as indicated by patients Functional Deficits. [] Progressing: [] Met: [] Not Met: [] Adjusted  4.  Patient will

## 2021-10-13 ENCOUNTER — HOSPITAL ENCOUNTER (OUTPATIENT)
Dept: PHYSICAL THERAPY | Age: 21
Setting detail: THERAPIES SERIES
Discharge: HOME | End: 2021-10-13
Payer: COMMERCIAL

## 2021-10-13 NOTE — FLOWSHEET NOTE
[facility] [place] [facility]    Physical Therapy  Cancellation/No-show Note  Patient Name:  Shannon Kathleen  :  2000   Date:  10/13/2021  Cancelled visits to date: 1  No-shows to date: 0    For today's appointment patient:  [x]  Cancelled  []  Rescheduled appointment  []  No-show     Reason given by patient:  []  Patient ill  [x]  Conflicting appointment  []  No transportation    []  Conflict with work  []  No reason given  []  Other:     Comments:      Electronically signed by:  Arlet Morales PTA

## 2021-10-18 ENCOUNTER — HOSPITAL ENCOUNTER (OUTPATIENT)
Dept: PHYSICAL THERAPY | Age: 21
Setting detail: THERAPIES SERIES
Discharge: HOME | End: 2021-10-18
Payer: COMMERCIAL

## 2021-10-18 NOTE — FLOWSHEET NOTE
[facility] [place] [facility]    Physical Therapy  Cancellation/No-show Note  Patient Name:  Alen Rubio  :  2000   Date:  10/18/2021  Cancelled visits to date: 0  No-shows to date: 2    For today's appointment patient:  []  Cancelled  []  Rescheduled appointment  [x]  No-show     Reason given by patient:  []  Patient ill  []  Conflicting appointment  []  No transportation    []  Conflict with work  []  No reason given  []  Other:     Comments:      Electronically signed by:  Kathrin Patterson PTA

## 2021-10-20 ENCOUNTER — HOSPITAL ENCOUNTER (OUTPATIENT)
Dept: PHYSICAL THERAPY | Age: 21
Setting detail: THERAPIES SERIES
Discharge: HOME | End: 2021-10-20
Payer: COMMERCIAL

## 2021-10-20 NOTE — FLOWSHEET NOTE
[facility] [place] [facility]    Physical Therapy  Cancellation/No-show Note  Patient Name:  Onelia Kamara  :  2000   Date:  10/20/2021  Cancelled visits to date: 0  No-shows to date: 3    For today's appointment patient:  []  Cancelled  []  Rescheduled appointment  [x]  No-show     Reason given by patient:  []  Patient ill  []  Conflicting appointment  []  No transportation    []  Conflict with work  []  No reason given  []  Other:     Comments:      Electronically signed by:  Joey León PTA

## 2022-03-17 ENCOUNTER — HOSPITAL ENCOUNTER (OUTPATIENT)
Dept: PHYSICAL THERAPY | Age: 22
Setting detail: THERAPIES SERIES
Discharge: HOME | End: 2022-03-17
Payer: COMMERCIAL

## 2022-03-17 PROCEDURE — 97110 THERAPEUTIC EXERCISES: CPT

## 2022-03-17 PROCEDURE — 97164 PT RE-EVAL EST PLAN CARE: CPT

## 2022-03-17 PROCEDURE — 97140 MANUAL THERAPY 1/> REGIONS: CPT

## 2022-03-17 NOTE — FLOWSHEET NOTE
[place] [address] [address]  Phone: [phone] Fax: [phone]    Physical Therapy Treatment Note/ Progress Report:     Date:  3/17/2022    Patient Name:  Daniel Barbour    :  2000  MRN: [id number]  Restrictions/Precautions:    Medical/Treatment Diagnosis Information:  · Diagnosis: right patellar tendonitis, left quad tendon partial tear  · Treatment Diagnosis: M25.561, F64.707  Insurance/Certification information:  PT Insurance Information: BCBS/AG/[place]  Physician Information:  Referring Practitioner: Kayla Preciado MD  Plan of care signed (Y/N):     Date of Patient follow up with Physician:      Progress Report: [x]  Yes  []  No     Date Range for reporting period:  Beginnin2022  Ending:     Progress report due (10 Rx/or 30 days whichever is less):      Recertification due (POC duration/ or 90 days whichever is less): [id number]     Visit # Insurance Allowable Auth Needed   1 60/MU []Yes   [x]No     Latex Allergy:  [x]NO      []YES  Preferred Language for Healthcare:   [x]English       []other:  Functional Scale: LEFS, 50% Date assessed: 2022    Pain level:  Rest- 0/10     SUBJECTIVE:  See eval    OBJECTIVE: See eval   Observation:    Test measurements:      RESTRICTIONS/PRECAUTIONS: none    Exercises/Interventions:     Therapeutic Ex (08983)   Min: 8' Sets/sec Reps Notes/CUES   Retro Stepper/BIKE      Alter G      BFR      Sportcord March      3 way SLR      SAQ      Clam ABD      Hip Ext /table      BOSU fwd/side lunge      BOSU squat      Leg Press Ecc Full Range 1 15 bilaterally Out 2, back 1   Cybex HS curl      TKE      Glute side walks      RDL      Slide Lunge      Slide HS eccentrics      Step ups/ecc step down      Swissball wall rolls- in SLS- hip drive      Quad hip ext/wall-ball rolls      Hip Matrix 1 10 ea, bilateral Hip Abduction, Hip CCW. CW circles         Manual Intervention (66925)  Min: 12'      Knee mobs/PROM      Tib/Fem Mobs      Patella Mobs      Ankle mobs      STM 1 5 Right hip flexor group, TFL   IASTM 1 5 Right Patellar tendon, left quad tendon     Right Hip 1 5 Grade 3-4 PA mobilizations   NMR re-education (72290)  Min:   CUES NEEDED   Ecuadorean/Biofeedback 10/10      BFR      G. Med activation      Hip Ext full ROM/ G. Activation      Bosu Bal and Prop- G Med      Single leg stance/Balance/Prop      Bosu Retro G. Med act      Prone Hip froggers- sliders/elevated      Lateral hip Slider 1 15ea, bilateral  4# chest press. Cues to sit posteriorly to avoid excessive knee flexion. Therapeutic Activity (55908)  Min:      SepSensor      Dynamic Balance                            Therapeutic Exercise and NMR EXR  [x] (24123) Provided verbal/tactile cueing for activities related to strengthening, flexibility, endurance, ROM for improvements in LE, proximal hip, and core control with self care, mobility, lifting, ambulation. [x] (62515) Provided verbal/tactile cueing for activities related to improving balance, coordination, kinesthetic sense, posture, motor skill, proprioception  to assist with LE, proximal hip, and core control in self care, mobility, lifting, ambulation and eccentric single leg control.      NMR and Therapeutic Activities:    [x] (34497 or 24814) Provided verbal/tactile cueing for activities related to improving balance, coordination, kinesthetic sense, posture, motor skill, proprioception and motor activation to allow for proper function of core, proximal hip and LE with self care and ADLs and functional mobility.   [] (48867) Gait Re-education- Provided training and instruction to the patient for proper LE, core and proximal hip recruitment and positioning and eccentric body weight control with ambulation re-education including up and down stairs     Home Exercise Program:    [x] (12961) Reviewed/Progressed HEP activities related to strengthening, flexibility, endurance, ROM of core, proximal hip and LE for functional self-care, mobility, lifting and ambulation/stair navigation   [] (59914)Reviewed/Progressed HEP activities related to improving balance, coordination, kinesthetic sense, posture, motor skill, proprioception of core, proximal hip and LE for self care, mobility, lifting, and ambulation/stair navigation      Manual Treatments:  PROM / STM / Oscillations-Mobs:  G-I, II, III, IV (PA's, Inf., Post.)  [x] (49516) Provided manual therapy to mobilize LE, proximal hip and/or LS spine soft tissue/joints for the purpose of modulating pain, promoting relaxation,  increasing ROM, reducing/eliminating soft tissue swelling/inflammation/restriction, improving soft tissue extensibility and allowing for proper ROM for normal function with self care, mobility, lifting and ambulation. Modalities:     [] GAME READY (VASO)- for significant edema, swelling, pain control. Charges:  Timed Code Treatment Minutes: 22   Total Treatment Minutes: 45      [] EVAL (LOW) 30289 (typically 20 minutes face-to-face)  [] EVAL (MOD) 49055 (typically 30 minutes face-to-face)  [] EVAL (HIGH) 65183 (typically 45 minutes face-to-face)  [x] RE-EVAL     [x] NG(86419) x   1  [] DRY NEEDLE 1 OR 2 MUSCLES  [] NMR (20745) x     [] DRY NEEDLE 3+ MUSCLES  [x] Manual (98909) x  1     [] TA (07472) x     [] Mech Traction (90873)  [] ES(attended) (53519)     [] ES (un) (91013):   [] VASO (09121)  [] Other:    If [facility] Please Indicate Time In/Out  CPT Code Time in Time out                                   GOALS:  Patient stated goal: Return to pain free sport activities. [] Progressing: [] Met: [] Not Met: [] Adjusted    Therapist goals for Patient:   Short Term Goals: To be achieved in: 2 weeks  1. Independent in HEP and progression per patient tolerance, in order to prevent re-injury. [] Progressing: [] Met: [] Not Met: [] Adjusted  2.  Patient will have a decrease in pain to facilitate improvement in movement, function, and ADLs as indicated by Functional Deficits. [] Progressing: [] Met: [] Not Met: [] Adjusted    Long Term Goals: To be achieved in: 8 weeks  1. Disability index score of 10% or less for the LEFS to assist with reaching prior level of function. [] Progressing: [] Met: [] Not Met: [] Adjusted  2. Patient will demonstrate increased AROM for hip extension during functional length assessment Alex Parcel Test) allow for proper joint functioning as indicated by patients Functional Deficits. [] Progressing: [] Met: [] Not Met: [] Adjusted  3. Patient will demonstrate an increase in Strength to good proximal hip strength and control, within 5lb HHD in LE to allow for proper functional mobility as indicated by patients Functional Deficits. [] Progressing: [] Met: [] Not Met: [] Adjusted  4. Patient will return to Jumping/Landing/Pivoting functional activities without increased symptoms or restriction. [] Progressing: [] Met: [] Not Met: [] Adjusted       ASSESSMENT:  See eval    Return to Play: (if applicable)   []  Stage 1: Intro to Strength   []  Stage 2: Return to Run and Strength   []  Stage 3: Return to Jump and Strength   []  Stage 4: Dynamic Strength and Agility   []  Stage 5: Sport Specific Training     []  Ready to Return to Play, Meets All Above Stages   []  Not Ready for Return to Sports   Comments:            Treatment/Activity Tolerance:  [x] Patient tolerated treatment well [] Patient limited by fatique  [] Patient limited by pain  [] Patient limited by other medical complications  [] Other:     Overall Progression Towards Functional goals/ Treatment Progress Update:  [] Patient is progressing as expected towards functional goals listed. [] Progression is slowed due to complexities/Impairments listed. [] Progression has been slowed due to co-morbidities.   [x] Plan just implemented, too soon to assess goals progression <30days   [] Goals require adjustment due to lack of progress  [] Patient is not progressing as expected and requires additional follow up with physician  [] Other    Prognosis for POC: [x] Good [] Fair  [] Poor    Patient requires continued skilled intervention: [x] Yes  [] No        PLAN: See eval  [] Continue per plan of care [] Alter current plan (see comments)  [x] Plan of care initiated [] Hold pending MD visit [] Discharge    Electronically signed by:  Margy Vanessa [facility], [address], PT    Therapist was present, directed the patient's care, made skilled judgement, and was responsible for assessment and treatment of the patient    Note: If patient does not return for scheduled/recommended follow up visits, this note will serve as a discharge from care along with the most recent update on progress.

## 2022-04-01 ENCOUNTER — HOSPITAL ENCOUNTER (OUTPATIENT)
Dept: PHYSICAL THERAPY | Age: 22
Setting detail: THERAPIES SERIES
Discharge: HOME | End: 2022-04-01

## 2022-04-01 PROCEDURE — 97110 THERAPEUTIC EXERCISES: CPT

## 2022-04-01 PROCEDURE — 97016 VASOPNEUMATIC DEVICE THERAPY: CPT

## 2022-04-01 PROCEDURE — 97140 MANUAL THERAPY 1/> REGIONS: CPT

## 2022-04-01 NOTE — FLOWSHEET NOTE
[place] [address]  Phone: [phone] Fax: [phone]    Physical Therapy Treatment Note/ Progress Report:     Date:  2022    Patient Name:  Case Fernandes    :  2000  MRN: [id number]  Restrictions/Precautions:    Medical/Treatment Diagnosis Information:  · Diagnosis: right patellar tendonitis, left quad tendon partial tear  · Treatment Diagnosis: M25.561, I22.597  Insurance/Certification information:  PT Insurance Information: BCBS/[place]  Physician Information:  Referring Practitioner: Sneha Echols MD  Plan of care signed (Y/N):     Date of Patient follow up with Physician:      Progress Report: [x]  Yes  []  No     Date Range for reporting period:  Beginnin2022  Ending:     Progress report due (10 Rx/or 30 days whichever is less):      Recertification due (POC duration/ or 90 days whichever is less):      Visit # Insurance Allowable Auth Needed   2 60/MU []Yes   [x]No     Latex Allergy:  [x]NO      []YES  Preferred Language for Healthcare:   [x]English       []other:  Functional Scale: LEFS, 50% Date assessed: 2022    Pain level:  Rest- 0/10     SUBJECTIVE:  Patient reports that she is coming from lift and that she did an intense upper extremity workout with some LE work, very fatigued. She states that she is having continued right hip soreness and tightness. Felt good after stretching at initial visit.      OBJECTIVE:    Observation:    Test measurements:      RESTRICTIONS/PRECAUTIONS: none    Exercises/Interventions:     Therapeutic Ex (56973)   Min: 20' Sets/sec Reps Notes/CUES   Retro Stepper/BIKE      Alter G      BFR      Sportcord March      3 way SLR      SAQ      Hip stretches on sliders, quad stretch 5'     Hip Ext /table      BOSU fwd/side lunge      BOSU squat      Leg Press Ecc Full Range [place] on airex pad 2 20 Lateral and posterior   Lateral step down (dips) 2 10 6\"   Glute side walks 15' 2 blue   RDL 2 10 SL with green KB   Slide Lunge      Slide HS eccentrics      Step ups/ecc step down      Swissball wall rolls- in SLS- hip drive      Quad hip ext/wall-ball rolls      Hip Matrix       Manual Intervention (29140)  Min: 15'      Knee mobs/PROM      Tib/Fem Mobs      Patella Mobs      Ankle mobs      STM 1 5 Right hip flexor group, TFL   IASTM 1 5 Right Patellar tendon,      Right Hip 1 5 Grade 3-4 PA mobilizations   NMR re-education (68488)  Min:   CUES NEEDED   Slovenian/Biofeedback 10/10      BFR      G. Med activation      Hip Ext full ROM/ G. Activation      Bosu Bal and Prop- G Med      Single leg stance/Balance/Prop      Bosu Retro G. Med act      Prone Hip froggers- sliders/elevated      Lateral hip Slider 1 15ea, bilateral  4# chest press. Cues to sit posteriorly to avoid excessive knee flexion. Therapeutic Activity (79958)  Min:      MindSnackss      Brndstros      Dynamic Balance                            Therapeutic Exercise and NMR EXR  [x] (45713) Provided verbal/tactile cueing for activities related to strengthening, flexibility, endurance, ROM for improvements in LE, proximal hip, and core control with self care, mobility, lifting, ambulation. [x] (88050) Provided verbal/tactile cueing for activities related to improving balance, coordination, kinesthetic sense, posture, motor skill, proprioception  to assist with LE, proximal hip, and core control in self care, mobility, lifting, ambulation and eccentric single leg control.      NMR and Therapeutic Activities:    [x] (33280 or 10857) Provided verbal/tactile cueing for activities related to improving balance, coordination, kinesthetic sense, posture, motor skill, proprioception and motor activation to allow for proper function of core, proximal hip and LE with self care and ADLs and functional mobility.   [] (47037) Gait Re-education- Provided training and instruction to the patient for proper LE, core and proximal hip recruitment and positioning and eccentric body weight control with ambulation re-education including up and down stairs     Home Exercise Program:    [x] (95428) Reviewed/Progressed HEP activities related to strengthening, flexibility, endurance, ROM of core, proximal hip and LE for functional self-care, mobility, lifting and ambulation/stair navigation   [] (67005)Reviewed/Progressed HEP activities related to improving balance, coordination, kinesthetic sense, posture, motor skill, proprioception of core, proximal hip and LE for self care, mobility, lifting, and ambulation/stair navigation      Manual Treatments:  PROM / STM / Oscillations-Mobs:  G-I, II, III, IV (PA's, Inf., Post.)  [x] (29505) Provided manual therapy to mobilize LE, proximal hip and/or LS spine soft tissue/joints for the purpose of modulating pain, promoting relaxation,  increasing ROM, reducing/eliminating soft tissue swelling/inflammation/restriction, improving soft tissue extensibility and allowing for proper ROM for normal function with self care, mobility, lifting and ambulation. Modalities:     [] GAME READY (VASO)- for significant edema, swelling, pain control. Charges:  Timed Code Treatment Minutes: 35   Total Treatment Minutes: 45      [] EVAL (LOW) 15612 (typically 20 minutes face-to-face)  [] EVAL (MOD) 80536 (typically 30 minutes face-to-face)  [] EVAL (HIGH) 76934 (typically 45 minutes face-to-face)  [] RE-EVAL     [x] EM(20555) x   1  [] DRY NEEDLE 1 OR 2 MUSCLES  [] NMR (72670) x     [] DRY NEEDLE 3+ MUSCLES  [x] Manual (86610) x  1     [] TA (09108) x     [] [facility] Traction (87299)  [] ES(attended) (59639)     [] ES (un) (76013):   [x] VASO (41985)  [] Other:    If [facility] Please Indicate Time In/Out  CPT Code Time in Time out                                   GOALS:  Patient stated goal: Return to pain free sport activities.   [] Progressing: [] Met: [] Not Met: [] Adjusted    Therapist goals for Patient: Short Term Goals: To be achieved in: 2 weeks  1. Independent in HEP and progression per patient tolerance, in order to prevent re-injury. [] Progressing: [] Met: [] Not Met: [] Adjusted  2. Patient will have a decrease in pain to facilitate improvement in movement, function, and ADLs as indicated by Functional Deficits. [] Progressing: [] Met: [] Not Met: [] Adjusted    Long Term Goals: To be achieved in: 8 weeks  1. Disability index score of 10% or less for the LEFS to assist with reaching prior level of function. [] Progressing: [] Met: [] Not Met: [] Adjusted  2. Patient will demonstrate increased AROM for hip extension during functional length assessment Brian Actis Test) allow for proper joint functioning as indicated by patients Functional Deficits. [] Progressing: [] Met: [] Not Met: [] Adjusted  3. Patient will demonstrate an increase in Strength to good proximal hip strength and control, within 5lb HHD in LE to allow for proper functional mobility as indicated by patients Functional Deficits. [] Progressing: [] Met: [] Not Met: [] Adjusted  4. Patient will return to Jumping/Landing/Pivoting functional activities without increased symptoms or restriction. [] Progressing: [] Met: [] Not Met: [] Adjusted       ASSESSMENT:  Patient is making good gains with therapy and feels better with stretching to right hip and knee. She gets fatigued with weight room and workouts as well as training room work. We will have to manage volume in clinic and possibly switch our strengthening to more stability work than loaded strengthening work.      Return to Play: (if applicable)   []  Stage 1: Intro to Strength   []  Stage 2: Return to Run and Strength   []  Stage 3: Return to Jump and Strength   []  Stage 4: Dynamic Strength and Agility   []  Stage 5: Sport Specific Training     []  Ready to Return to Play, Meets All Above Stages   []  Not Ready for Return to Sports   Comments:            Treatment/Activity Tolerance:  [x] Patient tolerated treatment well [] Patient limited by fatique  [] Patient limited by pain  [] Patient limited by other medical complications  [] Other:     Overall Progression Towards Functional goals/ Treatment Progress Update:  [x] Patient is progressing as expected towards functional goals listed. [] Progression is slowed due to complexities/Impairments listed. [] Progression has been slowed due to co-morbidities. [] Plan just implemented, too soon to assess goals progression <30days   [] Goals require adjustment due to lack of progress  [] Patient is not progressing as expected and requires additional follow up with physician  [] Other    Prognosis for POC: [x] Good [] Fair  [] Poor    Patient requires continued skilled intervention: [x] Yes  [] No        PLAN:  [x] Continue per plan of care [] Alter current plan (see comments)  [] Plan of care initiated [] Hold pending MD visit [] Discharge    Electronically signed by:  Ryan Tsang SPT, [address], PT    Therapist was present, directed the patient's care, made skilled judgement, and was responsible for assessment and treatment of the patient    Note: If patient does not return for scheduled/recommended follow up visits, this note will serve as a discharge from care along with the most recent update on progress.

## 2022-04-08 ENCOUNTER — HOSPITAL ENCOUNTER (OUTPATIENT)
Dept: PHYSICAL THERAPY | Age: 22
Setting detail: THERAPIES SERIES
Discharge: HOME | End: 2022-04-08

## 2022-04-08 PROCEDURE — 97140 MANUAL THERAPY 1/> REGIONS: CPT

## 2022-04-08 PROCEDURE — 97110 THERAPEUTIC EXERCISES: CPT

## 2022-04-08 NOTE — FLOWSHEET NOTE
[place] [address] [address]  Phone: [phone] Fax: [phone]    Physical Therapy Treatment Note/ Progress Report:     Date:  2022    Patient Name:  Chuckie Riddle    :  2000  MRN: [id number]  Restrictions/Precautions:    Medical/Treatment Diagnosis Information:  · Diagnosis: right patellar tendonitis, left quad tendon partial tear  · Treatment Diagnosis: M25.561, J96.917  Insurance/Certification information:  PT Insurance Information: BCBS/AG/[place]  Physician Information:  Referring Practitioner: Supriya Arias MD  Plan of care signed (Y/N):     Date of Patient follow up with Physician:      Progress Report: [x]  Yes  []  No     Date Range for reporting period:  Beginnin2022  Ending:     Progress report due (10 Rx/or 30 days whichever is less):      Recertification due (POC duration/ or 90 days whichever is less):      Visit # Insurance Allowable Auth Needed   3 60/MU []Yes   [x]No     Latex Allergy:  [x]NO      []YES  Preferred Language for Healthcare:   [x]English       []other:  Functional Scale: LEFS, 50% Date assessed: 2022    Pain level:  Rest- 0/10     SUBJECTIVE:  Patient reports that she had treatment today with ATC and received graston to right patellar tendon. She has some tightness through right hip but not horrible.       OBJECTIVE:    Observation: tender through right patellar tendon and inferior pole of patella, improved with soft tissue, felt decreased stiffness through right hip after mobilization   Test measurements:      RESTRICTIONS/PRECAUTIONS: none    Exercises/Interventions:     Therapeutic Ex (10461)   Min: 20' Sets/sec Reps Notes/CUES   Retro Stepper/BIKE      Alter G      BFR      Sportcord March      3 way SLR      SAQ      Hip stretches on sliders, quad stretch 5'     Hip ext on leg press 2 10 40#   [place]ib[place]ian squat with slow decel 2 10          Leg Press Ecc Full Range [place] on airex pad   Lateral and posterior   Lateral step down (dips)   6\"   Glute side walks   blue   RDL   SL with green KB   Slide Lunge      Slide HS eccentrics      Step ups/ecc step down      Swissball wall rolls- in SLS- hip drive      Quad hip ext/wall-ball rolls      Hip Matrix       Manual Intervention (34841)  Min: 20'      Knee mobs/PROM      Tib/Fem Mobs      Patella Mobs      Ankle mobs      STM 1 5 Right hip flexor group, TFL   IASTM 1 5 Right Patellar tendon,      Right Hip 1 10 Grade 3-4 PA mobilizations/lateral belt traction   NMR re-education (03675)  Min:   CUES NEEDED   German/Biofeedback 10/10      BFR      G. Med activation      Hip Ext full ROM/ G. Activation      Bosu Bal and Prop- G Med      Single leg stance/Balance/Prop      Bosu Retro G. Med act      Prone Hip froggers- sliders/elevated      Lateral hip Slider 1 15ea, bilateral  4# chest press. Cues to sit posteriorly to avoid excessive knee flexion. Therapeutic Activity (87681)  Min:      Ladders      Plyos      Dynamic Balance                            Therapeutic Exercise and NMR EXR  [x] (52408) Provided verbal/tactile cueing for activities related to strengthening, flexibility, endurance, ROM for improvements in LE, proximal hip, and core control with self care, mobility, lifting, ambulation. [x] (40544) Provided verbal/tactile cueing for activities related to improving balance, coordination, kinesthetic sense, posture, motor skill, proprioception  to assist with LE, proximal hip, and core control in self care, mobility, lifting, ambulation and eccentric single leg control. NMR and Therapeutic Activities:    [x] (52026 or 91104) Provided verbal/tactile cueing for activities related to improving balance, coordination, kinesthetic sense, posture, motor skill, proprioception and motor activation to allow for proper function of core, proximal hip and LE with self care and ADLs and functional mobility.    [] (15187) Gait Re-education- Provided training and instruction to the patient for proper LE, core and proximal hip recruitment and positioning and eccentric body weight control with ambulation re-education including up and down stairs     Home Exercise Program:    [x] (23426) Reviewed/Progressed HEP activities related to strengthening, flexibility, endurance, ROM of core, proximal hip and LE for functional self-care, mobility, lifting and ambulation/stair navigation   [] (20535)Reviewed/Progressed HEP activities related to improving balance, coordination, kinesthetic sense, posture, motor skill, proprioception of core, proximal hip and LE for self care, mobility, lifting, and ambulation/stair navigation      Manual Treatments:  PROM / STM / Oscillations-Mobs:  G-I, II, III, IV (PA's, Inf., Post.)  [x] (62784) Provided manual therapy to mobilize LE, proximal hip and/or LS spine soft tissue/joints for the purpose of modulating pain, promoting relaxation,  increasing ROM, reducing/eliminating soft tissue swelling/inflammation/restriction, improving soft tissue extensibility and allowing for proper ROM for normal function with self care, mobility, lifting and ambulation. Modalities:     [] GAME READY (VASO)- for significant edema, swelling, pain control.      Charges:  Timed Code Treatment Minutes: 40   Total Treatment Minutes: 40      [] EVAL (LOW) 10874 (typically 20 minutes face-to-face)  [] EVAL (MOD) 41852 (typically 30 minutes face-to-face)  [] EVAL (HIGH) 46336 (typically 45 minutes face-to-face)  [] RE-EVAL      [x] JK(32709) x   1  [] DRY NEEDLE 1 OR 2 MUSCLES  [] NMR (81173) x     [] DRY NEEDLE 3+ MUSCLES  [x] Manual (87144) x  1     [] TA (42135) x     [] [facility] (79954)  [] ES(attended) (17703)     [] ES (un) (83493):   [] VASO (52456)  [] Other:    If BWC Please Indicate Time In/Out  CPT Code Time in Time out                                   GOALS:  Patient stated goal: Return to pain free sport activities. [] Progressing: [] Met: [] Not Met: [] Adjusted    Therapist goals for Patient:   Short Term Goals: To be achieved in: 2 weeks  1. Independent in HEP and progression per patient tolerance, in order to prevent re-injury. [] Progressing: [] Met: [] Not Met: [] Adjusted  2. Patient will have a decrease in pain to facilitate improvement in movement, function, and ADLs as indicated by Functional Deficits. [] Progressing: [] Met: [] Not Met: [] Adjusted    Long Term Goals: To be achieved in: 8 weeks  1. Disability index score of 10% or less for the LEFS to assist with reaching prior level of function. [] Progressing: [] Met: [] Not Met: [] Adjusted  2. Patient will demonstrate increased AROM for hip extension during functional length assessment Jaime Mcmillan Test) allow for proper joint functioning as indicated by patients Functional Deficits. [] Progressing: [] Met: [] Not Met: [] Adjusted  3. Patient will demonstrate an increase in Strength to good proximal hip strength and control, within 5lb HHD in LE to allow for proper functional mobility as indicated by patients Functional Deficits. [] Progressing: [] Met: [] Not Met: [] Adjusted  4. Patient will return to Jumping/Landing/Pivoting functional activities without increased symptoms or restriction. [] Progressing: [] Met: [] Not Met: [] Adjusted       ASSESSMENT:  Patient gets significant relief with stretching to right hip. Is making gains with ROM of hips, continued tenderness through inferior patella.      Return to Play: (if applicable)   []  Stage 1: Intro to Strength   []  Stage 2: Return to Run and Strength   []  Stage 3: Return to Jump and Strength   []  Stage 4: Dynamic Strength and Agility   []  Stage 5: Sport Specific Training     []  Ready to Return to Play, Meets All Above Stages   []  Not Ready for Return to Sports   Comments:            Treatment/Activity Tolerance:  [x] Patient tolerated treatment well [] Patient limited by jonathan  [] Patient limited by pain  [] Patient limited by other medical complications  [] Other:     Overall Progression Towards Functional goals/ Treatment Progress Update:  [x] Patient is progressing as expected towards functional goals listed. [] Progression is slowed due to complexities/Impairments listed. [] Progression has been slowed due to co-morbidities. [] Plan just implemented, too soon to assess goals progression <30days   [] Goals require adjustment due to lack of progress  [] Patient is not progressing as expected and requires additional follow up with physician  [] Other    Prognosis for POC: [x] Good [] Fair  [] Poor    Patient requires continued skilled intervention: [x] Yes  [] No        PLAN:  [x] Continue per plan of care [] Alter current plan (see comments)  [] Plan of care initiated [] Hold pending MD visit [] Discharge    Electronically signed by:  Karina Sam SPT, [id number], PT    Therapist was present, directed the patient's care, made skilled judgement, and was responsible for assessment and treatment of the patient    Note: If patient does not return for scheduled/recommended follow up visits, this note will serve as a discharge from care along with the most recent update on progress.

## 2022-04-11 ENCOUNTER — APPOINTMENT (OUTPATIENT)
Dept: PHYSICAL THERAPY | Age: 22
End: 2022-04-11

## 2022-04-13 ENCOUNTER — APPOINTMENT (OUTPATIENT)
Dept: PHYSICAL THERAPY | Age: 22
End: 2022-04-13

## 2022-04-15 ENCOUNTER — HOSPITAL ENCOUNTER (OUTPATIENT)
Dept: PHYSICAL THERAPY | Age: 22
Setting detail: THERAPIES SERIES
Discharge: HOME | End: 2022-04-15

## 2022-04-15 PROCEDURE — 97140 MANUAL THERAPY 1/> REGIONS: CPT

## 2022-04-15 PROCEDURE — 97110 THERAPEUTIC EXERCISES: CPT

## 2022-04-15 NOTE — FLOWSHEET NOTE
[facility] [address] [facility]  Phone: [phone] Fax: [phone]    Physical Therapy Treatment Note/ Progress Report:     Date:  4/15/2022    Patient Name:  Kyler Gregg    :  2000  MRN: [id number]  Restrictions/Precautions:    Medical/Treatment Diagnosis Information:  · Diagnosis: right patellar tendonitis, left quad tendon partial tear  · Treatment Diagnosis: M25.561, D77.794  Insurance/Certification information:  PT Insurance Information: BCBS/AG/[place]  Physician Information:  Referring Practitioner: Yoselyn Barbosa MD  Plan of care signed (Y/N):     Date of Patient follow up with Physician:      Progress Report: [x]  Yes  []  No     Date Range for reporting period:  Beginnin2022  Ending:     Progress report due (10 Rx/or 30 days whichever is less): 15/56/0718     Recertification due (POC duration/ or 90 days whichever is less):      Visit # Insurance Allowable Auth Needed   4 60/MU []Yes   [x]No     Latex Allergy:  [x]NO      []YES  Preferred Language for Healthcare:   [x]English       []other:  Functional Scale: LEFS, 50% Date assessed: 2022    Pain level:  Rest- 0/10     SUBJECTIVE:  Patient reports that her hips have been feeling good, she is mainly having pain in her right patellar tendon. OBJECTIVE:    Observation: tender through right patellar tendon and inferior pole of patella, improved with soft tissue, felt decreased stiffness through right hip after mobilization   Test measurements:      RESTRICTIONS/PRECAUTIONS: none    Exercises/Interventions:     Therapeutic Ex (41580)   Min: 10' Sets/sec Reps Notes/CUES   Retro Stepper/BIKE      Alter G      BFR      Sportcord March      3 way SLR      SAQ      Hip stretches on sliders, quad stretch Hip ext on leg press [place] squat with slow decel 2 10ea Red KB. 12\" box   Split Squat jumps 2 10ea With Green Resistance into genu valgum. Focus on landing control. Leg Press Ecc Full Range [place] on airex pad   Lateral and posterior   Lateral step down (dips)   6\"   Glute side walks   blue   RDL   SL with green KB   Slide Lunge      Slide HS eccentrics      Step ups/ecc step down      Swissball wall rolls- in SLS- hip drive      Quad hip ext/wall-ball rolls      Hip Matrix       Manual Intervention (62073)  Min: 20'      Knee mobs/PROM      Tib/Fem Mobs      Patella Mobs      Ankle mobs      STM/IASTM 1 10 Right quadriceps (VL, RF), patellar tendon     Right Hip 1 10 Grade 3-4 PA mobilizations/LA   NMR re-education (08268)  Min:   CUES NEEDED   Nigerien/Biofeedback 10/10      BFR      G. Med activation      Hip Ext full ROM/ G. Activation      Bosu Bal and Prop- G Med      Single leg stance/Balance/Prop      Bosu Retro G. Med act      Prone Hip froggers- sliders/elevated      Lateral hip Slider Therapeutic Activity (24831)  Min: 5      Ladders      Plyos      Dynamic Balance      Step up with sport cord (blue) 2 10-15ea Knee drive/layup with slow eccentric lower                   Therapeutic Exercise and NMR EXR  [x] (44964) Provided verbal/tactile cueing for activities related to strengthening, flexibility, endurance, ROM for improvements in LE, proximal hip, and core control with self care, mobility, lifting, ambulation. [x] (10283) Provided verbal/tactile cueing for activities related to improving balance, coordination, kinesthetic sense, posture, motor skill, proprioception  to assist with LE, proximal hip, and core control in self care, mobility, lifting, ambulation and eccentric single leg control.      NMR and Therapeutic Activities:    [x] (14662 or 54166) Provided verbal/tactile cueing for activities related to improving balance, coordination, kinesthetic sense, posture, motor skill, proprioception and motor activation to allow for proper function of core, proximal hip and LE with self care and ADLs and functional mobility.   [] (08350) activities. [] Progressing: [] Met: [] Not Met: [] Adjusted    Therapist goals for Patient:   Short Term Goals: To be achieved in: 2 weeks  1. Independent in HEP and progression per patient tolerance, in order to prevent re-injury. [] Progressing: [] Met: [] Not Met: [] Adjusted  2. Patient will have a decrease in pain to facilitate improvement in movement, function, and ADLs as indicated by Functional Deficits. [] Progressing: [] Met: [] Not Met: [] Adjusted    Long Term Goals: To be achieved in: 8 weeks  1. Disability index score of 10% or less for the LEFS to assist with reaching prior level of function. [] Progressing: [] Met: [] Not Met: [] Adjusted  2. Patient will demonstrate increased AROM for hip extension during functional length assessment Roxana Jennifer Test) allow for proper joint functioning as indicated by patients Functional Deficits. [] Progressing: [] Met: [] Not Met: [] Adjusted  3. Patient will demonstrate an increase in Strength to good proximal hip strength and control, within 5lb HHD in LE to allow for proper functional mobility as indicated by patients Functional Deficits. [] Progressing: [] Met: [] Not Met: [] Adjusted  4. Patient will return to Jumping/Landing/Pivoting functional activities without increased symptoms or restriction. [] Progressing: [] Met: [] Not Met: [] Adjusted       ASSESSMENT:  Braden Benjamin is demonstrating improving in her hip ROM. She continues to note pain in the patellar tendon with loading. Session focused on progressing proximal hip control to improve functional knee loading mechanics.     Return to Play: (if applicable)   []  Stage 1: Intro to Strength   []  Stage 2: Return to Run and Strength   []  Stage 3: Return to Jump and Strength   []  Stage 4: Dynamic Strength and Agility   []  Stage 5: Sport Specific Training     []  Ready to Return to Play, Meets All Above Stages   []  Not Ready for Return to Sports   Comments:            Treatment/Activity Tolerance:  [x] Patient tolerated treatment well [] Patient limited by fatique  [] Patient limited by pain  [] Patient limited by other medical complications  [] Other:     Overall Progression Towards Functional goals/ Treatment Progress Update:  [x] Patient is progressing as expected towards functional goals listed. [] Progression is slowed due to complexities/Impairments listed. [] Progression has been slowed due to co-morbidities. [] Plan just implemented, too soon to assess goals progression <30days   [] Goals require adjustment due to lack of progress  [] Patient is not progressing as expected and requires additional follow up with physician  [] Other    Prognosis for POC: [x] Good [] Fair  [] Poor    Patient requires continued skilled intervention: [x] Yes  [] No        PLAN:  [x] Continue per plan of care [] Alter current plan (see comments)  [] Plan of care initiated [] Hold pending MD visit [] Discharge    Electronically signed by:  Lito Middleton SPT, [id number] [place], PT    Therapist was present, directed the patient's care, made skilled judgement, and was responsible for assessment and treatment of the patient    Note: If patient does not return for scheduled/recommended follow up visits, this note will serve as a discharge from care along with the most recent update on progress.

## 2022-04-19 ENCOUNTER — OFFICE VISIT (OUTPATIENT)
Dept: ORTHOPEDIC SURGERY | Age: 22
End: 2022-04-19
Payer: COMMERCIAL

## 2022-04-19 VITALS
TEMPERATURE: 97 F | HEART RATE: 75 BPM | BODY MASS INDEX: 25.73 KG/M2 | WEIGHT: 190 LBS | OXYGEN SATURATION: 98 % | SYSTOLIC BLOOD PRESSURE: 117 MMHG | DIASTOLIC BLOOD PRESSURE: 73 MMHG | HEIGHT: 72 IN

## 2022-04-19 DIAGNOSIS — F41.9 ANXIETY: Primary | ICD-10-CM

## 2022-04-19 PROCEDURE — 99204 OFFICE O/P NEW MOD 45 MIN: CPT | Performed by: STUDENT IN AN ORGANIZED HEALTH CARE EDUCATION/TRAINING PROGRAM

## 2022-04-19 RX ORDER — DROSPIRENONE AND ETHINYL ESTRADIOL 0.02-3(28)
KIT ORAL
COMMUNITY
Start: 2022-03-31

## 2022-04-19 NOTE — PROGRESS NOTES
CC:   Chief Complaint   Patient presents with   [facility] Anxiety     Therapist suggested she get evaluated for anxiety medication. HPI  Mickey Zhang is a 25 y.o. female here for anxiety. She has had anxiety for over a year and has been seeing a counselor for the past year. She is doing pretty well overall with control her symptoms with therapy, but they still do remain inadequately controlled so her therapist recommended her be evaluated for medications. She is at the point now where she is interested in trying an SSRI while she is in the off season. Review of Systems  As above. Vitals:    04/19/22 0807   BP: 117/73   Site: Right Upper Arm   Position: Sitting   Cuff Size: Small Adult   Pulse: 75   Temp: 97 °F (36.1 °C)   TempSrc: Temporal   SpO2: 98%   Weight: 190 lb (86.2 kg)   Height: 6' 2\" (1.88 m)     Physical Exam  Vitals reviewed. Constitutional:       General: She is not in acute distress. Appearance: She is not ill-appearing. HENT:      Head: Normocephalic and atraumatic. Nose: No congestion. Eyes:      Extraocular Movements: Extraocular movements intact. Conjunctiva/sclera: Conjunctivae normal.   Cardiovascular:      Rate and Rhythm: Normal rate and regular rhythm. Pulmonary:      Effort: Pulmonary effort is normal. No respiratory distress. Breath sounds: No wheezing or rales. Musculoskeletal:      Cervical back: Normal range of motion. Skin:     General: Skin is warm and dry. Neurological:      Mental Status: She is alert and oriented to person, place, and time. Psychiatric:         Mood and Affect: Mood normal.         Behavior: Behavior normal.         A/P  1. Anxiety  Assessment & Plan:  Her anxiety has been inadequately controlled with just therapy. After discussing the risks and benefits of taking an SSRI we decided to start her on sertraline 50 mg daily. She will follow-up in around 3 weeks before heading home for break.   Orders:  -     sertraline (ZOLOFT) 50 MG tablet; Take 1 tablet by mouth daily, Disp-30 tablet, R-0Normal      Agus Marin MD  Internal Medicine and Sports Medicine    Please note that this chart was at least partially generated using dragon dictation software. Although every effort was made to ensure the accuracy of this automated transcription, some errors in transcription may have occurred.

## 2022-04-19 NOTE — ASSESSMENT & PLAN NOTE
Her anxiety has been inadequately controlled with just therapy. After discussing the risks and benefits of taking an SSRI we decided to start her on sertraline 50 mg daily. She will follow-up in around 3 weeks before heading home for break.

## 2022-04-22 ENCOUNTER — HOSPITAL ENCOUNTER (OUTPATIENT)
Dept: PHYSICAL THERAPY | Age: 22
Setting detail: THERAPIES SERIES
Discharge: HOME | End: 2022-04-22

## 2022-04-22 PROCEDURE — 97110 THERAPEUTIC EXERCISES: CPT

## 2022-04-22 PROCEDURE — 97140 MANUAL THERAPY 1/> REGIONS: CPT

## 2022-04-22 NOTE — FLOWSHEET NOTE
[facility] [address] [address]  Phone: [phone] Fax: [phone]    Physical Therapy Treatment Note/ Progress Report:     Date:  2022    Patient Name:  Juany Huitron    :  2000  MRN: [id number]  Restrictions/Precautions:    Medical/Treatment Diagnosis Information:  · Diagnosis: right patellar tendonitis, left quad tendon partial tear  · Treatment Diagnosis: M25.561, Q61.288  Insurance/Certification information:  PT Insurance Information: BCBS/AG/[place]  Physician Information:  Referring Practitioner: Erika Garcia MD  Plan of care signed (Y/N):     Date of Patient follow up with Physician:      Progress Report: [x]  Yes  []  No     Date Range for reporting period:  Beginnin2022  Ending:     Progress report due (10 Rx/or 30 days whichever is less):      Recertification due (POC duration/ or 90 days whichever is less):      Visit # Insurance Allowable Auth Needed   5 60/MU []Yes   [x]No     Latex Allergy:  [x]NO      []YES  Preferred Language for Healthcare:   [x]English       []other:  Functional Scale: LEFS, 50% Date assessed: 2022    Pain level:  Rest- 0/10     SUBJECTIVE:  Patient states continued improvement in right hip and both knees. Some soreness in right knee patellar tendon. OBJECTIVE:    Observation: tender through right patellar tendon and inferior pole of patella, improved with soft tissue and ice massage   Test measurements:      RESTRICTIONS/PRECAUTIONS: none    Exercises/Interventions:     Therapeutic Ex (63572)   Min: 30' Sets/sec Reps Notes/CUES   Retro Stepper/BIKE      Alter G      BFR      Sportcord March      3 way SLR      SAQ      Hip stretches on sliders, quad stretch Hip ext on leg press Tam[place] squat with slow decel Red KB. 12\" box   Split Squat jumps With Green Resistance into genu valgum. Focus on landing control.    Leg Press Ecc Full Range [facility] on airex pad   Lateral and posterior   Lateral step down (dips) 2 10 6\"   Glute side walks 15' 2 White at knees   Leg press hip ext 2 10 40#   Slide Lunge with dribble 1 5 Each leg   BOSU squat with shot 1 10          Swissball wall rolls- in SLS- hip drive      Quad hip ext/wall-ball rolls      Hip Matrix       Manual Intervention (15589)  Min: 15'      Knee mobs/PROM      Tib/Fem Mobs      Patella Mobs      Ankle mobs      STM/IASTM 1 15 Right quadriceps (VL, RF), patellar tendon     Right Hip      NMR re-education (39907)  Min:   CUES NEEDED   Czech/Biofeedback 10/10      BFR      G. Med activation      Hip Ext full ROM/ G. Activation      Bosu Bal and Prop- G Med      Single leg stance/Balance/Prop      Bosu Retro G. Med act      Prone Hip froggers- sliders/elevated      Lateral hip Slider Therapeutic Activity (99039)  Min:       Ladders      Plyos      Dynamic Balance      Step up with sport cord (blue) Knee drive/layup with slow eccentric lower                   Therapeutic Exercise and NMR EXR  [x] (42645) Provided verbal/tactile cueing for activities related to strengthening, flexibility, endurance, ROM for improvements in LE, proximal hip, and core control with self care, mobility, lifting, ambulation. [x] (93171) Provided verbal/tactile cueing for activities related to improving balance, coordination, kinesthetic sense, posture, motor skill, proprioception  to assist with LE, proximal hip, and core control in self care, mobility, lifting, ambulation and eccentric single leg control.      NMR and Therapeutic Activities:    [x] (52917 or 72153) Provided verbal/tactile cueing for activities related to improving balance, coordination, kinesthetic sense, posture, motor skill, proprioception and motor activation to allow for proper function of core, proximal hip and LE with self care and ADLs and functional mobility.   [] (52659) Gait Re-education- Provided training and instruction to the patient for proper LE, core and proximal hip recruitment and positioning and eccentric body weight control with ambulation re-education including up and down stairs     Home Exercise Program:    [x] (37538) Reviewed/Progressed HEP activities related to strengthening, flexibility, endurance, ROM of core, proximal hip and LE for functional self-care, mobility, lifting and ambulation/stair navigation   [] (21691)Reviewed/Progressed HEP activities related to improving balance, coordination, kinesthetic sense, posture, motor skill, proprioception of core, proximal hip and LE for self care, mobility, lifting, and ambulation/stair navigation      Manual Treatments:  PROM / STM / Oscillations-Mobs:  G-I, II, III, IV (PA's, Inf., Post.)  [x] (74011) Provided manual therapy to mobilize LE, proximal hip and/or LS spine soft tissue/joints for the purpose of modulating pain, promoting relaxation,  increasing ROM, reducing/eliminating soft tissue swelling/inflammation/restriction, improving soft tissue extensibility and allowing for proper ROM for normal function with self care, mobility, lifting and ambulation. Modalities:     [] GAME READY (VASO)- for significant edema, swelling, pain control. Charges:  Timed Code Treatment Minutes: 45   Total Treatment Minutes: 45      [] EVAL (LOW) 21487 (typically 20 minutes face-to-face)  [] EVAL (MOD) 31522 (typically 30 minutes face-to-face)  [] EVAL (HIGH) 13991 (typically 45 minutes face-to-face)  [] RE-EVAL      [x] JV(18458) x   2  [] DRY NEEDLE 1 OR 2 MUSCLES  [] NMR (58349) x     [] DRY NEEDLE 3+ MUSCLES  [x] Manual (22208) x  1     [] TA (78006) x     [] [facility] (44921)  [] ES(attended) (58472)     [] ES (un) (07581):   [] VASO (50673)  [] Other:    If [facility] Please Indicate Time In/Out  CPT Code Time in Time out                                   GOALS:  Patient stated goal: Return to pain free sport activities.   [] Progressing: [] Met: [] Not Met: [] Adjusted    Therapist goals for Patient:   Short Term Goals: To be achieved in: 2 weeks  1. Independent in HEP and progression per patient tolerance, in order to prevent re-injury. [] Progressing: [] Met: [] Not Met: [] Adjusted  2. Patient will have a decrease in pain to facilitate improvement in movement, function, and ADLs as indicated by Functional Deficits. [] Progressing: [] Met: [] Not Met: [] Adjusted    Long Term Goals: To be achieved in: 8 weeks  1. Disability index score of 10% or less for the LEFS to assist with reaching prior level of function. [] Progressing: [] Met: [] Not Met: [] Adjusted  2. Patient will demonstrate increased AROM for hip extension during functional length assessment Giuseppe Satinder Test) allow for proper joint functioning as indicated by patients Functional Deficits. [] Progressing: [] Met: [] Not Met: [] Adjusted  3. Patient will demonstrate an increase in Strength to good proximal hip strength and control, within 5lb HHD in LE to allow for proper functional mobility as indicated by patients Functional Deficits. [] Progressing: [] Met: [] Not Met: [] Adjusted  4. Patient will return to Jumping/Landing/Pivoting functional activities without increased symptoms or restriction. [] Progressing: [] Met: [] Not Met: [] Adjusted       ASSESSMENT:  Brandi Ricketts felt better after treatment and ice massage. She is doing much better through right hip and seems to be managing symptoms much better overall through right knee.      Return to Play: (if applicable)   []  Stage 1: Intro to Strength   []  Stage 2: Return to Run and Strength   []  Stage 3: Return to Jump and Strength   []  Stage 4: Dynamic Strength and Agility   []  Stage 5: Sport Specific Training     []  Ready to Return to Play, Meets All Above Stages   []  Not Ready for Return to Sports   Comments:            Treatment/Activity Tolerance:  [x] Patient tolerated treatment well [] Patient limited by fatique  [] Patient limited by pain  [] Patient limited by other medical complications  [] Other:     Overall Progression Towards Functional goals/ Treatment Progress Update:  [x] Patient is progressing as expected towards functional goals listed. [] Progression is slowed due to complexities/Impairments listed. [] Progression has been slowed due to co-morbidities. [] Plan just implemented, too soon to assess goals progression <30days   [] Goals require adjustment due to lack of progress  [] Patient is not progressing as expected and requires additional follow up with physician  [] Other    Prognosis for POC: [x] Good [] Fair  [] Poor    Patient requires continued skilled intervention: [x] Yes  [] No        PLAN:  [x] Continue per plan of care [] Alter current plan (see comments)  [] Plan of care initiated [] Hold pending MD visit [] Discharge    Electronically signed by:  Linette Mendez SPT, [id number], PT    Therapist was present, directed the patient's care, made skilled judgement, and was responsible for assessment and treatment of the patient    Note: If patient does not return for scheduled/recommended follow up visits, this note will serve as a discharge from care along with the most recent update on progress.

## 2022-04-29 ENCOUNTER — HOSPITAL ENCOUNTER (OUTPATIENT)
Dept: PHYSICAL THERAPY | Age: 22
Setting detail: THERAPIES SERIES
Discharge: HOME | End: 2022-04-29

## 2022-04-29 ENCOUNTER — APPOINTMENT (OUTPATIENT)
Dept: PHYSICAL THERAPY | Age: 22
End: 2022-04-29

## 2022-04-29 PROCEDURE — 97110 THERAPEUTIC EXERCISES: CPT

## 2022-04-29 PROCEDURE — 97140 MANUAL THERAPY 1/> REGIONS: CPT

## 2022-04-29 NOTE — FLOWSHEET NOTE
[facility] [address] [address]  Phone: [phone] Fax: [phone]    Physical Therapy Treatment Note/ Progress Report:     Date:  2022    Patient Name:  Christina Alvarenga    :  2000  MRN: [id number]  Restrictions/Precautions:    Medical/Treatment Diagnosis Information:  · Diagnosis: right patellar tendonitis, left quad tendon partial tear  · Treatment Diagnosis: M25.561, E31.698  Insurance/Certification information:  PT Insurance Information: BCBS/AG/[place]  Physician Information:  Referring Practitioner: Logan Diaz MD  Plan of care signed (Y/N):     Date of Patient follow up with Physician:      Progress Report: [x]  Yes  []  No     Date Range for reporting period:  Beginnin2022  Ending:     Progress report due (10 Rx/or 30 days whichever is less):      Recertification due (POC duration/ or 90 days whichever is less):      Visit # Insurance Allowable Auth Needed   6 60/MU []Yes   [x]No     Latex Allergy:  [x]NO      []YES  Preferred Language for Healthcare:   [x]English       []other:  Functional Scale: LEFS, 50% Date assessed: 2022    Pain level:  Rest- 0/10     SUBJECTIVE:  Patient reports overall feeling much better than when we started PT. No real hip pain recently and much less right knee pain. OBJECTIVE:    Observation: tender through right patellar tendon and inferior pole of patella, improved quad flexibility   Test measurements:      RESTRICTIONS/PRECAUTIONS: none    Exercises/Interventions:     Therapeutic Ex (58648)   Min: 20' Sets/sec Reps Notes/CUES   Retro Stepper/BIKE      Alter G      BFR      Sportcord March      3 way SLR      SAQ      Hip stretches on sliders, quad stretch Hip ext on leg press 2 10 40#Yoruba squat with slow decel Red KB. 12\" box   Split Squat jumps With Green Resistance into genu valgum. Focus on landing control.    Leg Press Ecc Full Range [place] on airex pad   Lateral and posterior   Lateral step down (dips) 6\"   Glute side walks White at knees   Leg press hip ext Slide Lunge with dribble 1 5 Each leg   BOSU squat with shot 1 10    Resisted step up 2 10    Swissball wall rolls- in SLS- hip drive      Quad hip ext/wall-ball rolls      Hip Matrix       Manual Intervention (31011)  Min: 15'      Knee mobs/PROM      Tib/Fem Mobs      Patella Mobs      Ankle mobs      STM/IASTM 1 15 Right quadriceps (VL, RF), patellar tendon     Right Hip      NMR re-education (79220)  Min:   CUES NEEDED   Iranian/Biofeedback 10/10      BFR      G. Med activation      Hip Ext full ROM/ G. Activation      Bosu Bal and Prop- G Med      Single leg stance/Balance/Prop      Bosu Retro G. Med act      Prone Hip froggers- sliders/elevated      Lateral hip Slider Therapeutic Activity (61530)  Min:       Ladders      Plyos      Dynamic Balance      Step up with sport cord (blue) Knee drive/layup with slow eccentric lower                   Therapeutic Exercise and NMR EXR  [x] (91619) Provided verbal/tactile cueing for activities related to strengthening, flexibility, endurance, ROM for improvements in LE, proximal hip, and core control with self care, mobility, lifting, ambulation. [x] (98495) Provided verbal/tactile cueing for activities related to improving balance, coordination, kinesthetic sense, posture, motor skill, proprioception  to assist with LE, proximal hip, and core control in self care, mobility, lifting, ambulation and eccentric single leg control.      NMR and Therapeutic Activities:    [x] (99490 or 09578) Provided verbal/tactile cueing for activities related to improving balance, coordination, kinesthetic sense, posture, motor skill, proprioception and motor activation to allow for proper function of core, proximal hip and LE with self care and ADLs and functional mobility.   [] (53829) Gait Re-education- Provided training and instruction to the patient for proper LE, core and proximal hip recruitment and positioning and eccentric body weight control with ambulation re-education including up and down stairs     Home Exercise Program:    [x] (00365) Reviewed/Progressed HEP activities related to strengthening, flexibility, endurance, ROM of core, proximal hip and LE for functional self-care, mobility, lifting and ambulation/stair navigation   [] (94000)Reviewed/Progressed HEP activities related to improving balance, coordination, kinesthetic sense, posture, motor skill, proprioception of core, proximal hip and LE for self care, mobility, lifting, and ambulation/stair navigation      Manual Treatments:  PROM / STM / Oscillations-Mobs:  G-I, II, III, IV (PA's, Inf., Post.)  [x] (91139) Provided manual therapy to mobilize LE, proximal hip and/or LS spine soft tissue/joints for the purpose of modulating pain, promoting relaxation,  increasing ROM, reducing/eliminating soft tissue swelling/inflammation/restriction, improving soft tissue extensibility and allowing for proper ROM for normal function with self care, mobility, lifting and ambulation. Modalities:     [] GAME READY (VASO)- for significant edema, swelling, pain control. Charges:  Timed Code Treatment Minutes: 35   Total Treatment Minutes: 35      [] EVAL (LOW) 35788 (typically 20 minutes face-to-face)  [] EVAL (MOD) 86374 (typically 30 minutes face-to-face)  [] EVAL (HIGH) 34665 (typically 45 minutes face-to-face)  [] RE-EVAL      [x] SS(13526) x   1  [] DRY NEEDLE 1 OR 2 MUSCLES  [] NMR (19390) x     [] DRY NEEDLE 3+ MUSCLES  [x] Manual (86839) x  1     [] TA (91658) x     [] Mech Traction (39970)  [] ES(attended) (15316)     [] ES (un) (07142):   [] VASO (11397)  [] Other:    If [facility] Please Indicate Time In/Out  CPT Code Time in Time out                                   GOALS:  Patient stated goal: Return to pain free sport activities.   [] Progressing: [] Met: [] Not Met: [] Adjusted    Therapist goals for Patient:   Short Term Goals: To be achieved in: 2 weeks  1. Independent in HEP and progression per patient tolerance, in order to prevent re-injury. [] Progressing: [] Met: [] Not Met: [] Adjusted  2. Patient will have a decrease in pain to facilitate improvement in movement, function, and ADLs as indicated by Functional Deficits. [] Progressing: [] Met: [] Not Met: [] Adjusted    Long Term Goals: To be achieved in: 8 weeks  1. Disability index score of 10% or less for the LEFS to assist with reaching prior level of function. [] Progressing: [] Met: [] Not Met: [] Adjusted  2. Patient will demonstrate increased AROM for hip extension during functional length assessment Yina Pavon Test) allow for proper joint functioning as indicated by patients Functional Deficits. [] Progressing: [] Met: [] Not Met: [] Adjusted  3. Patient will demonstrate an increase in Strength to good proximal hip strength and control, within 5lb HHD in LE to allow for proper functional mobility as indicated by patients Functional Deficits. [] Progressing: [] Met: [] Not Met: [] Adjusted  4. Patient will return to Jumping/Landing/Pivoting functional activities without increased symptoms or restriction. [] Progressing: [] Met: [] Not Met: [] Adjusted       ASSESSMENT:  Hector Sherwood is making good gains with therapy. She is having far less tenderness through patellar tendon and hip motion has improved significantly.      Return to Play: (if applicable)   []  Stage 1: Intro to Strength   []  Stage 2: Return to Run and Strength   []  Stage 3: Return to Jump and Strength   []  Stage 4: Dynamic Strength and Agility   []  Stage 5: Sport Specific Training     []  Ready to Return to Play, Meets All Above Stages   []  Not Ready for Return to Sports   Comments:            Treatment/Activity Tolerance:  [x] Patient tolerated treatment well [] Patient limited by fatique  [] Patient limited by pain  [] Patient limited by other medical complications  [] Other: Overall Progression Towards Functional goals/ Treatment Progress Update:  [x] Patient is progressing as expected towards functional goals listed. [] Progression is slowed due to complexities/Impairments listed. [] Progression has been slowed due to co-morbidities. [] Plan just implemented, too soon to assess goals progression <30days   [] Goals require adjustment due to lack of progress  [] Patient is not progressing as expected and requires additional follow up with physician  [] Other    Prognosis for POC: [x] Good [] Fair  [] Poor    Patient requires continued skilled intervention: [x] Yes  [] No        PLAN:  [x] Continue per plan of care [] Alter current plan (see comments)  [] Plan of care initiated [] Hold pending MD visit [] Discharge    Electronically signed by:     Srinivasan Tovar PT      Note: If patient does not return for scheduled/recommended follow up visits, this note will serve as a discharge from care along with the most recent update on progress.

## 2022-05-06 ENCOUNTER — APPOINTMENT (OUTPATIENT)
Dept: PHYSICAL THERAPY | Age: 22
End: 2022-05-06
Payer: COMMERCIAL

## 2022-05-10 ENCOUNTER — OFFICE VISIT (OUTPATIENT)
Dept: ORTHOPEDIC SURGERY | Age: 22
End: 2022-05-10
Payer: COMMERCIAL

## 2022-05-10 VITALS
HEIGHT: 72 IN | BODY MASS INDEX: 27.22 KG/M2 | DIASTOLIC BLOOD PRESSURE: 68 MMHG | SYSTOLIC BLOOD PRESSURE: 105 MMHG | OXYGEN SATURATION: 99 % | WEIGHT: 201 LBS | HEART RATE: 73 BPM | TEMPERATURE: 97.2 F

## 2022-05-10 DIAGNOSIS — F41.9 ANXIETY: Primary | ICD-10-CM

## 2022-05-10 PROCEDURE — 99214 OFFICE O/P EST MOD 30 MIN: CPT | Performed by: STUDENT IN AN ORGANIZED HEALTH CARE EDUCATION/TRAINING PROGRAM

## 2022-05-10 NOTE — PROGRESS NOTES
CC:   Chief Complaint   Patient presents with    Follow-up     Med check. Slight stomach irritation but has been feeling better on meds. HPI  Unruly Connors is a 25 y.o. female here for a follow-up appointment for anxiety. Anxiety: started on zoloft 50mg last visit, was having some difficulty with loss of appetite when she started on it. Decreased the dose to 25mg and reports no more SE, but does feel that her symptoms are improved and is happy with her current dose. Review of Systems  As above. Vitals:    05/10/22 0958   BP: 105/68   Site: Right Upper Arm   Position: Sitting   Cuff Size: Small Adult   Pulse: 73   Temp: 97.2 °F (36.2 °C)   TempSrc: Temporal   SpO2: 99%   Weight: 201 lb (91.2 kg)   Height: 6' 2\" (1.88 m)     Physical Exam  Vitals reviewed. Constitutional:       General: She is not in acute distress. Appearance: She is not ill-appearing. HENT:      Head: Normocephalic and atraumatic. Nose: No congestion. Eyes:      Extraocular Movements: Extraocular movements intact. Conjunctiva/sclera: Conjunctivae normal.   Pulmonary:      Effort: Pulmonary effort is normal. No respiratory distress. Musculoskeletal:      Cervical back: Normal range of motion. Skin:     General: Skin is warm and dry. Neurological:      Mental Status: She is alert and oriented to person, place, and time. Psychiatric:         Mood and Affect: Mood normal.         Behavior: Behavior normal.         A/P  1. Anxiety  Assessment & Plan:  Doing well on the reduced dose. Feels good at this dose and doesn't want to make any changes today. May want to during basketball season/when they start back up at practice. Will refill meds. Orders:  -     sertraline (ZOLOFT) 50 MG tablet; Take 1 tablet by mouth daily, Disp-30 tablet, R-3Normal      Paul Heller MD  Internal Medicine and Sports Medicine    Please note that this chart was at least partially generated using dragon dictation software. Although every effort was made to ensure the accuracy of this automated transcription, some errors in transcription may have occurred.

## 2022-05-10 NOTE — ASSESSMENT & PLAN NOTE
Doing well on the reduced dose. Feels good at this dose and doesn't want to make any changes today. May want to during basketball season/when they start back up at practice. Will refill meds.

## 2022-05-11 ENCOUNTER — HOSPITAL ENCOUNTER (OUTPATIENT)
Dept: PHYSICAL THERAPY | Age: 22
Setting detail: THERAPIES SERIES
Discharge: HOME | End: 2022-05-11
Payer: COMMERCIAL

## 2022-05-11 PROCEDURE — 97140 MANUAL THERAPY 1/> REGIONS: CPT

## 2022-05-11 PROCEDURE — 97110 THERAPEUTIC EXERCISES: CPT

## 2022-05-11 NOTE — FLOWSHEET NOTE
[place] [address] [address]  Phone: [phone] Fax: [phone]        Physical Therapy Re-Certification Plan of [facility]      Dear Dr. Manny Murrieta  ,    We had the pleasure of treating the following patient for physical therapy services at [address] [place] [address]. A summary of our findings can be found in the updated assessment below. This includes our plan of care. If you have any questions or concerns regarding these findings, please do not hesitate to contact me at the office phone number checked above.   Thank you for the referral.     Physician Signature:________________________________Date:__________________  By signing above (or electronic signature), therapists plan is approved by physician    Date Range Of Visits: 3/17/2022-2022  Total Visits to Date: 7  Overall Response to Treatment:   [x]Patient is responding well to treatment and improvement is noted with regards  to goals   [x]Patient should continue to improve in reasonable time if they continue HEP   []Patient has plateaued and is no longer responding to skilled PT intervention    []Patient is getting worse and would benefit from return to referring MD   []Patient unable to adhere to initial POC   []Other:       Date:  2022    Patient Name:  Case Fernandes    :  2000  MRN: [id number]  Restrictions/Precautions:    Medical/Treatment Diagnosis Information:  · Diagnosis: right patellar tendonitis, left quad tendon partial tear  · Treatment Diagnosis: M25.561, C85.527  Insurance/Certification information:  PT Insurance Information: BCBS//[place]  Physician Information:  Referring Practitioner: Sneha Echols MD  Plan of care signed (Y/N):     Date of Patient follow up with Physician:      Progress Report: [x]  Yes  []  No     Date Range for reporting period:  Beginnin2022  Endin2022    Progress report due (10 Rx/or 30 days whichever is less): 32/71/4781     Recertification due (POC duration/ or 90 days whichever is less): [id number]/[id number]     Visit # Insurance Allowable Auth Needed   7 60/MU []Yes   [x]No     Latex Allergy:  [x]NO      []YES  Preferred Language for Healthcare:   [x]English       []other:  Functional Scale: LEFS, 50% Date assessed: 03/17/2022    Pain level:  Rest- 0/10     SUBJECTIVE:  Patient reports right hip has been slightly more tight which she attributes to not doing much stretching recently. She reports overall doing much better with knee pain since not being in the gym as much. OBJECTIVE:    Observation: tender through right patellar tendon and inferior pole of patella, improved quad flexibility   Test measurements:  Right hip ROM WFL in all planes with noted improvement in capsular mobility    RESTRICTIONS/PRECAUTIONS: none    Exercises/Interventions:     Therapeutic Ex (83971)   Min: 25' Sets/sec Reps Notes/CUES   Retro Stepper/BIKE      Alter G      BFR      Resisted walk 1 10    RDL (SL) 2 10 2 blue KB   SAQ      Hip stretches on sliders, quad stretch Hip ext on leg press 2 10 40#Chinese squat with slow decel Red KB. 12\" box   Split Squat jumps With Green Resistance into genu valgum. Focus on landing control. Leg Press Ecc Full Range [place] on airex pad   Lateral and posterior   Lateral step down (dips) 6\"   Glute side walks White at knees   Leg press hip ext Slide Lunge with dribble 2 10 Each leg   BOSU squat with shot 1 10    Resisted step up 2 10    Swissball wall rolls- in SLS- hip drive      Quad hip ext/wall-ball rolls      Hip Matrix       Manual Intervention (17266)  Min: 15'      Knee mobs/PROM      Tib/Fem Mobs      Patella Mobs      Ankle mobs      STM/IASTM 1 15 Right quadriceps (VL, RF), patellar tendon     Right Hip      NMR re-education (61971)  Min:   CUES NEEDED   Mongolian/Biofeedback 10/10      BFR      G. Med activation      Hip Ext full ROM/ G.  Activation      Bosu Bal and Prop- G Med      Single leg stance/Balance/Prop      Bosu Retro G. Med act      Prone Hip froggers- sliders/elevated      Lateral hip Slider Therapeutic Activity (70980)  Min:       Ladders      Plyos      Dynamic Balance      Step up with sport cord (blue) Knee drive/layup with slow eccentric lower                   Therapeutic Exercise and NMR EXR  [x] (10351) Provided verbal/tactile cueing for activities related to strengthening, flexibility, endurance, ROM for improvements in LE, proximal hip, and core control with self care, mobility, lifting, ambulation. [x] (83974) Provided verbal/tactile cueing for activities related to improving balance, coordination, kinesthetic sense, posture, motor skill, proprioception  to assist with LE, proximal hip, and core control in self care, mobility, lifting, ambulation and eccentric single leg control.      NMR and Therapeutic Activities:    [x] (89565 or 87186) Provided verbal/tactile cueing for activities related to improving balance, coordination, kinesthetic sense, posture, motor skill, proprioception and motor activation to allow for proper function of core, proximal hip and LE with self care and ADLs and functional mobility.   [] (07440) Gait Re-education- Provided training and instruction to the patient for proper LE, core and proximal hip recruitment and positioning and eccentric body weight control with ambulation re-education including up and down stairs     Home Exercise Program:    [x] (16562) Reviewed/Progressed HEP activities related to strengthening, flexibility, endurance, ROM of core, proximal hip and LE for functional self-care, mobility, lifting and ambulation/stair navigation   [] (06232)Reviewed/Progressed HEP activities related to improving balance, coordination, kinesthetic sense, posture, motor skill, proprioception of core, proximal hip and LE for self care, mobility, lifting, and ambulation/stair navigation      Manual Treatments:  PROM / STM / Oscillations-Mobs:  G-I, II, III, IV (PA's, Inf., Post.)  [x] (14206) Provided manual therapy to mobilize LE, proximal hip and/or LS spine soft tissue/joints for the purpose of modulating pain, promoting relaxation,  increasing ROM, reducing/eliminating soft tissue swelling/inflammation/restriction, improving soft tissue extensibility and allowing for proper ROM for normal function with self care, mobility, lifting and ambulation. Modalities:     [] GAME READY (VASO)- for significant edema, swelling, pain control. Charges:  Timed Code Treatment Minutes: 45   Total Treatment Minutes: 45      [] EVAL (LOW) 70141 (typically 20 minutes face-to-face)  [] EVAL (MOD) 73719 (typically 30 minutes face-to-face)  [] EVAL (HIGH) 57239 (typically 45 minutes face-to-face)  [] RE-EVAL      [x] HERMINIA(44527) x   2  [] DRY NEEDLE 1 OR 2 MUSCLES  [] NMR (42307) x     [] DRY NEEDLE 3+ MUSCLES  [x] Manual (29737) x  1     [] TA (30309) x     [] Mech Traction (76878)  [] ES(attended) (24243)     [] ES (un) (44523):   [] VASO (45640)  [] Other:    If [facility] Please Indicate Time In/Out  CPT Code Time in Time out                                   GOALS:  Patient stated goal: Return to pain free sport activities. [x] Progressing: [] Met: [] Not Met: [] Adjusted    Therapist goals for Patient:   Short Term Goals: To be achieved in: 2 weeks  1. Independent in HEP and progression per patient tolerance, in order to prevent re-injury. [] Progressing: [x] Met: [] Not Met: [] Adjusted  2. Patient will have a decrease in pain to facilitate improvement in movement, function, and ADLs as indicated by Functional Deficits. [] Progressing: [x] Met: [] Not Met: [] Adjusted    Long Term Goals: To be achieved in: 8 weeks  1. Disability index score of 10% or less for the LEFS to assist with reaching prior level of function. [] Progressing: [x] Met: [] Not Met: [] Adjusted  2.  Patient will demonstrate increased AROM for hip extension during functional length assessment Katy Petties Test) allow for proper joint functioning as indicated by patients Functional Deficits. [] Progressing: [x] Met: [] Not Met: [] Adjusted  3. Patient will demonstrate an increase in Strength to good proximal hip strength and control, within 5lb HHD in LE to allow for proper functional mobility as indicated by patients Functional Deficits. [] Progressing: [x] Met: [] Not Met: [] Adjusted  4. Patient will return to Jumping/Landing/Pivoting functional activities without increased symptoms or restriction. [x] Progressing: [] Met: [] Not Met: [] Adjusted       ASSESSMENT:  Patient is making great progress with therapy to right knee and hip. She is heading home for a few weeks of the summer break and will continue with HEP in that time. May require further stretching and strengthening in prep for season upon her return to campus. Return to Play: (if applicable)   []  Stage 1: Intro to Strength   []  Stage 2: Return to Run and Strength   []  Stage 3: Return to Jump and Strength   []  Stage 4: Dynamic Strength and Agility   []  Stage 5: Sport Specific Training     []  Ready to Return to Play, Meets All Above Stages   []  Not Ready for Return to Sports   Comments:            Treatment/Activity Tolerance:  [x] Patient tolerated treatment well [] Patient limited by fatique  [] Patient limited by pain  [] Patient limited by other medical complications  [] Other:     Overall Progression Towards Functional goals/ Treatment Progress Update:  [x] Patient is progressing as expected towards functional goals listed. [] Progression is slowed due to complexities/Impairments listed. [] Progression has been slowed due to co-morbidities.   [] Plan just implemented, too soon to assess goals progression <30days   [] Goals require adjustment due to lack of progress  [] Patient is not progressing as expected and requires additional follow up with physician  [] Other    Prognosis for POC: [x] Good [] Fair  [] Poor    Patient requires continued skilled intervention: [x] Yes  [] No        PLAN: Will follow up as needed in a few weeks. [x] Continue per plan of care [] Alter current plan (see comments)  [] Plan of care initiated [] Hold pending MD visit [] Discharge    Electronically signed by:     Aruna Dunbar PT      Note: If patient does not return for scheduled/recommended follow up visits, this note will serve as a discharge from care along with the most recent update on progress.

## 2022-05-16 DIAGNOSIS — F41.9 ANXIETY: ICD-10-CM

## 2022-07-06 ENCOUNTER — HOSPITAL ENCOUNTER (OUTPATIENT)
Dept: PHYSICAL THERAPY | Age: 22
Setting detail: THERAPIES SERIES
Discharge: HOME | End: 2022-07-06
Payer: COMMERCIAL

## 2022-07-06 PROCEDURE — 97140 MANUAL THERAPY 1/> REGIONS: CPT

## 2022-07-06 PROCEDURE — 97110 THERAPEUTIC EXERCISES: CPT

## 2022-07-06 PROCEDURE — 97032 APPL MODALITY 1+ESTIM EA 15: CPT

## 2022-07-06 PROCEDURE — 20560 NDL INSJ W/O NJX 1 OR 2 MUSC: CPT

## 2022-07-06 NOTE — FLOWSHEET NOTE
[place] [address]  Phone: [phone] Fax: [phone]            Date:  2022    Patient Name:  Soraida Aguillon    :  2000  MRN: [id number]  Restrictions/Precautions:    Medical/Treatment Diagnosis Information:  · Diagnosis: right patellar tendonitis, left quad tendon partial tear  · Treatment Diagnosis: M25.561, G39.836  Insurance/Certification information:  PT Insurance Information: BCBS/[place]  Physician Information:  Referring Practitioner: Nga Ortiz MD  Plan of care signed (Y/N):     Date of Patient follow up with Physician:      Progress Report: [x]  Yes  []  No     Date Range for reporting period:  Beginnin2022  Endin2022    Progress report due (10 Rx/or 30 days whichever is less):      Recertification due (POC duration/ or 90 days whichever is less):      Visit # Insurance Allowable Auth Needed   8 60/MU []Yes   [x]No     Latex Allergy:  [x]NO      []YES  Preferred Language for Healthcare:   [x]English       []other:  Functional Scale: LEFS, 50% Date assessed: 2022    Pain level:  Rest- 0/10     SUBJECTIVE:  Patient reports that her knee is sore and swollen today. Would like to needle. Patient has already been at lift this am and would like to focus on recovery.      OBJECTIVE:    Observation: tender through right patellar tendon and inferior pole of patella, improved quad flexibility   Test measurements:  Right hip ROM WFL in all planes with noted improvement in capsular mobility    RESTRICTIONS/PRECAUTIONS: none    Exercises/Interventions:     Therapeutic Ex (59890)   Min: 10' Sets/sec Reps Notes/CUES   Retro Stepper/BIKE      Alter G      BFR      Resisted walk 1 10    RDL (SL) 2 10 2 blue KB   SAQ      Hip stretches on sliders, quad stretch Hip ext on leg press 2 10 40#British squat with slow decel Red KB. 12\" box   Split Squat jumps With [place] Friday Resistance into genu valgum. Focus on landing control. Leg Press Ecc Full Range [place] on airex pad   Lateral and posterior   Lateral step down (dips) 6\"   Glute side walks White at knees   Leg press hip ext Slide Lunge with dribble 2 10 Each leg   BOSU squat with shot 1 10    Resisted step up 2 10    Swissball wall rolls- in SLS- hip drive      Quad hip ext/wall-ball rolls      Hip Matrix [place] and ITBand stretching 30 3    Manual Intervention (48290)  Min: 35'      Knee mobs/PROM      Tib/Fem Mobs      Patella Mobs      DN 10  R quad and patella tendon   STM/IASTM 1 15 Right quadriceps (VL, RF), patellar tendon     Right Hip mobs 10     NMR re-education (87788)  Min:   CUES NEEDED   Slovenian/Biofeedback 10/10      BFR      G. Med activation      Hip Ext full ROM/ G. Activation      Bosu Bal and Prop- G Med      Single leg stance/Balance/Prop      Bosu Retro G. Med act      Prone Hip froggers- sliders/elevated      Lateral hip Slider Therapeutic Activity (68275)  Min:       Ladders      Plyos      Dynamic Balance      Step up with sport cord (blue) Knee drive/layup with slow eccentric lower                 Spoke with   regarding the use of Dry Needling      Dry needling manual therapy: consisted on the placement of 6 needles in the following muscles: VMO, ITB, inferior patella.  A 40-60 mm needle was inserted, piston, rotated, and coned to produce intramuscular mobilization.  These techniques were used to restore functional range of motion, reduce muscle spasm and induce healing in the corresponding musculature. (69807)  Clean Technique was utilized today while applying Dry needling treatment.  The treatment sites where cleaned with 70% solution of  isopropyl alcohol .  The PT washed their hands and utilized treatment gloves along with hand  prior to inserting the needles.  All needles where removed and discarded in the appropriate sharps container.   MD has given verbal and/or written approval for this treatment.      Attended low frequency (1-20Hz) electrical stimulation was utilized in conjunction with Dry Needling:  the Estim was manipulated between all above needles for a period of 10 min.  at 4-6 volts.  The low frequency electrical stimulation was used to help reduce muscle spasm and help to interrupt /[place] the pain cycle. (84816)        Therapeutic Exercise and NMR EXR  [x] (58819) Provided verbal/tactile cueing for activities related to strengthening, flexibility, endurance, ROM for improvements in LE, proximal hip, and core control with self care, mobility, lifting, ambulation. [x] (32449) Provided verbal/tactile cueing for activities related to improving balance, coordination, kinesthetic sense, posture, motor skill, proprioception  to assist with LE, proximal hip, and core control in self care, mobility, lifting, ambulation and eccentric single leg control.      NMR and Therapeutic Activities:    [x] (03587 or 25829) Provided verbal/tactile cueing for activities related to improving balance, coordination, kinesthetic sense, posture, motor skill, proprioception and motor activation to allow for proper function of core, proximal hip and LE with self care and ADLs and functional mobility.   [] (00899) Gait Re-education- Provided training and instruction to the patient for proper LE, core and proximal hip recruitment and positioning and eccentric body weight control with ambulation re-education including up and down stairs     Home Exercise Program:    [x] (45505) Reviewed/Progressed HEP activities related to strengthening, flexibility, endurance, ROM of core, proximal hip and LE for functional self-care, mobility, lifting and ambulation/stair navigation   [] (35155)Reviewed/Progressed HEP activities related to improving balance, coordination, kinesthetic sense, posture, motor skill, proprioception of core, proximal hip and LE for self care, mobility, lifting, and ambulation/stair navigation      Manual Treatments:  PROM / STM / Oscillations-Mobs:  G-I, II, III, IV (PA's, Inf., Post.)  [x] (72228) Provided manual therapy to mobilize LE, proximal hip and/or LS spine soft tissue/joints for the purpose of modulating pain, promoting relaxation,  increasing ROM, reducing/eliminating soft tissue swelling/inflammation/restriction, improving soft tissue extensibility and allowing for proper ROM for normal function with self care, mobility, lifting and ambulation. Modalities:     [] GAME READY (VASO)- for significant edema, swelling, pain control. Charges:  Timed Code Treatment Minutes: 45   Total Treatment Minutes: 45      [] EVAL (LOW) 27831 (typically 20 minutes face-to-face)  [] EVAL (MOD) 41689 (typically 30 minutes face-to-face)  [] EVAL (HIGH) 85147 (typically 45 minutes face-to-face)  [] RE-EVAL      [x] HZ(37239) x   1  [] DRY NEEDLE 1 OR 2 MUSCLES  [] NMR (04710) x     [] DRY NEEDLE 3+ MUSCLES  [x] Manual (80243) x  2     [] TA (23750) x     [] Mech Traction (80329)  [] ES(attended) (75705)     [] ES (un) (90804):   [] VASO (30976)  [] Other:    If [facility] Please Indicate Time In/Out  CPT Code Time in Time out                                   GOALS:  Patient stated goal: Return to pain free sport activities. [x] Progressing: [] Met: [] Not Met: [] Adjusted    Therapist goals for Patient:   Short Term Goals: To be achieved in: 2 weeks  1. Independent in HEP and progression per patient tolerance, in order to prevent re-injury. [] Progressing: [x] Met: [] Not Met: [] Adjusted  2. Patient will have a decrease in pain to facilitate improvement in movement, function, and ADLs as indicated by Functional Deficits. [] Progressing: [x] Met: [] Not Met: [] Adjusted    Long Term Goals: To be achieved in: 8 weeks  1. Disability index score of 10% or less for the LEFS to assist with reaching prior level of function. [] Progressing: [x] Met: [] Not Met: [] Adjusted  2.  Patient will demonstrate increased AROM for hip extension during functional length assessment Re Ramirez Test) allow for proper joint functioning as indicated by patients Functional Deficits. [] Progressing: [x] Met: [] Not Met: [] Adjusted  3. Patient will demonstrate an increase in Strength to good proximal hip strength and control, within 5lb HHD in LE to allow for proper functional mobility as indicated by patients Functional Deficits. [] Progressing: [x] Met: [] Not Met: [] Adjusted  4. Patient will return to Jumping/Landing/Pivoting functional activities without increased symptoms or restriction. [x] Progressing: [] Met: [] Not Met: [] Adjusted       ASSESSMENT: Good tolerance to manual therapy. Instructed in geovanni and ITband stretching, soft tissue release to hip flexor and self patella mobs. Improved mobility and decreased soreness at conclusion. Return to Play: (if applicable)   []  Stage 1: Intro to Strength   []  Stage 2: Return to Run and Strength   []  Stage 3: Return to Jump and Strength   []  Stage 4: Dynamic Strength and Agility   []  Stage 5: Sport Specific Training     []  Ready to Return to Play, Meets All Above Stages   []  Not Ready for Return to Sports   Comments:            Treatment/Activity Tolerance:  [x] Patient tolerated treatment well [] Patient limited by fatique  [] Patient limited by pain  [] Patient limited by other medical complications  [] Other:     Overall Progression Towards Functional goals/ Treatment Progress Update:  [x] Patient is progressing as expected towards functional goals listed. [] Progression is slowed due to complexities/Impairments listed. [] Progression has been slowed due to co-morbidities.   [] Plan just implemented, too soon to assess goals progression <30days   [] Goals require adjustment due to lack of progress  [] Patient is not progressing as expected and requires additional follow up with physician  [] Other    Prognosis for POC: [x] Good [] Fair  [] Poor    Patient requires continued skilled intervention: [x] Yes  [] No        PLAN: Will follow up as needed in a few weeks. [x] Continue per plan of care [] Alter current plan (see comments)  [] Plan of care initiated [] Hold pending MD visit [] Discharge    Electronically signed by:     Nano [place] PTA      Note: If patient does not return for scheduled/recommended follow up visits, this note will serve as a discharge from care along with the most recent update on progress.

## 2022-07-12 ENCOUNTER — HOSPITAL ENCOUNTER (OUTPATIENT)
Dept: PHYSICAL THERAPY | Age: 22
Setting detail: THERAPIES SERIES
Discharge: HOME | End: 2022-07-12
Payer: COMMERCIAL

## 2022-07-12 PROCEDURE — 97140 MANUAL THERAPY 1/> REGIONS: CPT

## 2022-07-12 PROCEDURE — 97110 THERAPEUTIC EXERCISES: CPT

## 2022-07-12 NOTE — FLOWSHEET NOTE
[facility] [address]  Phone: [phone] Fax: [phone]            Date:  2022    Patient Name:  Aleyda Haney    :  2000  MRN: [id number]  Restrictions/Precautions:    Medical/Treatment Diagnosis Information:  · Diagnosis: right patellar tendonitis, left quad tendon partial tear  · Treatment Diagnosis: M25.561, N88.746  Insurance/Certification information:  PT Insurance Information: BCBS/AG/[place]  Physician Information:  Referring Practitioner: Shirley Masterson MD  Plan of care signed (Y/N):     Date of Patient follow up with Physician:      Progress Report: []  Yes  [x]  No     Date Range for reporting period:  Beginnin2022  Endin2022    Progress report due (10 Rx/or 30 days whichever is less):      Recertification due (POC duration/ or 90 days whichever is less):      Visit # Insurance Allowable Auth Needed   9 60/MU []Yes   [x]No     Latex Allergy:  [x]NO      []YES  Preferred Language for Healthcare:   [x]English       []other:  Functional Scale: LEFS, 50% Date assessed: 2022    Pain level:  Rest- 0/10     SUBJECTIVE:  Patient reports that her knee is feeling less sore today; depends on what she has been doing with sport. Pt notes that he is hesitant to get into deeper angles of squats and lunges since this is what usually causes pain when she is playing and lifting. OBJECTIVE: reviewed inferior self-mob for R patella.    Observation: tender through right patellar tendon and inferior pole of patella, improved quad flexibility   Test measurements:  Right hip ROM WFL in all planes with noted improvement in capsular mobility    RESTRICTIONS/PRECAUTIONS: none    Exercises/Interventions:     Therapeutic Ex (64240)   Min: 15' Sets/sec Reps Notes/CUES   Retro Stepper/BIKE      Alter G      BFR 8 min  Blue cuff, 80%, SLR, SL abd, LAQ, HC, squat, HR   Resisted walk    RDL (SL) 2 10 2 blue KB   Step Ups c SC 2 10 6in box + SC at waist   Hip stretches on sliders, quad stretch Hip ext on leg press 40#Kittitian squat with slow decel Red KB. 12\" box   Split Squat jumps With Green Resistance into genu valgum. Focus on landing control. Leg Press Ecc Full Range 1 15 Out 2, back 1, [place] on airex pad   Lateral and posterior   Lateral step down (dips) 6\"   Glute side walks 15' 2 White at knees        Leg press hip ext Slide Lunge  5sec 10 Each leg   BOSU squat with shot    Resisted step up    Swissball wall rolls- in SLS- hip drive      Quad hip ext/wall-ball rolls      Hip Matrix Randall and ITBand stretching 30 3    Manual Intervention (40975)  Min: 25'      Knee mobs/PROM      Tib/Fem Mobs      Patella Mobs      DN   R quad and patella tendon   STM/IASTM 1 15 Right quadriceps (VL, RF), patellar tendon     Right Hip mobs 10     NMR re-education (45844)  Min:   CUES NEEDED   Japanese/Biofeedback 10/10      BFR      G. Med activation      Hip Ext full ROM/ G. Activation      Bosu Bal and Prop- G Med      Single leg stance/Balance/Prop      Bosu Retro G. Med act      Prone Hip froggers- sliders/elevated      Lateral hip Slider Therapeutic Activity (91781)  Min:       Ladders      Plyos      Dynamic Balance      Step up with sport cord (blue) Knee drive/layup with slow eccentric lower                      Therapeutic Exercise and NMR EXR  [x] (75381) Provided verbal/tactile cueing for activities related to strengthening, flexibility, endurance, ROM for improvements in LE, proximal hip, and core control with self care, mobility, lifting, ambulation. [x] (75569) Provided verbal/tactile cueing for activities related to improving balance, coordination, kinesthetic sense, posture, motor skill, proprioception  to assist with LE, proximal hip, and core control in self care, mobility, lifting, ambulation and eccentric single leg control.      NMR and Therapeutic Activities:    [x] (72076 or 70981) Provided verbal/tactile cueing for activities related to improving balance, coordination, kinesthetic sense, posture, motor skill, proprioception and motor activation to allow for proper function of core, proximal hip and LE with self care and ADLs and functional mobility.   [] (58560) Gait Re-education- Provided training and instruction to the patient for proper LE, core and proximal hip recruitment and positioning and eccentric body weight control with ambulation re-education including up and down stairs     Home Exercise Program:    [x] (91142) Reviewed/Progressed HEP activities related to strengthening, flexibility, endurance, ROM of core, proximal hip and LE for functional self-care, mobility, lifting and ambulation/stair navigation   [] (88583)Reviewed/Progressed HEP activities related to improving balance, coordination, kinesthetic sense, posture, motor skill, proprioception of core, proximal hip and LE for self care, mobility, lifting, and ambulation/stair navigation      Manual Treatments:  PROM / STM / Oscillations-Mobs:  G-I, II, III, IV (PA's, Inf., Post.)  [x] (26890) Provided manual therapy to mobilize LE, proximal hip and/or LS spine soft tissue/joints for the purpose of modulating pain, promoting relaxation,  increasing ROM, reducing/eliminating soft tissue swelling/inflammation/restriction, improving soft tissue extensibility and allowing for proper ROM for normal function with self care, mobility, lifting and ambulation. Modalities:  Self ice-cup for R patella tendon x 6 min at conclusion c knee in 60 deg flexion. [] GAME READY (VASO)- for significant edema, swelling, pain control.      Charges:  Timed Code Treatment Minutes: 40   Total Treatment Minutes: 40      [] EVAL (LOW) 35264 (typically 20 minutes face-to-face)  [] EVAL (MOD) 05308 (typically 30 minutes face-to-face)  [] EVAL (HIGH) 08539 (typically 45 minutes face-to-face)  [] RE-EVAL      [x] MA(56711) x   1  [] DRY NEEDLE 1 OR 2 MUSCLES  [] NMR (54706) x     [] DRY NEEDLE 3+ MUSCLES  [x] Manual (57133) x  2     [] TA (16963) x     [] Mech Traction (58890)  [] ES(attended) (41901)     [] ES (un) (44753):   [] VASO (74478)  [] Other:    If BWC Please Indicate Time In/Out  CPT Code Time in Time out                                   GOALS:  Patient stated goal: Return to pain free sport activities. [x] Progressing: [] Met: [] Not Met: [] Adjusted    Therapist goals for Patient:   Short Term Goals: To be achieved in: 2 weeks  1. Independent in HEP and progression per patient tolerance, in order to prevent re-injury. [] Progressing: [x] Met: [] Not Met: [] Adjusted  2. Patient will have a decrease in pain to facilitate improvement in movement, function, and ADLs as indicated by Functional Deficits. [] Progressing: [x] Met: [] Not Met: [] Adjusted    Long Term Goals: To be achieved in: 8 weeks  1. Disability index score of 10% or less for the LEFS to assist with reaching prior level of function. [] Progressing: [x] Met: [] Not Met: [] Adjusted  2. Patient will demonstrate increased AROM for hip extension during functional length assessment Rachell Sickle Test) allow for proper joint functioning as indicated by patients Functional Deficits. [] Progressing: [x] Met: [] Not Met: [] Adjusted  3. Patient will demonstrate an increase in Strength to good proximal hip strength and control, within 5lb HHD in LE to allow for proper functional mobility as indicated by patients Functional Deficits. [] Progressing: [x] Met: [] Not Met: [] Adjusted  4. Patient will return to Jumping/Landing/Pivoting functional activities without increased symptoms or restriction. [x] Progressing: [] Met: [] Not Met: [] Adjusted       ASSESSMENT:  Good tolerance to manual therapy and posterior chain exercise. Improved mobility and decreased soreness at conclusion. Improved patient technique with inferior patella mob.      Return to Play: (if applicable)   []  Stage 1: Intro to Strength   []  Stage 2: Return to Run and Strength   []  Stage 3: Return to Jump and Strength   []  Stage 4: Dynamic Strength and Agility   []  Stage 5: Sport Specific Training     []  Ready to Return to Play, Meets All Above Stages   []  Not Ready for Return to Sports   Comments:            Treatment/Activity Tolerance:  [x] Patient tolerated treatment well [] Patient limited by fatique  [] Patient limited by pain  [] Patient limited by other medical complications  [] Other:     Overall Progression Towards Functional goals/ Treatment Progress Update:  [x] Patient is progressing as expected towards functional goals listed. [] Progression is slowed due to complexities/Impairments listed. [] Progression has been slowed due to co-morbidities. [] Plan just implemented, too soon to assess goals progression <30days   [] Goals require adjustment due to lack of progress  [] Patient is not progressing as expected and requires additional follow up with physician  [] Other    Prognosis for POC: [x] Good [] Fair  [] Poor    Patient requires continued skilled intervention: [x] Yes  [] No        PLAN: Will follow up as needed in a few weeks. [x] Continue per plan of care [] Alter current plan (see comments)  [] Plan of care initiated [] Hold pending MD visit [] Discharge    Electronically signed by:     Mariola Ferrara PTA      Note: If patient does not return for scheduled/recommended follow up visits, this note will serve as a discharge from care along with the most recent update on progress.

## 2022-07-19 ENCOUNTER — HOSPITAL ENCOUNTER (OUTPATIENT)
Dept: PHYSICAL THERAPY | Age: 22
Setting detail: THERAPIES SERIES
Discharge: HOME | End: 2022-07-19
Payer: COMMERCIAL

## 2022-07-19 NOTE — FLOWSHEET NOTE
[facility]    Physical Therapy  Cancellation/No-show Note  Patient Name:  Maria Isabel Lance  :  2000   Date:  2022  Cancelled visits to date: 0  No-shows to date: 3    For today's appointment patient:  []  Cancelled  []  Rescheduled appointment  [x]  No-show     Reason given by patient:  []  Patient ill  []  Conflicting appointment  []  No transportation    []  Conflict with work  [x]  No reason given  []  Other:     Comments:      Electronically signed by:  Fiordaliza Angulo PTA

## 2022-07-21 ENCOUNTER — HOSPITAL ENCOUNTER (OUTPATIENT)
Dept: PHYSICAL THERAPY | Age: 22
Setting detail: THERAPIES SERIES
Discharge: HOME | End: 2022-07-21
Payer: COMMERCIAL

## 2022-07-21 PROCEDURE — 97140 MANUAL THERAPY 1/> REGIONS: CPT

## 2022-07-21 PROCEDURE — 97110 THERAPEUTIC EXERCISES: CPT

## 2022-07-21 NOTE — FLOWSHEET NOTE
[facility] [address]  Phone: [phone] Fax: [phone]            Date:  2022    Patient Name:  Jared Painting    :  2000  MRN: [id number]  Restrictions/Precautions:    Medical/Treatment Diagnosis Information:  Diagnosis: right patellar tendonitis, left quad tendon partial tear  Treatment Diagnosis: M25.561, K07.722  Insurance/Certification information:  PT Insurance Information: BCBS/[place]  Physician Information:  Referring Practitioner: Alan Rosa MD  Plan of care signed (Y/N):     Date of Patient follow up with Physician:      Progress Report: []  Yes  [x]  No     Date Range for reporting period:  Beginnin2022  Endin2022    Progress report due (10 Rx/or 30 days whichever is less):      Recertification due (POC duration/ or 90 days whichever is less): 302018     Visit # Insurance Allowable Auth Needed   10 60/MU []Yes   [x]No     Latex Allergy:  [x]NO      []YES  Preferred Language for Healthcare:   [x]English       []other:  Functional Scale: LEFS, 50% Date assessed: 2022    Pain level:  Rest- 0/10     SUBJECTIVE:  Patient reports that she had a heavy leg day yesterday. Pt has been having more hip discomfort. She is doing leg press at lifting rather than squatting since she has knee pain with squatting. Home mobilization of patella is going better. OBJECTIVE:   Observation: Pt does not recruit posterior chain well in squatting/lunging positions.   Test measurements:  Right hip ROM WFL in all planes with noted improvement in capsular mobility    RESTRICTIONS/PRECAUTIONS: none    Exercises/Interventions:     Therapeutic Ex (46002)   Min: 35 Sets/sec Reps Notes/CUES   Retro Stepper/BIKE      Alter G      BFR  Blue cuff, 80%, SLR, SL abd, LAQ, HC, squat, HR   Resisted walk    RDL (SL) 2 10 Big ST in hands   Step Ups c SC 2 10 6in box + SC at waist   Hip stretches on sliders, quad stretch Hip ext on leg press 40#   Yi squat with slow decel Red KB. 12\" box   Split Squat jumps With Green Resistance into genu valgum. Focus on landing control. Leg Press Ecc Full Range 1 15 Out 2, back 1, 70lb   [place] on airex pad   Lateral and posterior   Lateral step down (dips) 6\"   Glute side walks 15' 2 White at knees   clamshells 2 5 No band, floating the top leg   Squat to wall 10    Glute med iso's 10 Basketball overhead   EOB alt hip ext c feet off floor 10    Earthquake Front squat  10 Earthquake+2 red kb, wedges under toes- too much anterior loading   Lateral BOSU lunge 10 4in box under foot, cues for posterior chain   Leg press hip ext Slide Lunge 5sec 10 Each leg   BOSU squat with shot    Resisted step up    Swissball wall rolls- in SLS- hip drive      1/2 kneeling hip flexor stretch 30sec 3 bilat   Hip Matrix Randall and ITBand stretching 30 3    Manual Intervention (27488)  Min: 13'      Knee mobs/PROM            Patella Mobs 1 3    DN   R quad and patella tendon   STM/IASTM 1 15 Right quadriceps (VL, RF), patellar tendon     Right Hip mobs 10     NMR re-education (64656)  Min:   CUES NEEDED   Latvian/Biofeedback 10/10      BFR      G. Med activation      Hip Ext full ROM/ G. Activation      Bosu Bal and Prop- G Med      Single leg stance/Balance/Prop      Bosu Retro G. Med act      Prone Hip froggers- sliders/elevated      Lateral hip Slider Therapeutic Activity (41948)  Min:       Ladders      Plyos      Dynamic Balance      Step up with sport cord (blue) Knee drive/layup with slow eccentric lower                    Therapeutic Exercise and NMR EXR  [x] (64393) Provided verbal/tactile cueing for activities related to strengthening, flexibility, endurance, ROM for improvements in LE, proximal hip, and core control with self care, mobility, lifting, ambulation.   [x] (38009) Provided verbal/tactile cueing for activities related to improving balance, coordination, kinesthetic sense, posture, motor skill, proprioception  to assist with LE, proximal hip, and core control in self care, mobility, lifting, ambulation and eccentric single leg control. NMR and Therapeutic Activities:    [x] (41764 or 38888) Provided verbal/tactile cueing for activities related to improving balance, coordination, kinesthetic sense, posture, motor skill, proprioception and motor activation to allow for proper function of core, proximal hip and LE with self care and ADLs and functional mobility.   [] (17240) Gait Re-education- Provided training and instruction to the patient for proper LE, core and proximal hip recruitment and positioning and eccentric body weight control with ambulation re-education including up and down stairs     Home Exercise Program:    [x] (37572) Reviewed/Progressed HEP activities related to strengthening, flexibility, endurance, ROM of core, proximal hip and LE for functional self-care, mobility, lifting and ambulation/stair navigation   [] (98154)Reviewed/Progressed HEP activities related to improving balance, coordination, kinesthetic sense, posture, motor skill, proprioception of core, proximal hip and LE for self care, mobility, lifting, and ambulation/stair navigation      Manual Treatments:  PROM / STM / Oscillations-Mobs:  G-I, II, III, IV (PA's, Inf., Post.)  [x] (44079) Provided manual therapy to mobilize LE, proximal hip and/or LS spine soft tissue/joints for the purpose of modulating pain, promoting relaxation,  increasing ROM, reducing/eliminating soft tissue swelling/inflammation/restriction, improving soft tissue extensibility and allowing for proper ROM for normal function with self care, mobility, lifting and ambulation. Modalities:  ice wrapped on knees to go. [] GAME READY (VASO)- for significant edema, swelling, pain control.      Charges:  Timed Code Treatment Minutes: 48   Total Treatment Minutes: 48      [] EVAL (LOW) 33715 (typically 20 minutes face-to-face)  [] EVAL (MOD) 85446 (typically 30 minutes face-to-face)  [] EVAL (HIGH) 04054 (typically 45 minutes face-to-face)  [] RE-EVAL      [x] KM(37302) x   2  [] DRY NEEDLE 1 OR 2 MUSCLES  [] NMR (43448) x     [] DRY NEEDLE 3+ MUSCLES  [x] Manual (67615) x  1     [] TA (84553) x     [] Mech Traction (64514)  [] ES(attended) (96410)     [] ES (un) (68935):   [] VASO (36209)  [] Other:    If BWC Please Indicate Time In/Out  CPT Code Time in Time out                                   GOALS:  Patient stated goal: Return to pain free sport activities. [x] Progressing: [] Met: [] Not Met: [] Adjusted    Therapist goals for Patient:   Short Term Goals: To be achieved in: 2 weeks  1. Independent in HEP and progression per patient tolerance, in order to prevent re-injury. [] Progressing: [x] Met: [] Not Met: [] Adjusted  2. Patient will have a decrease in pain to facilitate improvement in movement, function, and ADLs as indicated by Functional Deficits. [] Progressing: [x] Met: [] Not Met: [] Adjusted    Long Term Goals: To be achieved in: 8 weeks  1. Disability index score of 10% or less for the LEFS to assist with reaching prior level of function. [] Progressing: [x] Met: [] Not Met: [] Adjusted  2. Patient will demonstrate increased AROM for hip extension during functional length assessment Bevere Nightingale Test) allow for proper joint functioning as indicated by patients Functional Deficits. [] Progressing: [x] Met: [] Not Met: [] Adjusted  3. Patient will demonstrate an increase in Strength to good proximal hip strength and control, within 5lb HHD in LE to allow for proper functional mobility as indicated by patients Functional Deficits. [] Progressing: [x] Met: [] Not Met: [] Adjusted  4. Patient will return to Jumping/Landing/Pivoting functional activities without increased symptoms or restriction.    [x] Progressing: [] Met: [] Not Met: [] Adjusted       ASSESSMENT:  Good tolerance to manual therapy and

## 2022-07-25 ENCOUNTER — HOSPITAL ENCOUNTER (OUTPATIENT)
Dept: PHYSICAL THERAPY | Age: 22
Setting detail: THERAPIES SERIES
Discharge: HOME | End: 2022-07-25
Payer: COMMERCIAL

## 2022-07-25 PROCEDURE — 97140 MANUAL THERAPY 1/> REGIONS: CPT

## 2022-07-25 PROCEDURE — 97110 THERAPEUTIC EXERCISES: CPT

## 2022-07-26 NOTE — FLOWSHEET NOTE
[facility] [address] [facility]  Phone: [phone] Fax: [phone]            Date:  2022    Patient Name:  Tripp Santos    :  2000  MRN: [id number]  Restrictions/Precautions:    Medical/Treatment Diagnosis Information:  Diagnosis: right patellar tendonitis, left quad tendon partial tear  Treatment Diagnosis: M25.561, V63.520  Insurance/Certification information:  PT Insurance Information: BCBS/[place]  Physician Information:  Referring Practitioner: Mel Aly MD  Plan of care signed (Y/N):     Date of Patient follow up with Physician:      Progress Report: []  Yes  [x]  No     Date Range for reporting period:  Beginnin2022  Endin2022    Progress report due (10 Rx/or 30 days whichever is less):      Recertification due (POC duration/ or 90 days whichever is less): 974271     Visit # Insurance Allowable Auth Needed   11 60/MU []Yes   [x]No     Latex Allergy:  [x]NO      []YES  Preferred Language for Healthcare:   [x]English       []other:  Functional Scale: LEFS, 50% Date assessed: 2022    Pain level:  Rest- 0/10     SUBJECTIVE:  Patient states right hip feels tight recently and knee is sore through the front. OBJECTIVE:   Observation:   Test measurements:  right hip tightness through posterior hip, tight with ER    RESTRICTIONS/PRECAUTIONS: none    Exercises/Interventions:     Therapeutic Ex (92972)   Min: 35 Sets/sec Reps Notes/CUES   Retro Stepper/BIKE      Squat with rotation 2 10 2 blue KB   BFR  Blue cuff, 80%, SLR, SL abd, LAQ, HC, squat, HR   Resisted walk    RDL (SL) 2 10 Big ST in hands   Step Ups c SC 2 10 6in box + SC at waist   Hip stretches on sliders, quad stretch Hip ext on leg press 40#   Turkmen squat with slow decel Red KB. 12\" box   Split Squat jumps With Green Resistance into genu valgum. Focus on landing control.    Leg Press Ecc Full Range 1 15 Out 2, back 1, 70lb   [place] on airex pad   Lateral and posterior   Lateral step down (dips) 6\"   Glute side walks 15' 2 White at knees   clamshells 2 5 No band, floating the top leg   Squat to wall 10    Glute med iso's 10 Basketball overhead   EOB alt hip ext c feet off floor 10    Earthquake Front squat  10 Earthquake+2 red kb, wedges under toes- too much anterior loading   Lateral BOSU lunge 10 4in box under foot, cues for posterior chain   Leg press hip ext Slide Lunge 5sec 10 Each leg   BOSU squat with shot    Resisted step up    Swissball wall rolls- in SLS- hip drive      1/2 kneeling hip flexor stretch 30sec 3 bilat   Hip Matrix Randall and ITBand stretching 30 3    Manual Intervention (30149)  Min: 20'      Knee mobs/PROM            Patella Mobs 1 3    DN   R quad and patella tendon   STM/IASTM 1 15 Right quadriceps (VL, RF), patellar tendon     Right Hip mobs 15     NMR re-education (76517)  Min:   CUES NEEDED   Maltese/Biofeedback 10/10      BFR      G. Med activation      Hip Ext full ROM/ G. Activation      Bosu Bal and Prop- G Med      Single leg stance/Balance/Prop      Bosu Retro G. Med act      Prone Hip froggers- sliders/elevated      Lateral hip Slider Therapeutic Activity (83912)  Min:       Ladders      Plyos      Dynamic Balance      Step up with sport cord (blue) Knee drive/layup with slow eccentric lower                    Therapeutic Exercise and NMR EXR  [x] (17369) Provided verbal/tactile cueing for activities related to strengthening, flexibility, endurance, ROM for improvements in LE, proximal hip, and core control with self care, mobility, lifting, ambulation. [x] (14480) Provided verbal/tactile cueing for activities related to improving balance, coordination, kinesthetic sense, posture, motor skill, proprioception  to assist with LE, proximal hip, and core control in self care, mobility, lifting, ambulation and eccentric single leg control.      NMR and Therapeutic Activities:    [x] (81755 or ) Provided verbal/tactile cueing for activities related to improving balance, coordination, kinesthetic sense, posture, motor skill, proprioception and motor activation to allow for proper function of core, proximal hip and LE with self care and ADLs and functional mobility.   [] (15375) Gait Re-education- Provided training and instruction to the patient for proper LE, core and proximal hip recruitment and positioning and eccentric body weight control with ambulation re-education including up and down stairs     Home Exercise Program:    [x] (54044) Reviewed/Progressed HEP activities related to strengthening, flexibility, endurance, ROM of core, proximal hip and LE for functional self-care, mobility, lifting and ambulation/stair navigation   [] (62808)Reviewed/Progressed HEP activities related to improving balance, coordination, kinesthetic sense, posture, motor skill, proprioception of core, proximal hip and LE for self care, mobility, lifting, and ambulation/stair navigation      Manual Treatments:  PROM / STM / Oscillations-Mobs:  G-I, II, III, IV (PA's, Inf., Post.)  [x] (55822) Provided manual therapy to mobilize LE, proximal hip and/or LS spine soft tissue/joints for the purpose of modulating pain, promoting relaxation,  increasing ROM, reducing/eliminating soft tissue swelling/inflammation/restriction, improving soft tissue extensibility and allowing for proper ROM for normal function with self care, mobility, lifting and ambulation. Modalities:  ice wrapped on knees to go. [] GAME READY (VASO)- for significant edema, swelling, pain control.      Charges:  Timed Code Treatment Minutes: 48   Total Treatment Minutes: 48      [] EVAL (LOW) 17501 (typically 20 minutes face-to-face)  [] EVAL (MOD) 87091 (typically 30 minutes face-to-face)  [] EVAL (HIGH) 44811 (typically 45 minutes face-to-face)  [] RE-EVAL      [x] FJ(08025) x   1  [] DRY NEEDLE 1 OR 2 MUSCLES  [] NMR (61246) x     [] DRY NEEDLE 3+ MUSCLES  [x] Manual (59139) x  1     [] TA (37257) x     [] Mech Traction (55395)  [] ES(attended) (25075)     [] ES (un) (87661):   [] VASO (50924)  [] Other:    If BWC Please Indicate Time In/Out  CPT Code Time in Time out                                   GOALS:  Patient stated goal: Return to pain free sport activities. [x] Progressing: [] Met: [] Not Met: [] Adjusted    Therapist goals for Patient:   Short Term Goals: To be achieved in: 2 weeks  1. Independent in HEP and progression per patient tolerance, in order to prevent re-injury. [] Progressing: [x] Met: [] Not Met: [] Adjusted  2. Patient will have a decrease in pain to facilitate improvement in movement, function, and ADLs as indicated by Functional Deficits. [] Progressing: [x] Met: [] Not Met: [] Adjusted    Long Term Goals: To be achieved in: 8 weeks  1. Disability index score of 10% or less for the LEFS to assist with reaching prior level of function. [] Progressing: [x] Met: [] Not Met: [] Adjusted  2. Patient will demonstrate increased AROM for hip extension during functional length assessment Verita Clos Test) allow for proper joint functioning as indicated by patients Functional Deficits. [] Progressing: [x] Met: [] Not Met: [] Adjusted  3. Patient will demonstrate an increase in Strength to good proximal hip strength and control, within 5lb HHD in LE to allow for proper functional mobility as indicated by patients Functional Deficits. [] Progressing: [x] Met: [] Not Met: [] Adjusted  4. Patient will return to Jumping/Landing/Pivoting functional activities without increased symptoms or restriction. [x] Progressing: [] Met: [] Not Met: [] Adjusted       ASSESSMENT:  Patient has some increased tightness through posterior hip and knee.      Return to Play: (if applicable)   []  Stage 1: Intro to Strength   []  Stage 2: Return to Run and Strength   []  Stage 3: Return to Jump and Strength   []  Stage 4: Dynamic Strength and Agility   []  Stage 5: Sport Specific Training     []  Ready to Return to Play, [employer]   []  Not Ready for Return to Sports   Comments:            Treatment/Activity Tolerance:  [x] Patient tolerated treatment well [] Patient limited by fatique  [] Patient limited by pain  [] Patient limited by other medical complications  [] Other:     Overall Progression Towards Functional goals/ Treatment Progress Update:  [x] Patient is progressing as expected towards functional goals listed. [] Progression is slowed due to complexities/Impairments listed. [] Progression has been slowed due to co-morbidities. [] Plan just implemented, too soon to assess goals progression <30days   [] Goals require adjustment due to lack of progress  [] Patient is not progressing as expected and requires additional follow up with physician  [] Other    Prognosis for POC: [x] Good [] Fair  [] Poor    Patient requires continued skilled intervention: [x] Yes  [] No        PLAN: Will follow up as needed in a few weeks. [x] Continue per plan of care [] Alter current plan (see comments)  [] Plan of care initiated [] Hold pending MD visit [] Discharge    Electronically signed by:     Doron Trujillo, PT      Note: If patient does not return for scheduled/recommended follow up visits, this note will serve as a discharge from care along with the most recent update on progress.

## 2022-08-01 ENCOUNTER — APPOINTMENT (OUTPATIENT)
Dept: PHYSICAL THERAPY | Age: 22
End: 2022-08-01
Payer: COMMERCIAL

## 2022-08-03 ENCOUNTER — HOSPITAL ENCOUNTER (OUTPATIENT)
Dept: PHYSICAL THERAPY | Age: 22
Setting detail: THERAPIES SERIES
Discharge: HOME | End: 2022-08-03
Payer: COMMERCIAL

## 2022-08-03 PROCEDURE — 97110 THERAPEUTIC EXERCISES: CPT

## 2022-08-03 PROCEDURE — 97140 MANUAL THERAPY 1/> REGIONS: CPT

## 2022-08-03 NOTE — PLAN OF CARE
[place] [address] [address]  Phone: [phone] Fax: [phone]        Physical Therapy Re-Certification Plan of Care/MD UPDATE      Dear Dr. Trevor Manzo  ,    We had the pleasure of treating the following patient for physical therapy services at [address] [place] [address]. A summary of our findings can be found in the updated assessment below. This includes our plan of care. If you have any questions or concerns regarding these findings, please do not hesitate to contact me at the office phone number checked above.   Thank you for the referral.     Physician Signature:________________________________Date:__________________  By signing above (or electronic signature), therapists plan is approved by physician    Date Range Of Visits: 3/17/2022-8/3/2022  Total Visits to Date: 15  Overall Response to Treatment:   [x]Patient is responding well to treatment and improvement is noted with regards  to goals   [x]Patient should continue to improve in reasonable time if they continue HEP   []Patient has plateaued and is no longer responding to skilled PT intervention    []Patient is getting worse and would benefit from return to referring MD   []Patient unable to adhere to initial POC   []Other:          Date:  8/3/2022    Patient Name:  Alen Rubio    :  2000  MRN: [id number]  Restrictions/Precautions:    Medical/Treatment Diagnosis Information:  Diagnosis: right patellar tendonitis, left quad tendon partial tear  Treatment Diagnosis: M25.561, E05.140  Insurance/Certification information:  PT Insurance Information: BCBS//[place]  Physician Information:  Referring Practitioner: Tim Lara MD  Plan of care signed (Y/N):     Date of Patient follow up with Physician:      Progress Report: []  Yes  [x]  No     Date Range for reporting period:  Beginnin2022  Endin2022    Progress report due (10 Rx/or 30 days whichever is less): 49/99/1179     Recertification due (POC duration/ or 90 days whichever is less): 41/330853     Visit # Insurance Allowable Auth Needed   12 60/MU []Yes   [x]No     Latex Allergy:  [x]NO      []YES  Preferred Language for Healthcare:   [x]English       []other:  Functional Scale: LEFS, 50% Date assessed: 03/17/2022    Pain level:  Rest- 0/10     SUBJECTIVE:  Patient reports since being back into the gym and more intense practice, she is more sore through right knee and hip. She has been working with ATC to manage but feels that she is still having some significant tightness through right hip. OBJECTIVE:   Observation: tender at right patellar and quad tendons  Test measurements:  right hip tightness through posterior hip, tight with ER    RESTRICTIONS/PRECAUTIONS: none    Exercises/Interventions:     Therapeutic Ex (85755)   Min: 15 Sets/sec Reps Notes/CUES   Retro Stepper/BIKE      Squat with rotation 2 10 2 blue KB   BFR  Blue cuff, 80%, SLR, SL abd, LAQ, HC, squat, HR   Resisted walk    RDL (SL) 2 10 Big ST in hands   Step Ups c SC 2 10 6in box + SC at waist   Hip stretches on sliders, quad stretch Hip ext on leg press 40#   Welsh squat with slow decel Red KB. 12\" box   Split Squat jumps With Green Resistance into genu valgum. Focus on landing control.    Leg Press Ecc Full Range 1 15 Out 2, back 1, 70lb   [place] on airex pad   Lateral and posterior   Lateral step down (dips) 6\"   Glute side walks 15' 2 White at knees   clamshells No band, floating the top leg   Squat to wall 10    Glute med iso's 10 Basketball overhead   EOB alt hip ext c feet off floor 10    Earthquake Front squat  10 Earthquake+2 red kb, wedges under toes- too much anterior loading   Lateral BOSU lunge 10 4in box under foot, cues for posterior chain   Leg press hip ext Slide Lunge 5sec 10 Each leg   BOSU squat with shot    Resisted step up    Swissball wall rolls- in SLS- hip drive      1/2 kneeling hip flexor stretch 30sec 3 bilat   Hip Matrix Randall and ITBand stretching 30 3    Manual Intervention (63707)  Min: 30'      Knee mobs/PROM            Patella Mobs 1 3'    DN   R quad and patella tendon   STM/IASTM 1 15' Right quadriceps (VL, RF), patellar tendon     Right Hip mobs 15'     NMR re-education (48264)  Min:   CUES NEEDED   [place]/Biofeedback 10/10      BFR      G. Med activation      Hip Ext full ROM/ G. Activation      Bosu Bal and Prop- G Med      Single leg stance/Balance/Prop      Bosu Retro G. Med act      Prone Hip froggers- sliders/elevated      Lateral hip Slider Therapeutic Activity (96419)  Min:       Ladders      Plyos      Dynamic Balance      Step up with sport cord (blue) Knee drive/layup with slow eccentric lower                    Therapeutic Exercise and NMR EXR  [x] (69218) Provided verbal/tactile cueing for activities related to strengthening, flexibility, endurance, ROM for improvements in LE, proximal hip, and core control with self care, mobility, lifting, ambulation. [x] (04020) Provided verbal/tactile cueing for activities related to improving balance, coordination, kinesthetic sense, posture, motor skill, proprioception  to assist with LE, proximal hip, and core control in self care, mobility, lifting, ambulation and eccentric single leg control.      NMR and Therapeutic Activities:    [x] (85925 or 41589) Provided verbal/tactile cueing for activities related to improving balance, coordination, kinesthetic sense, posture, motor skill, proprioception and motor activation to allow for proper function of core, proximal hip and LE with self care and ADLs and functional mobility.   [] (94920) Gait Re-education- Provided training and instruction to the patient for proper LE, core and proximal hip recruitment and positioning and eccentric body weight control with ambulation re-education including up and down stairs     Home Exercise Program:    [x] (53106) Reviewed/Progressed HEP activities related to strengthening, flexibility, endurance, ROM of core, proximal hip and LE for functional self-care, mobility, lifting and ambulation/stair navigation   [] (46852)Reviewed/Progressed HEP activities related to improving balance, coordination, kinesthetic sense, posture, motor skill, proprioception of core, proximal hip and LE for self care, mobility, lifting, and ambulation/stair navigation      Manual Treatments:  PROM / STM / Oscillations-Mobs:  G-I, II, III, IV (PA's, Inf., Post.)  [x] (28148) Provided manual therapy to mobilize LE, proximal hip and/or LS spine soft tissue/joints for the purpose of modulating pain, promoting relaxation,  increasing ROM, reducing/eliminating soft tissue swelling/inflammation/restriction, improving soft tissue extensibility and allowing for proper ROM for normal function with self care, mobility, lifting and ambulation. Modalities:  ice wrapped on knees to go. [] GAME READY (VASO)- for significant edema, swelling, pain control. Charges:  Timed Code Treatment Minutes: 48   Total Treatment Minutes: 48      [] EVAL (LOW) 64248 (typically 20 minutes face-to-face)  [] EVAL (MOD) 09142 (typically 30 minutes face-to-face)  [] EVAL (HIGH) 32654 (typically 45 minutes face-to-face)  [] RE-EVAL      [x] GI(31141) x   1  [] DRY NEEDLE 1 OR 2 MUSCLES  [] NMR (46116) x     [] DRY NEEDLE 3+ MUSCLES  [x] Manual (33809) x  2     [] TA (97086) x     [] Mech Traction (64360)  [] ES(attended) (32981)     [] ES (un) (38314):   [] VASO (41640)  [] Other:    If BWC Please Indicate Time In/Out  CPT Code Time in Time out                                   GOALS:  Patient stated goal: Return to pain free sport activities. [x] Progressing: [] Met: [] Not Met: [] Adjusted    Therapist goals for Patient:   Short Term Goals: To be achieved in: 2 weeks  1. Independent in HEP and progression per patient tolerance, in order to prevent re-injury.    [] Progressing: [x] Met: [] Not Met: [] Adjusted  2. Patient will have a decrease in pain to facilitate improvement in movement, function, and ADLs as indicated by Functional Deficits. [] Progressing: [x] Met: [] Not Met: [] Adjusted    Long Term Goals: To be achieved in: 8 weeks  1. Disability index score of 10% or less for the LEFS to assist with reaching prior level of function. [] Progressing: [x] Met: [] Not Met: [] Adjusted  2. Patient will demonstrate increased AROM for hip extension during functional length assessment Bettylou Ring Test) allow for proper joint functioning as indicated by patients Functional Deficits. [] Progressing: [x] Met: [] Not Met: [] Adjusted  3. Patient will demonstrate an increase in Strength to good proximal hip strength and control, within 5lb HHD in LE to allow for proper functional mobility as indicated by patients Functional Deficits. [] Progressing: [x] Met: [] Not Met: [] Adjusted  4. Patient will return to Jumping/Landing/Pivoting functional activities without increased symptoms or restriction. [x] Progressing: [] Met: [] Not Met: [] Adjusted       ASSESSMENT:  Patient continues to have some flare ups of right knee patellar and quad tendon as well as right hip tightness with increased volume of basketball. She is doing much better with strength of right quad and glutes.      Return to Play: (if applicable)   []  Stage 1: Intro to Strength   []  Stage 2: Return to Run and Strength   []  Stage 3: Return to Jump and Strength   []  Stage 4: Dynamic Strength and Agility   []  Stage 5: Sport Specific Training     []  Ready to Return to Play, Meets All Above Stages   []  Not Ready for Return to Sports   Comments:            Treatment/Activity Tolerance:  [x] Patient tolerated treatment well [] Patient limited by fatique  [] Patient limited by pain  [] Patient limited by other medical complications  [] Other:     Overall Progression Towards Functional goals/ Treatment Progress Update:  [x] Patient is progressing as expected towards functional goals listed. [] Progression is slowed due to complexities/Impairments listed. [] Progression has been slowed due to co-morbidities. [] Plan just implemented, too soon to assess goals progression <30days   [] Goals require adjustment due to lack of progress  [] Patient is not progressing as expected and requires additional follow up with physician  [] Other    Prognosis for POC: [x] Good [] Fair  [] Poor    Patient requires continued skilled intervention: [x] Yes  [] No        PLAN:    [x] Continue per plan of care [] Alter current plan (see comments)  [] Plan of care initiated [] Hold pending MD visit [] Discharge    Electronically signed by:     Helene Walls PT      Note: If patient does not return for scheduled/recommended follow up visits, this note will serve as a discharge from care along with the most recent update on progress.

## 2022-09-09 ENCOUNTER — HOSPITAL ENCOUNTER (OUTPATIENT)
Dept: PHYSICAL THERAPY | Age: 22
Setting detail: THERAPIES SERIES
Discharge: HOME | End: 2022-09-09
Payer: MEDICAID

## 2022-09-09 PROCEDURE — 97110 THERAPEUTIC EXERCISES: CPT

## 2022-09-09 PROCEDURE — 97140 MANUAL THERAPY 1/> REGIONS: CPT

## 2022-09-09 NOTE — FLOWSHEET NOTE
70lb   [place] on airex pad   Lateral and posterior   Lateral step down (dips) 2 10 6\"   Glute side walks 15' 2 White at knees   clamshells No band, floating the top leg   Squat to wall 10    Glute med iso's 10 Basketball overhead   EOB alt hip ext c feet off floor 10    Earthquake Front squat  10 Earthquake+2 red kb, wedges under toes- too much anterior loading   Lateral BOSU lunge 10 4in box under foot, cues for posterior chain   Leg press hip ext Slide Lunge 5sec 10 Each leg   BOSU squat with shot    Resisted step up    Swissball wall rolls- in SLS- hip drive      1/2 kneeling hip flexor stretch 30sec 3 bilat   Hip Matrix Randall and ITBand stretching 30 3    Manual Intervention (79273)  Min: 30'      Knee mobs/PROM            Patella Mobs 1 3'    DN   R quad and patella tendon   STM/IASTM 1 15' Right quadriceps (VL, RF), patellar tendon     Right Hip mobs 15'     NMR re-education (59160)  Min:   CUES NEEDED   Burmese/Biofeedback 10/10      BFR      G. Med activation      Hip Ext full ROM/ G. Activation      Bosu Bal and Prop- G Med      Single leg stance/Balance/Prop      Bosu Retro G. Med act      Prone Hip froggers- sliders/elevated      Lateral hip Slider Therapeutic Activity (32266)  Min:       Ladders      Plyos      Dynamic Balance      Step up with sport cord (blue) Knee drive/layup with slow eccentric lower                    Therapeutic Exercise and NMR EXR  [x] (95223) Provided verbal/tactile cueing for activities related to strengthening, flexibility, endurance, ROM for improvements in LE, proximal hip, and core control with self care, mobility, lifting, ambulation. [x] (64507) Provided verbal/tactile cueing for activities related to improving balance, coordination, kinesthetic sense, posture, motor skill, proprioception  to assist with LE, proximal hip, and core control in self care, mobility, lifting, ambulation and eccentric single leg control.      NMR and Therapeutic Activities:    [x] (36783 or 52452) Provided verbal/tactile cueing for activities related to improving balance, coordination, kinesthetic sense, posture, motor skill, proprioception and motor activation to allow for proper function of core, proximal hip and LE with self care and ADLs and functional mobility.   [] (53223) Gait Re-education- Provided training and instruction to the patient for proper LE, core and proximal hip recruitment and positioning and eccentric body weight control with ambulation re-education including up and down stairs     Home Exercise Program:    [x] (62525) Reviewed/Progressed HEP activities related to strengthening, flexibility, endurance, ROM of core, proximal hip and LE for functional self-care, mobility, lifting and ambulation/stair navigation   [] (99941)Reviewed/Progressed HEP activities related to improving balance, coordination, kinesthetic sense, posture, motor skill, proprioception of core, proximal hip and LE for self care, mobility, lifting, and ambulation/stair navigation      Manual Treatments:  PROM / STM / Oscillations-Mobs:  G-I, II, III, IV (PA's, Inf., Post.)  [x] (63591) Provided manual therapy to mobilize LE, proximal hip and/or LS spine soft tissue/joints for the purpose of modulating pain, promoting relaxation,  increasing ROM, reducing/eliminating soft tissue swelling/inflammation/restriction, improving soft tissue extensibility and allowing for proper ROM for normal function with self care, mobility, lifting and ambulation. Modalities:  ice wrapped on knees to go. [] GAME READY (VASO)- for significant edema, swelling, pain control.      Charges:  Timed Code Treatment Minutes: 48   Total Treatment Minutes: 48      [] EVAL (LOW) 07001 (typically 20 minutes face-to-face)  [] EVAL (MOD) 34341 (typically 30 minutes face-to-face)  [] EVAL (HIGH) 09580 (typically 45 minutes face-to-face)  [] RE-EVAL      [x] NG(23907) x   1  [] DRY NEEDLE 1 OR 2 MUSCLES  [] NMR (03636) x     [] DRY NEEDLE 3+ MUSCLES  [x] Manual (58575) x  2     [] TA (42360) x     [] [facility] (91178[facility]  [] ES(attended) (77877)     [] ES (un) (87462):   [] VASO (27111)  [] Other:    If BWC Please Indicate Time In/Out  CPT Code Time in Time out                                   GOALS:  Patient stated goal: Return to pain free sport activities. [x] Progressing: [] Met: [] Not Met: [] Adjusted    Therapist goals for Patient:   Short Term Goals: To be achieved in: 2 weeks  1. Independent in HEP and progression per patient tolerance, in order to prevent re-injury. [] Progressing: [x] Met: [] Not Met: [] Adjusted  2. Patient will have a decrease in pain to facilitate improvement in movement, function, and ADLs as indicated by Functional Deficits. [] Progressing: [x] Met: [] Not Met: [] Adjusted    Long Term Goals: To be achieved in: 8 weeks  1. Disability index score of 10% or less for the LEFS to assist with reaching prior level of function. [] Progressing: [x] Met: [] Not Met: [] Adjusted  2. Patient will demonstrate increased AROM for hip extension during functional length assessment Lay Floor Test) allow for proper joint functioning as indicated by patients Functional Deficits. [] Progressing: [x] Met: [] Not Met: [] Adjusted  3. Patient will demonstrate an increase in Strength to good proximal hip strength and control, within 5lb HHD in LE to allow for proper functional mobility as indicated by patients Functional Deficits. [] Progressing: [x] Met: [] Not Met: [] Adjusted  4. Patient will return to Jumping/Landing/Pivoting functional activities without increased symptoms or restriction. [x] Progressing: [] Met: [] Not Met: [] Adjusted       ASSESSMENT:  Patient is having increased pain with increased volume of playing and running. She admits to having good and bad days.      Return to Play: (if applicable)   []  Stage 1: Intro to Strength   []  Stage 2: Return to Run and Strength   []  Stage 3: Return to Jump and Strength   [] Stage 4: Dynamic Strength and Agility   []  Stage 5: Sport Specific Training     []  Ready to Return to Play, Meets All Above Stages   []  Not Ready for Return to Sports   Comments:            Treatment/Activity Tolerance:  [x] Patient tolerated treatment well [] Patient limited by fatique  [] Patient limited by pain  [] Patient limited by other medical complications  [] Other:     Overall Progression Towards Functional goals/ Treatment Progress Update:  [x] Patient is progressing as expected towards functional goals listed. [] Progression is slowed due to complexities/Impairments listed. [] Progression has been slowed due to co-morbidities. [] Plan just implemented, too soon to assess goals progression <30days   [] Goals require adjustment due to lack of progress  [] Patient is not progressing as expected and requires additional follow up with physician  [] Other    Prognosis for POC: [x] Good [] Fair  [] Poor    Patient requires continued skilled intervention: [x] Yes  [] No        PLAN:    [x] Continue per plan of care [] Alter current plan (see comments)  [] Plan of care initiated [] Hold pending MD visit [] Discharge    Electronically signed by:     Pete Goodman PT      Note: If patient does not return for scheduled/recommended follow up visits, this note will serve as a discharge from care along with the most recent update on progress.

## 2022-09-16 ENCOUNTER — HOSPITAL ENCOUNTER (OUTPATIENT)
Dept: PHYSICAL THERAPY | Age: 22
Setting detail: THERAPIES SERIES
Discharge: HOME | End: 2022-09-16
Payer: MEDICAID

## 2022-09-16 PROCEDURE — 97110 THERAPEUTIC EXERCISES: CPT

## 2022-09-16 PROCEDURE — 97140 MANUAL THERAPY 1/> REGIONS: CPT

## 2022-09-16 NOTE — FLOWSHEET NOTE
[place] [address] [facility]  Phone: [phone] Fax: [phone]               Date:  2022    Patient Name:  Diana Bonds    :  2000  MRN: [id number]  Restrictions/Precautions:    Medical/Treatment Diagnosis Information:  Diagnosis: right patellar tendonitis, left quad tendon partial tear  Treatment Diagnosis: M25.561, Q20.754  Insurance/Certification information:  PT Insurance Information: BCBS/AG/[place]  Physician Information:  Referring Practitioner: Gill Denver, MD  Plan of care signed (Y/N):     Date of Patient follow up with Physician:      Progress Report: []  Yes  [x]  No     Date Range for reporting period:  Beginnin2022  Endin2022    Progress report due (10 Rx/or 30 days whichever is less):      Recertification due (POC duration/ or 90 days whichever is less): 366706     Visit # Insurance Allowable Auth Needed   14 60/MU []Yes   [x]No     Latex Allergy:  [x]NO      []YES  Preferred Language for Healthcare:   [x]English       []other:  Functional Scale: LEFS, 50% Date assessed: 2022    Pain level:  Rest- 0/10     SUBJECTIVE:  Patient reports right knee felt really good after BFR this week and the plan to start anti-inflammatory when the season starts. OBJECTIVE:   Observation: tender at right patellar and quad tendons, some effusion noted  Test measurements:    RESTRICTIONS/PRECAUTIONS: none    Exercises/Interventions:     Therapeutic Ex (57998)   Min: 15 Sets/sec Reps Notes/CUES   Retro Stepper/BIKE      Squat with rotation 2 blue KB   BFR  Blue cuff, 80%, SLR, SL abd, LAQ, HC, squat, HR   Resisted walk 1 10    RDL (SL) 2 10 Back foot on wall   Step Ups c SC 6in box + SC at waist   Hip stretches on sliders, quad stretch 5' Hip ext on leg press 40#   Icelandic squat with slow decel Red KB. 12\" box   Split Squat jumps With Green Resistance into genu valgum.  Focus on landing control. Leg Press Ecc Full Range Out 2, back 1, 70lb   [place] on airex pad   Lateral and posterior   Lateral step down (dips) 6\"   Glute side walks White at knees   clamshells No band, floating the top leg   Squat to wall 10    Glute med iso's 10 Basketball overhead   EOB alt hip ext c feet off floor 10    Earthquake Front squat  10 Earthquake+2 red kb, wedges under toes- too much anterior loading   Lateral BOSU lunge 10 4in box under foot, cues for posterior chain   Leg press hip ext Slide Lunge 5sec 10 Each leg   BOSU squat with shot    Resisted step up    Swissball wall rolls- in SLS- hip drive      1/2 kneeling hip flexor stretch 30sec 3 bilat   Hip Matrix Randall and ITBand stretching 30 3    Manual Intervention (95028)  Min: 25'      Knee mobs/PROM            Patella Mobs      DN   R quad and patella tendon   STM/IASTM 1 10 Right quadriceps (VL, RF), patellar tendon     Right Hip mobs 15'     NMR re-education (30620)  Min:   CUES NEEDED   [place]an/Biofeedback 10/10      BFR      G. Med activation      Hip Ext full ROM/ G. Activation      Bosu Bal and Prop- G Med      Single leg stance/Balance/Prop      Bosu Retro G. Med act      Prone Hip froggers- sliders/elevated      Lateral hip Slider Therapeutic Activity (74175)  Min:       Ladders      Plyos      Dynamic Balance      Step up with sport cord (blue) Knee drive/layup with slow eccentric lower                    Therapeutic Exercise and NMR EXR  [x] (16208) Provided verbal/tactile cueing for activities related to strengthening, flexibility, endurance, ROM for improvements in LE, proximal hip, and core control with self care, mobility, lifting, ambulation. [x] (48425) Provided verbal/tactile cueing for activities related to improving balance, coordination, kinesthetic sense, posture, motor skill, proprioception  to assist with LE, proximal hip, and core control in self care, mobility, lifting, ambulation and eccentric single leg control.      NMR and Therapeutic Activities:    [x] (36442 or 33376) Provided verbal/tactile cueing for activities related to improving balance, coordination, kinesthetic sense, posture, motor skill, proprioception and motor activation to allow for proper function of core, proximal hip and LE with self care and ADLs and functional mobility.   [] (34358) Gait Re-education- Provided training and instruction to the patient for proper LE, core and proximal hip recruitment and positioning and eccentric body weight control with ambulation re-education including up and down stairs     Home Exercise Program:    [x] (41904) Reviewed/Progressed HEP activities related to strengthening, flexibility, endurance, ROM of core, proximal hip and LE for functional self-care, mobility, lifting and ambulation/stair navigation   [] (88002)Reviewed/Progressed HEP activities related to improving balance, coordination, kinesthetic sense, posture, motor skill, proprioception of core, proximal hip and LE for self care, mobility, lifting, and ambulation/stair navigation      Manual Treatments:  PROM / STM / Oscillations-Mobs:  G-I, II, III, IV (PA's, Inf., Post.)  [x] (32155) Provided manual therapy to mobilize LE, proximal hip and/or LS spine soft tissue/joints for the purpose of modulating pain, promoting relaxation,  increasing ROM, reducing/eliminating soft tissue swelling/inflammation/restriction, improving soft tissue extensibility and allowing for proper ROM for normal function with self care, mobility, lifting and ambulation. Modalities:  ice wrapped on knees to go. [] GAME READY (VASO)- for significant edema, swelling, pain control.      Charges:  Timed Code Treatment Minutes: 43   Total Treatment Minutes: 43      [] EVAL (LOW) 04027 (typically 20 minutes face-to-face)  [] EVAL (MOD) 30574 (typically 30 minutes face-to-face)  [] EVAL (HIGH) 59327 (typically 45 minutes face-to-face)  [] RE-EVAL      [x] IT(87924) x   1  [] DRY NEEDLE 1 OR 2 MUSCLES  [] NMR (87156) x     [] DRY NEEDLE 3+ MUSCLES  [x] Manual (68533) x  1    [] TA (60774) x     [] Mech Traction (44216)  [] ES(attended) (38212)     [] ES (un) (89869):   [] VASO (33578)  [] Other:    If BWC Please Indicate Time In/Out  CPT Code Time in Time out                                   GOALS:  Patient stated goal: Return to pain free sport activities. [x] Progressing: [] Met: [] Not Met: [] Adjusted    Therapist goals for Patient:   Short Term Goals: To be achieved in: 2 weeks  1. Independent in HEP and progression per patient tolerance, in order to prevent re-injury. [] Progressing: [x] Met: [] Not Met: [] Adjusted  2. Patient will have a decrease in pain to facilitate improvement in movement, function, and ADLs as indicated by Functional Deficits. [] Progressing: [x] Met: [] Not Met: [] Adjusted    Long Term Goals: To be achieved in: 8 weeks  1. Disability index score of 10% or less for the LEFS to assist with reaching prior level of function. [] Progressing: [x] Met: [] Not Met: [] Adjusted  2. Patient will demonstrate increased AROM for hip extension during functional length assessment Allan Memos Test) allow for proper joint functioning as indicated by patients Functional Deficits. [] Progressing: [x] Met: [] Not Met: [] Adjusted  3. Patient will demonstrate an increase in Strength to good proximal hip strength and control, within 5lb HHD in LE to allow for proper functional mobility as indicated by patients Functional Deficits. [] Progressing: [x] Met: [] Not Met: [] Adjusted  4. Patient will return to Jumping/Landing/Pivoting functional activities without increased symptoms or restriction. [x] Progressing: [] Met: [] Not Met: [] Adjusted       ASSESSMENT:  Patient felt good after treatment. She has some continued knee discomfort with palpation but overall doing great!     Return to Play: (if applicable)   []  Stage 1: Intro to Strength   []  Stage 2: Return to Run and Strength   [] Stage 3: Return to Jump and Strength   []  Stage 4: Dynamic Strength and Agility   []  Stage 5: Sport Specific Training     []  Ready to Return to Play, Meets All Above Stages   []  Not Ready for Return to Sports   Comments:            Treatment/Activity Tolerance:  [x] Patient tolerated treatment well [] Patient limited by fatique  [] Patient limited by pain  [] Patient limited by other medical complications  [] Other:     Overall Progression Towards Functional goals/ Treatment Progress Update:  [x] Patient is progressing as expected towards functional goals listed. [] Progression is slowed due to complexities/Impairments listed. [] Progression has been slowed due to co-morbidities. [] Plan just implemented, too soon to assess goals progression <30days   [] Goals require adjustment due to lack of progress  [] Patient is not progressing as expected and requires additional follow up with physician  [] Other    Prognosis for POC: [x] Good [] Fair  [] Poor    Patient requires continued skilled intervention: [x] Yes  [] No        PLAN:    [x] Continue per plan of care [] Alter current plan (see comments)  [] Plan of care initiated [] Hold pending MD visit [] Discharge    Electronically signed by:     Kyara Carlson PT      Note: If patient does not return for scheduled/recommended follow up visits, this note will serve as a discharge from care along with the most recent update on progress.

## 2022-09-22 ENCOUNTER — HOSPITAL ENCOUNTER (OUTPATIENT)
Dept: PHYSICAL THERAPY | Age: 22
Setting detail: THERAPIES SERIES
Discharge: HOME | End: 2022-09-22
Payer: MEDICAID

## 2022-09-22 PROCEDURE — 97110 THERAPEUTIC EXERCISES: CPT

## 2022-09-22 PROCEDURE — 97140 MANUAL THERAPY 1/> REGIONS: CPT

## 2022-09-22 NOTE — FLOWSHEET NOTE
[address] [address] [address]  Phone: [phone] Fax: [phone]               Date:  2022    Patient Name:  Marv Carranza    :  2000  MRN: [id number]  Restrictions/Precautions:    Medical/Treatment Diagnosis Information:  Diagnosis: right patellar tendonitis, left quad tendon partial tear  Treatment Diagnosis: M25.561, L17.110  Insurance/Certification information:  PT Insurance Information: BCBS/AG/[place]  Physician Information:  Referring Practitioner: Melanie Pfeiffer MD  Plan of care signed (Y/N):     Date of Patient follow up with Physician:      Progress Report: []  Yes  [x]  No     Date Range for reporting period:  Beginnin2022  Endin2022    Progress report due (10 Rx/or 30 days whichever is less):      Recertification due (POC duration/ or 90 days whichever is less): 158498     Visit # Insurance Allowable Auth Needed   15 60/MU []Yes   [x]No     Latex Allergy:  [x]NO      []YES  Preferred Language for Healthcare:   [x]English       []other:  Functional Scale: LEFS, 50% Date assessed: 2022    Pain level:  Rest- 0/10     SUBJECTIVE:  Patient states right knee is feeling much better today, felt good at practice, which she attributes to taking yesterday off. OBJECTIVE:   Observation: tender through right lateral patellar tendon  Test measurements:    RESTRICTIONS/PRECAUTIONS: none    Exercises/Interventions:     Therapeutic Ex (42779)   Min: 20 Sets/sec Reps Notes/CUES   Retro Stepper/BIKE      Squat with rotation 2 blue KB   BFR  Blue cuff, 80%, SLR, SL abd, LAQ, HC, squat, HR   Resisted walk    RDL (SL) Back foot on wall   Step Ups c SC 6in box + SC at waist   Hip stretches on sliders, quad stretch 5' Hip ext on leg press 40#   Bangladeshi squat with slow decel Red KB. 12\" box   Split Squat jumps With Green Resistance into genu valgum. Focus on landing control.    Leg Press Ecc Full Range 1 15 210# DL, 130SL to fail   Ecc lower to chair with both legs to stand 2 10    Lateral step down (dips) 2 10 6\"   Glute side walks White at knees   clamshells No band, floating the top leg   Squat to wall 10    Glute med iso's 10 Basketball overhead   EOB alt hip ext c feet off floor 10    Earthquake Front squat  10 Earthquake+2 red kb, wedges under toes- too much anterior loading   Lateral BOSU lunge 10 4in box under foot, cues for posterior chain   Leg press hip ext Slide Lunge 5sec 10 Each leg   BOSU squat with shot No weight   Resisted step up    Front load squat with med knee pull, soleus raise 2 10    1/2 kneeling hip flexor stretch 30sec 3 bilat   Hip Matrix Randall and ITBand stretching 30 3    Manual Intervention (41786)  Min: 15'      Knee mobs/PROM            Patella Mobs      DN   R quad and patella tendon   STM/IASTM 1 10 Right quadriceps (VL, RF), patellar tendon   IASTM 1 5 Right Patellar tendon      Right Hip mobs      NMR re-education (49047)  Min:   CUES NEEDED   Guinean/Biofeedback 10/10      BFR      G. Med activation      Hip Ext full ROM/ G. Activation      Bosu Bal and Prop- G Med      Single leg stance/Balance/Prop      Bosu Retro G. Med act      Prone Hip froggers- sliders/elevated      Lateral hip Slider Therapeutic Activity (51534)  Min:       Ladders      Plyos      Dynamic Balance      Step up with sport cord (blue) Knee drive/layup with slow eccentric lower                    Therapeutic Exercise and NMR EXR  [x] (39692) Provided verbal/tactile cueing for activities related to strengthening, flexibility, endurance, ROM for improvements in LE, proximal hip, and core control with self care, mobility, lifting, ambulation.   [x] (81744) Provided verbal/tactile cueing for activities related to improving balance, coordination, kinesthetic sense, posture, motor skill, proprioception  to assist with LE, proximal hip, and core control in self care, mobility, lifting, ambulation and eccentric single leg control. NMR and Therapeutic Activities:    [x] (18476 or 51809) Provided verbal/tactile cueing for activities related to improving balance, coordination, kinesthetic sense, posture, motor skill, proprioception and motor activation to allow for proper function of core, proximal hip and LE with self care and ADLs and functional mobility.   [] (65662) Gait Re-education- Provided training and instruction to the patient for proper LE, core and proximal hip recruitment and positioning and eccentric body weight control with ambulation re-education including up and down stairs     Home Exercise Program:    [x] (44509) Reviewed/Progressed HEP activities related to strengthening, flexibility, endurance, ROM of core, proximal hip and LE for functional self-care, mobility, lifting and ambulation/stair navigation   [] (35027)Reviewed/Progressed HEP activities related to improving balance, coordination, kinesthetic sense, posture, motor skill, proprioception of core, proximal hip and LE for self care, mobility, lifting, and ambulation/stair navigation      Manual Treatments:  PROM / STM / Oscillations-Mobs:  G-I, II, III, IV (PA's, Inf., Post.)  [x] (07697) Provided manual therapy to mobilize LE, proximal hip and/or LS spine soft tissue/joints for the purpose of modulating pain, promoting relaxation,  increasing ROM, reducing/eliminating soft tissue swelling/inflammation/restriction, improving soft tissue extensibility and allowing for proper ROM for normal function with self care, mobility, lifting and ambulation. Modalities:  ice wrapped on knees to go. [] GAME READY (VASO)- for significant edema, swelling, pain control.      Charges:  Timed Code Treatment Minutes: 35   Total Treatment Minutes: 35      [] EVAL (LOW) 52197 (typically 20 minutes face-to-face)  [] EVAL (MOD) 26747 (typically 30 minutes face-to-face)  [] EVAL (HIGH) 63052 (typically 45 minutes face-to-face)  [] RE-EVAL      [x] FJ(17105) x   1  [] DRY NEEDLE 1 OR 2 MUSCLES  [] NMR (33920) x     [] DRY NEEDLE 3+ MUSCLES  [x] Manual (64023) x  1    [] TA (69190) x     [] Mech Traction (20868)  [] ES(attended) (53424)     [] ES (un) (63841):   [] VASO (40535)  [] Other:    If BWC Please Indicate Time In/Out  CPT Code Time in Time out                                   GOALS:  Patient stated goal: Return to pain free sport activities. [x] Progressing: [] Met: [] Not Met: [] Adjusted    Therapist goals for Patient:   Short Term Goals: To be achieved in: 2 weeks  1. Independent in HEP and progression per patient tolerance, in order to prevent re-injury. [] Progressing: [x] Met: [] Not Met: [] Adjusted  2. Patient will have a decrease in pain to facilitate improvement in movement, function, and ADLs as indicated by Functional Deficits. [] Progressing: [x] Met: [] Not Met: [] Adjusted    Long Term Goals: To be achieved in: 8 weeks  1. Disability index score of 10% or less for the LEFS to assist with reaching prior level of function. [] Progressing: [x] Met: [] Not Met: [] Adjusted  2. Patient will demonstrate increased AROM for hip extension during functional length assessment Jerilee Marrero Test) allow for proper joint functioning as indicated by patients Functional Deficits. [] Progressing: [x] Met: [] Not Met: [] Adjusted  3. Patient will demonstrate an increase in Strength to good proximal hip strength and control, within 5lb HHD in LE to allow for proper functional mobility as indicated by patients Functional Deficits. [] Progressing: [x] Met: [] Not Met: [] Adjusted  4. Patient will return to Jumping/Landing/Pivoting functional activities without increased symptoms or restriction. [x] Progressing: [] Met: [] Not Met: [] Adjusted       ASSESSMENT:  Patient felt good after treatment. She has some continued knee discomfort with palpation but overall doing great!     Return to Play: (if applicable)   []  Stage 1: Intro to Strength   []  Stage 2: Return to Run and Strength   []  Stage 3: Return to Jump and Strength   []  Stage 4: Dynamic Strength and Agility   []  Stage 5: Sport Specific Training     []  Ready to Return to Play, Meets All Above Stages   []  Not Ready for Return to Sports   Comments:            Treatment/Activity Tolerance:  [x] Patient tolerated treatment well [] Patient limited by fatique  [] Patient limited by pain  [] Patient limited by other medical complications  [] Other:     Overall Progression Towards Functional goals/ Treatment Progress Update:  [x] Patient is progressing as expected towards functional goals listed. [] Progression is slowed due to complexities/Impairments listed. [] Progression has been slowed due to co-morbidities. [] Plan just implemented, too soon to assess goals progression <30days   [] Goals require adjustment due to lack of progress  [] Patient is not progressing as expected and requires additional follow up with physician  [] Other    Prognosis for POC: [x] Good [] Fair  [] Poor    Patient requires continued skilled intervention: [x] Yes  [] No        PLAN:    [x] Continue per plan of care [] Alter current plan (see comments)  [] Plan of care initiated [] Hold pending MD visit [] Discharge    Electronically signed by:     Michael Goldsmith PT      Note: If patient does not return for scheduled/recommended follow up visits, this note will serve as a discharge from care along with the most recent update on progress.

## 2022-09-30 ENCOUNTER — HOSPITAL ENCOUNTER (OUTPATIENT)
Dept: PHYSICAL THERAPY | Age: 22
Setting detail: THERAPIES SERIES
Discharge: HOME | End: 2022-09-30
Payer: MEDICAID

## 2022-09-30 PROCEDURE — 97110 THERAPEUTIC EXERCISES: CPT

## 2022-09-30 PROCEDURE — 97140 MANUAL THERAPY 1/> REGIONS: CPT

## 2022-09-30 NOTE — FLOWSHEET NOTE
[facility] [address] [address]  Phone: [phone] Fax: [phone]               Date:  2022    Patient Name:  Chun Alva    :  2000  MRN: [id number]  Restrictions/Precautions:    Medical/Treatment Diagnosis Information:  Diagnosis: right patellar tendonitis, left quad tendon partial tear  Treatment Diagnosis: M25.561, Z79.325  Insurance/Certification information:  PT Insurance Information: BCBS/AG/[place]  Physician Information:  Referring Practitioner: Kamilla Castañeda MD  Plan of care signed (Y/N):     Date of Patient follow up with Physician:      Progress Report: []  Yes  [x]  No     Date Range for reporting period:  Beginnin2022  Endin2022    Progress report due (10 Rx/or 30 days whichever is less):      Recertification due (POC duration/ or 90 days whichever is less):      Visit # Insurance Allowable Auth Needed   16 60/MU []Yes   [x]No     Latex Allergy:  [x]NO      []YES  Preferred Language for Healthcare:   [x]English       []other:  Functional Scale: LEFS, 50% Date assessed: 2022    Pain level:  Rest- 0/10     SUBJECTIVE:  Patient reports continued work in training room with BFR which she feels is helping. OBJECTIVE:   Observation: tender through right lateral patellar tendon  Test measurements:    RESTRICTIONS/PRECAUTIONS: none    Exercises/Interventions:     Therapeutic Ex (73239)   Min: 20 Sets/sec Reps Notes/CUES   Retro Stepper/BIKE      Squat with rotation 2 blue KB   BFR  Blue cuff, 80%, SLR, SL abd, LAQ, HC, squat, HR   Resisted walk 1 10    RDL (SL) Back foot on wall   Step Ups c SC 6in box + SC at waist   Hip stretches on sliders, quad stretch 5' Hip ext on leg press 40#   Malawian squat with slow decel 2 10ea Red KB. 12\" box   Split Squat jumps 2 10ea With Green Resistance into genu valgum. Focus on landing control.    Leg Press Ecc Full Range 1 15 210# DL, 130SL to fail   Ecc lower to chair with both legs to stand 2 10    Lateral step down (dips) 2 10 6\"   Glute side walks White at knees   clamshells No band, floating the top leg   Squat to wall 10    Glute med iso's 10 Basketball overhead   EOB alt hip ext c feet off floor 10    Earthquake Front squat  10 Earthquake+2 red kb, wedges under toes- too much anterior loading   Lateral BOSU lunge 10 4in box under foot, cues for posterior chain   Leg press hip ext Slide Lunge 5sec 10 Each leg   BOSU squat with shot No weight   Resisted step up    Front load squat with med knee pull, soleus raise 2 10    1/2 kneeling hip flexor stretch 30sec 3 bilat   Hip Matrix Randall and ITBand stretching 30 3    Manual Intervention (26645)  Min: 15'      Knee mobs/PROM            Patella Mobs      DN   R quad and patella tendon   STM/IASTM 1 10 Right quadriceps (VL, RF), patellar tendon   IASTM 1 5 Right Patellar tendon      Right Hip mobs      NMR re-education (54218)  Min:   CUES NEEDED   Danish/Biofeedback 10/10      BFR      G. Med activation      Hip Ext full ROM/ G. Activation      Bosu Bal and Prop- G Med      Single leg stance/Balance/Prop      Bosu Retro G. Med act      Prone Hip froggers- sliders/elevated      Lateral hip Slider Therapeutic Activity (16199)  Min:       Ladders      Plyos      Dynamic Balance      Step up with sport cord (blue) Knee drive/layup with slow eccentric lower                    Therapeutic Exercise and NMR EXR  [x] (97024) Provided verbal/tactile cueing for activities related to strengthening, flexibility, endurance, ROM for improvements in LE, proximal hip, and core control with self care, mobility, lifting, ambulation.   [x] (69218) Provided verbal/tactile cueing for activities related to improving balance, coordination, kinesthetic sense, posture, motor skill, proprioception  to assist with LE, proximal hip, and core control in self care, mobility, lifting, ambulation and eccentric single leg control. NMR and Therapeutic Activities:    [x] (80233 or 17753) Provided verbal/tactile cueing for activities related to improving balance, coordination, kinesthetic sense, posture, motor skill, proprioception and motor activation to allow for proper function of core, proximal hip and LE with self care and ADLs and functional mobility.   [] (72259) Gait Re-education- Provided training and instruction to the patient for proper LE, core and proximal hip recruitment and positioning and eccentric body weight control with ambulation re-education including up and down stairs     Home Exercise Program:    [x] (41040) Reviewed/Progressed HEP activities related to strengthening, flexibility, endurance, ROM of core, proximal hip and LE for functional self-care, mobility, lifting and ambulation/stair navigation   [] (79646)Reviewed/Progressed HEP activities related to improving balance, coordination, kinesthetic sense, posture, motor skill, proprioception of core, proximal hip and LE for self care, mobility, lifting, and ambulation/stair navigation      Manual Treatments:  PROM / STM / Oscillations-Mobs:  G-I, II, III, IV (PA's, Inf., Post.)  [x] (04077) Provided manual therapy to mobilize LE, proximal hip and/or LS spine soft tissue/joints for the purpose of modulating pain, promoting relaxation,  increasing ROM, reducing/eliminating soft tissue swelling/inflammation/restriction, improving soft tissue extensibility and allowing for proper ROM for normal function with self care, mobility, lifting and ambulation. Modalities:  ice wrapped on knees to go. [] GAME READY (VASO)- for significant edema, swelling, pain control.      Charges:  Timed Code Treatment Minutes: 35   Total Treatment Minutes: 35      [] EVAL (LOW) 06977 (typically 20 minutes face-to-face)  [] EVAL (MOD) 57069 (typically 30 minutes face-to-face)  [] EVAL (HIGH) 34210 (typically 45 minutes face-to-face)  [] RE-EVAL      [x] LP(90634) x   1  [] DRY NEEDLE 1 OR 2 MUSCLES  [] NMR (08864) x     [] DRY NEEDLE 3+ MUSCLES  [x] Manual (64692) x  1    [] TA (45013) x     [] Mech Traction (20849)  [] ES(attended) (22075)     [] ES (un) (38646):   [] VASO (87266)  [] Other:    If BWC Please Indicate Time In/Out  CPT Code Time in Time out                                   GOALS:  Patient stated goal: Return to pain free sport activities. [x] Progressing: [] Met: [] Not Met: [] Adjusted    Therapist goals for Patient:   Short Term Goals: To be achieved in: 2 weeks  1. Independent in HEP and progression per patient tolerance, in order to prevent re-injury. [] Progressing: [x] Met: [] Not Met: [] Adjusted  2. Patient will have a decrease in pain to facilitate improvement in movement, function, and ADLs as indicated by Functional Deficits. [] Progressing: [x] Met: [] Not Met: [] Adjusted    Long Term Goals: To be achieved in: 8 weeks  1. Disability index score of 10% or less for the LEFS to assist with reaching prior level of function. [] Progressing: [x] Met: [] Not Met: [] Adjusted  2. Patient will demonstrate increased AROM for hip extension during functional length assessment Rico Hue Test) allow for proper joint functioning as indicated by patients Functional Deficits. [] Progressing: [x] Met: [] Not Met: [] Adjusted  3. Patient will demonstrate an increase in Strength to good proximal hip strength and control, within 5lb HHD in LE to allow for proper functional mobility as indicated by patients Functional Deficits. [] Progressing: [x] Met: [] Not Met: [] Adjusted  4. Patient will return to Jumping/Landing/Pivoting functional activities without increased symptoms or restriction. [x] Progressing: [] Met: [] Not Met: [] Adjusted       ASSESSMENT:  Patient felt good after treatment. She has some continued knee discomfort with palpation but overall doing great!     Return to Play: (if applicable)   []  Stage 1: Intro to Strength   []  Stage 2: Return to Run and

## 2022-10-07 ENCOUNTER — HOSPITAL ENCOUNTER (OUTPATIENT)
Dept: PHYSICAL THERAPY | Age: 22
Setting detail: THERAPIES SERIES
Discharge: HOME | End: 2022-10-07
Payer: COMMERCIAL

## 2022-10-07 PROCEDURE — 97110 THERAPEUTIC EXERCISES: CPT

## 2022-10-07 PROCEDURE — 97140 MANUAL THERAPY 1/> REGIONS: CPT

## 2022-10-07 NOTE — FLOWSHEET NOTE
[place] [address] [facility]  Phone: [phone] Fax: [phone]               Date:  10/7/2022    Patient Name:  Jad Phipps    :  2000  MRN: [id number]  Restrictions/Precautions:    Medical/Treatment Diagnosis Information:  Diagnosis: right patellar tendonitis, left quad tendon partial tear  Treatment Diagnosis: M25.561, Y66.255  Insurance/Certification information:  PT Insurance Information: BCBS/AG/[place]  Physician Information:  Referring Practitioner: Angela Booth MD  Plan of care signed (Y/N):     Date of Patient follow up with Physician:      Progress Report: []  Yes  [x]  No     Date Range for reporting period:  Beginnin2022  Endin2022    Progress report due (10 Rx/or 30 days whichever is less):      Recertification due (POC duration/ or 90 days whichever is less): 106978     Visit # Insurance Allowable Auth Needed   17 60/MU []Yes   [x]No     Latex Allergy:  [x]NO      []YES  Preferred Language for Healthcare:   [x]English       []other:  Functional Scale: LEFS, 50% Date assessed: 2022    Pain level:  Rest- 0/10     SUBJECTIVE:  Patient states right knee is feeling ok. Making progress with strength. OBJECTIVE:   Observation: tender through right lateral patellar tendon  Test measurements:    RESTRICTIONS/PRECAUTIONS: none    Exercises/Interventions:     Therapeutic Ex (18481)   Min: 20 Sets/sec Reps Notes/CUES   Retro Stepper/BIKE      Squat with rotation 2 blue KB   BFR  Blue cuff, 80%, SLR, SL abd, LAQ, HC, squat, HR   Resisted walk 1 10    RDL (SL) Back foot on wall   Step Ups c SC 6in box + SC at waist   Hip stretches on sliders, quad stretch 5' Hip ext on leg press 40#   Japanese squat with slow decel 2 10ea Red KB. 12\" box   Split Squat jumps 2 10ea With Green Resistance into genu valgum. Focus on landing control.    Leg Press Ecc Full Range 1 15 210# DL, 130SL to fail Ecc lower to chair with both legs to stand 2 10    Lateral step down (dips) 2 10 6\"   Glute side walks White at knees   clamshells No band, floating the top leg   Squat to wall 10    Glute med iso's 10 Basketball overhead   EOB alt hip ext c feet off floor 10    Earthquake Front squat  10 Earthquake+2 red kb, wedges under toes- too much anterior loading   Lateral BOSU lunge 10 4in box under foot, cues for posterior chain   Leg press hip ext Slide Lunge 5sec 10 Each leg   BOSU squat with shot No weight   Resisted step up    Front load squat with med knee pull, soleus raise 2 10    1/2 kneeling hip flexor stretch 30sec 3 bilat   Hip Matrix Randall and ITBand stretching 30 3    Manual Intervention (25496)  Min: 15'      Knee mobs/PROM            Patella Mobs      DN   R quad and patella tendon   STM/IASTM 1 10 Right quadriceps (VL, RF), patellar tendon   IASTM 1 5 Right Patellar tendon      Right Hip mobs      NMR re-education (95222)  Min:   CUES NEEDED   Martiniquais/Biofeedback 10/10      BFR      G. Med activation      Hip Ext full ROM/ G. Activation      Bosu Bal and Prop- G Med      Single leg stance/Balance/Prop      Bosu Retro G. Med act      Prone Hip froggers- sliders/elevated      Lateral hip Slider Therapeutic Activity (37274)  Min:       Ladders      Plyos      Dynamic Balance      Step up with sport cord (blue) Knee drive/layup with slow eccentric lower                    Therapeutic Exercise and NMR EXR  [x] (63943) Provided verbal/tactile cueing for activities related to strengthening, flexibility, endurance, ROM for improvements in LE, proximal hip, and core control with self care, mobility, lifting, ambulation. [x] (27010) Provided verbal/tactile cueing for activities related to improving balance, coordination, kinesthetic sense, posture, motor skill, proprioception  to assist with LE, proximal hip, and core control in self care, mobility, lifting, ambulation and eccentric single leg control.      NMR and Therapeutic Activities:    [x] (57855 or 11017) Provided verbal/tactile cueing for activities related to improving balance, coordination, kinesthetic sense, posture, motor skill, proprioception and motor activation to allow for proper function of core, proximal hip and LE with self care and ADLs and functional mobility.   [] (40775) Gait Re-education- Provided training and instruction to the patient for proper LE, core and proximal hip recruitment and positioning and eccentric body weight control with ambulation re-education including up and down stairs     Home Exercise Program:    [x] (99888) Reviewed/Progressed HEP activities related to strengthening, flexibility, endurance, ROM of core, proximal hip and LE for functional self-care, mobility, lifting and ambulation/stair navigation   [] (96813)Reviewed/Progressed HEP activities related to improving balance, coordination, kinesthetic sense, posture, motor skill, proprioception of core, proximal hip and LE for self care, mobility, lifting, and ambulation/stair navigation      Manual Treatments:  PROM / STM / Oscillations-Mobs:  G-I, II, III, IV (PA's, Inf., Post.)  [x] (20495) Provided manual therapy to mobilize LE, proximal hip and/or LS spine soft tissue/joints for the purpose of modulating pain, promoting relaxation,  increasing ROM, reducing/eliminating soft tissue swelling/inflammation/restriction, improving soft tissue extensibility and allowing for proper ROM for normal function with self care, mobility, lifting and ambulation. Modalities:  ice wrapped on knees to go. [] GAME READY (VASO)- for significant edema, swelling, pain control.      Charges:  Timed Code Treatment Minutes: 35   Total Treatment Minutes: 35      [] EVAL (LOW) 05572 (typically 20 minutes face-to-face)  [] EVAL (MOD) 20685 (typically 30 minutes face-to-face)  [] EVAL (HIGH) 66860 (typically 45 minutes face-to-face)  [] RE-EVAL      [x] (76521) x   1  [] DRY NEEDLE 1 OR 2 MUSCLES  [] NMR (22347) x     [] DRY NEEDLE 3+ MUSCLES  [x] Manual (54178) x  1    [] TA (65886) x     [] Mech Traction (34522)  [] ES(attended) (10875)     [] ES (un) (49380):   [] VASO (27426)  [] Other:    If BWC Please Indicate Time In/Out  CPT Code Time in Time out                                   GOALS:  Patient stated goal: Return to pain free sport activities. [x] Progressing: [] Met: [] Not Met: [] Adjusted    Therapist goals for Patient:   Short Term Goals: To be achieved in: 2 weeks  1. Independent in HEP and progression per patient tolerance, in order to prevent re-injury. [] Progressing: [x] Met: [] Not Met: [] Adjusted  2. Patient will have a decrease in pain to facilitate improvement in movement, function, and ADLs as indicated by Functional Deficits. [] Progressing: [x] Met: [] Not Met: [] Adjusted    Long Term Goals: To be achieved in: 8 weeks  1. Disability index score of 10% or less for the LEFS to assist with reaching prior level of function. [] Progressing: [x] Met: [] Not Met: [] Adjusted  2. Patient will demonstrate increased AROM for hip extension during functional length assessment [employer] Test) allow for proper joint functioning as indicated by patients Functional Deficits. [] Progressing: [x] Met: [] Not Met: [] Adjusted  3. Patient will demonstrate an increase in Strength to good proximal hip strength and control, within 5lb HHD in LE to allow for proper functional mobility as indicated by patients Functional Deficits. [] Progressing: [x] Met: [] Not Met: [] Adjusted  4. Patient will return to Jumping/Landing/Pivoting functional activities without increased symptoms or restriction. [x] Progressing: [] Met: [] Not Met: [] Adjusted       ASSESSMENT:  Patient felt good after treatment. Making progress with strength and endurance.      Return to Play: (if applicable)   []  Stage 1: Intro to Strength   []  Stage 2: Return to Run and Strength   []  Stage 3: Return to Jump and Strength   []  Stage 4: Dynamic Strength and Agility   []  Stage 5: Sport Specific Training     []  Ready to Return to Play, Meets All Above Stages   []  Not Ready for Return to Sports   Comments:            Treatment/Activity Tolerance:  [x] Patient tolerated treatment well [] Patient limited by fatique  [] Patient limited by pain  [] Patient limited by other medical complications  [] Other:     Overall Progression Towards Functional goals/ Treatment Progress Update:  [x] Patient is progressing as expected towards functional goals listed. [] Progression is slowed due to complexities/Impairments listed. [] Progression has been slowed due to co-morbidities. [] Plan just implemented, too soon to assess goals progression <30days   [] Goals require adjustment due to lack of progress  [] Patient is not progressing as expected and requires additional follow up with physician  [] Other    Prognosis for POC: [x] Good [] Fair  [] Poor    Patient requires continued skilled intervention: [x] Yes  [] No        PLAN:    [x] Continue per plan of care [] Alter current plan (see comments)  [] Plan of care initiated [] Hold pending MD visit [] Discharge    Electronically signed by:     Eduardo Blackwell PT      Note: If patient does not return for scheduled/recommended follow up visits, this note will serve as a discharge from care along with the most recent update on progress.

## 2022-10-12 ENCOUNTER — APPOINTMENT (OUTPATIENT)
Dept: PHYSICAL THERAPY | Age: 22
End: 2022-10-12
Payer: MEDICAID

## 2022-10-19 ENCOUNTER — HOSPITAL ENCOUNTER (OUTPATIENT)
Dept: PHYSICAL THERAPY | Age: 22
Setting detail: THERAPIES SERIES
Discharge: HOME | End: 2022-10-19
Payer: COMMERCIAL

## 2022-10-19 PROCEDURE — 97110 THERAPEUTIC EXERCISES: CPT

## 2022-10-19 PROCEDURE — 97140 MANUAL THERAPY 1/> REGIONS: CPT

## 2022-10-19 NOTE — FLOWSHEET NOTE
[place] [address] [address]  Phone: [phone] Fax: [phone]               Date:  10/19/2022    Patient Name:  Felicity Amaya    :  2000  MRN: [id number]  Restrictions/Precautions:    Medical/Treatment Diagnosis Information:  Diagnosis: right patellar tendonitis, left quad tendon partial tear  Treatment Diagnosis: M25.561, A02.641  Insurance/Certification information:  PT Insurance Information: BCBS/AG/[place]  Physician Information:  Referring Practitioner: Marlee Méndez MD  Plan of care signed (Y/N):     Date of Patient follow up with Physician:      Progress Report: []  Yes  [x]  No     Date Range for reporting period:  Beginnin2022  Endin2022    Progress report due (10 Rx/or 30 days whichever is less):      Recertification due (POC duration/ or 90 days whichever is less):      Visit # Insurance Allowable Auth Needed   18 60/MU []Yes   [x]No     Latex Allergy:  [x]NO      []YES  Preferred Language for Healthcare:   [x]English       []other:  Functional Scale: LEFS, 50% Date assessed: 2022    Pain level:  Rest- 0/10     SUBJECTIVE:  Soreness in right knee today, Did BFR yesterday and felt good with that. OBJECTIVE:   Observation: tender through right lateral patellar tendon  Test measurements:    RESTRICTIONS/PRECAUTIONS: none    Exercises/Interventions:     Therapeutic Ex (45158)   Min: 20 Sets/sec Reps Notes/CUES   Retro Stepper/BIKE      Squat with rotation 2 blue KB   BFR  Blue cuff, 80%, SLR, SL abd, LAQ, HC, squat, HR   Resisted walk 1 10    RDL (SL) Back foot on wall   Step Ups c SC 6in box + SC at waist   Hip stretches on sliders, quad stretch 5' Hip ext on leg press 40#   Azerbaijani squat with slow decel 2 10ea Red KB. 12\" box   Split Squat jumps 2 10ea With Green Resistance into genu valgum. Focus on landing control.    Leg Press Ecc Full Range 1 15 210# DL, 130SL to fail   Ecc lower to chair with both legs to stand 2 10    Lateral step down (dips) 2 10 6\"   Glute side walks White at knees   clamshells No band, floating the top leg   Squat to wall 10    Glute med iso's 10 Basketball overhead   EOB alt hip ext c feet off floor 10    Earthquake Front squat  10 Earthquake+2 red kb, wedges under toes- too much anterior loading   Lateral BOSU lunge 10 4in box under foot, cues for posterior chain   Leg press hip ext Slide Lunge 5sec 10 Each leg   BOSU squat with shot No weight   Resisted step up    Front load squat with med knee pull, soleus raise 2 10    1/2 kneeling hip flexor stretch 30sec 3 bilat   Hip Matrix Randall and ITBand stretching 30 3    Manual Intervention (68186)  Min: 15'      Knee mobs/PROM            Patella Mobs      DN   R quad and patella tendon   STM/IASTM 1 10 Right quadriceps (VL, RF), patellar tendon   IASTM 1 5 Right Patellar tendon      Right Hip mobs      NMR re-education (79043)  Min:   CUES NEEDED   Italian/Biofeedback 10/10      BFR      G. Med activation      Hip Ext full ROM/ G. Activation      Bosu Bal and Prop- G Med      Single leg stance/Balance/Prop      Bosu Retro G. Med act      Prone Hip froggers- sliders/elevated      Lateral hip Slider Therapeutic Activity (69309)  Min:       Ladders      Plyos      Dynamic Balance      Step up with sport cord (blue) Knee drive/layup with slow eccentric lower                    Therapeutic Exercise and NMR EXR  [x] (24940) Provided verbal/tactile cueing for activities related to strengthening, flexibility, endurance, ROM for improvements in LE, proximal hip, and core control with self care, mobility, lifting, ambulation. [x] (25912) Provided verbal/tactile cueing for activities related to improving balance, coordination, kinesthetic sense, posture, motor skill, proprioception  to assist with LE, proximal hip, and core control in self care, mobility, lifting, ambulation and eccentric single leg control. NMR and Therapeutic Activities:    [x] (61868 or 23564) Provided verbal/tactile cueing for activities related to improving balance, coordination, kinesthetic sense, posture, motor skill, proprioception and motor activation to allow for proper function of core, proximal hip and LE with self care and ADLs and functional mobility.   [] (83038) Gait Re-education- Provided training and instruction to the patient for proper LE, core and proximal hip recruitment and positioning and eccentric body weight control with ambulation re-education including up and down stairs     Home Exercise Program:    [x] (97178) Reviewed/Progressed HEP activities related to strengthening, flexibility, endurance, ROM of core, proximal hip and LE for functional self-care, mobility, lifting and ambulation/stair navigation   [] (99062)Reviewed/Progressed HEP activities related to improving balance, coordination, kinesthetic sense, posture, motor skill, proprioception of core, proximal hip and LE for self care, mobility, lifting, and ambulation/stair navigation      Manual Treatments:  PROM / STM / Oscillations-Mobs:  G-I, II, III, IV (PA's, Inf., Post.)  [x] (24475) Provided manual therapy to mobilize LE, proximal hip and/or LS spine soft tissue/joints for the purpose of modulating pain, promoting relaxation,  increasing ROM, reducing/eliminating soft tissue swelling/inflammation/restriction, improving soft tissue extensibility and allowing for proper ROM for normal function with self care, mobility, lifting and ambulation. Modalities:  ice wrapped on knees to go. [] GAME READY (VASO)- for significant edema, swelling, pain control.      Charges:  Timed Code Treatment Minutes: 35   Total Treatment Minutes: 35      [] EVAL (LOW) 34124 (typically 20 minutes face-to-face)  [] EVAL (MOD) 20365 (typically 30 minutes face-to-face)  [] EVAL (HIGH) 43184 (typically 45 minutes face-to-face)  [] RE-EVAL      [x] (68679) x   1  [] DRY NEEDLE 1 OR 2 MUSCLES  [] NMR (90211) x     [] DRY NEEDLE 3+ MUSCLES  [x] Manual (98227) x  1    [] TA (15951) x     [] Mech Traction (47325)  [] ES(attended) (63559)     [] ES (un) (63023):   [] VASO (18173)  [] Other:    If BWC Please Indicate Time In/Out  CPT Code Time in Time out                                   GOALS:  Patient stated goal: Return to pain free sport activities. [x] Progressing: [] Met: [] Not Met: [] Adjusted    Therapist goals for Patient:   Short Term Goals: To be achieved in: 2 weeks  1. Independent in HEP and progression per patient tolerance, in order to prevent re-injury. [] Progressing: [x] Met: [] Not Met: [] Adjusted  2. Patient will have a decrease in pain to facilitate improvement in movement, function, and ADLs as indicated by Functional Deficits. [] Progressing: [x] Met: [] Not Met: [] Adjusted    Long Term Goals: To be achieved in: 8 weeks  1. Disability index score of 10% or less for the LEFS to assist with reaching prior level of function. [] Progressing: [x] Met: [] Not Met: [] Adjusted  2. Patient will demonstrate increased AROM for hip extension during functional length assessment [employer] Test) allow for proper joint functioning as indicated by patients Functional Deficits. [] Progressing: [x] Met: [] Not Met: [] Adjusted  3. Patient will demonstrate an increase in Strength to good proximal hip strength and control, within 5lb HHD in LE to allow for proper functional mobility as indicated by patients Functional Deficits. [] Progressing: [x] Met: [] Not Met: [] Adjusted  4. Patient will return to Jumping/Landing/Pivoting functional activities without increased symptoms or restriction. [x] Progressing: [] Met: [] Not Met: [] Adjusted       ASSESSMENT:  Patient felt good after treatment. Making progress with strength and endurance.      Return to Play: (if applicable)   []  Stage 1: Intro to Strength   []  Stage 2: Return to Run and Strength   []  Stage 3: Return to Jump and Strength   []  Stage 4: Dynamic Strength and Agility   []  Stage 5: Sport Specific Training     []  Ready to Return to Play, Meets All Above Stages   []  Not Ready for Return to Sports   Comments:            Treatment/Activity Tolerance:  [x] Patient tolerated treatment well [] Patient limited by fatique  [] Patient limited by pain  [] Patient limited by other medical complications  [] Other:     Overall Progression Towards Functional goals/ Treatment Progress Update:  [x] Patient is progressing as expected towards functional goals listed. [] Progression is slowed due to complexities/Impairments listed. [] Progression has been slowed due to co-morbidities. [] Plan just implemented, too soon to assess goals progression <30days   [] Goals require adjustment due to lack of progress  [] Patient is not progressing as expected and requires additional follow up with physician  [] Other    Prognosis for POC: [x] Good [] Fair  [] Poor    Patient requires continued skilled intervention: [x] Yes  [] No        PLAN:    [x] Continue per plan of care [] Alter current plan (see comments)  [] Plan of care initiated [] Hold pending MD visit [] Discharge    Electronically signed by:     Gonzalez Leggett PTA      Note: If patient does not return for scheduled/recommended follow up visits, this note will serve as a discharge from care along with the most recent update on progress.

## 2022-10-24 ENCOUNTER — HOSPITAL ENCOUNTER (OUTPATIENT)
Dept: PHYSICAL THERAPY | Age: 22
Setting detail: THERAPIES SERIES
Discharge: HOME | End: 2022-10-24
Payer: MEDICAID

## 2022-10-24 PROCEDURE — 97140 MANUAL THERAPY 1/> REGIONS: CPT

## 2022-10-24 NOTE — FLOWSHEET NOTE
[facility] [address]  Phone: [phone] Fax: [phone]               Date:  10/24/2022    Patient Name:  Los Thrasher    :  2000  MRN: [id number]  Restrictions/Precautions:    Medical/Treatment Diagnosis Information:  Diagnosis: right patellar tendonitis, left quad tendon partial tear  Treatment Diagnosis: M25.561, A80.952  Insurance/Certification information:  PT Insurance Information: BCBS/[place]  Physician Information:  Referring Practitioner: Darwin Rich MD  Plan of care signed (Y/N):     Date of Patient follow up with Physician:      Progress Report: []  Yes  [x]  No     Date Range for reporting period:  Beginnin2022  Endin2022    Progress report due (10 Rx/or 30 days whichever is less):      Recertification due (POC duration/ or 90 days whichever is less):      Visit # Insurance Allowable Auth Needed    60/MU []Yes   [x]No     Latex Allergy:  [x]NO      []YES  Preferred Language for Healthcare:   [x]English       []other:  Functional Scale: LEFS, 50% Date assessed: 2022    Pain level:  Rest- 0/10     SUBJECTIVE:  Patient reports significant irritation in knee today. She had a workout this morning and will have another this afternoon. OBJECTIVE:   Observation: noted increased swelling through right knee  Test measurements:    RESTRICTIONS/PRECAUTIONS: none    Exercises/Interventions:     Therapeutic Ex (77098)   Min:  HELD Sets/sec Reps Notes/CUES   Retro Stepper/BIKE      Squat with rotation 2 blue KB   BFR  Blue cuff, 80%, SLR, SL abd, LAQ, HC, squat, HR   Resisted walk 1 10    RDL (SL) Back foot on wall   Step Ups c SC 6in box + SC at waist   Hip stretches on sliders, quad stretch 5' Hip ext on leg press 40#   Malay squat with slow decel 2 10ea Red KB. 12\" box   Split Squat jumps 2 10ea With Green Resistance into genu valgum. Focus on landing control. Leg Press Ecc Full Range 1 15 210# DL, 130SL to fail   Ecc lower to chair with both legs to stand 2 10    Lateral step down (dips) 2 10 6\"   Glute side walks White at knees   clamshells No band, floating the top leg   Squat to wall 10    Glute med iso's 10 Basketball overhead   EOB alt hip ext c feet off floor 10    Earthquake Front squat  10 Earthquake+2 red kb, wedges under toes- too much anterior loading   Lateral BOSU lunge 10 4in box under foot, cues for posterior chain   Leg press hip ext Slide Lunge 5sec 10 Each leg   BOSU squat with shot No weight   Resisted step up    Front load squat with med knee pull, soleus raise 2 10    1/2 kneeling hip flexor stretch 30sec 3 bilat   Hip Matrix Randall and ITBand stretching 30 3    Manual Intervention (17875)  Min: 15'      Knee mobs/PROM            Patella Mobs      DN   R quad and patella tendon   STM/IASTM 1 10 Right quadriceps (VL, RF), patellar tendon   IASTM 1 5 Right Patellar tendon      Right Hip mobs      NMR re-education (12486)  Min:   CUES NEEDED   French/Biofeedback 10/10      BFR      G. Med activation      Hip Ext full ROM/ G. Activation      Bosu Bal and Prop- G Med      Single leg stance/Balance/Prop      Bosu Retro G. Med act      Prone Hip froggers- sliders/elevated      Lateral hip Slider Therapeutic Activity (79952)  Min:       Ladders      Plyos      Dynamic Balance      Step up with sport cord (blue) Knee drive/layup with slow eccentric lower                    Therapeutic Exercise and NMR EXR  [x] (26610) Provided verbal/tactile cueing for activities related to strengthening, flexibility, endurance, ROM for improvements in LE, proximal hip, and core control with self care, mobility, lifting, ambulation.   [x] (90911) Provided verbal/tactile cueing for activities related to improving balance, coordination, kinesthetic sense, posture, motor skill, proprioception  to assist with LE, proximal hip, and core control in self care, mobility, lifting, ambulation and eccentric single leg control. NMR and Therapeutic Activities:    [x] (59336 or 29102) Provided verbal/tactile cueing for activities related to improving balance, coordination, kinesthetic sense, posture, motor skill, proprioception and motor activation to allow for proper function of core, proximal hip and LE with self care and ADLs and functional mobility.   [] (90128) Gait Re-education- Provided training and instruction to the patient for proper LE, core and proximal hip recruitment and positioning and eccentric body weight control with ambulation re-education including up and down stairs     Home Exercise Program:    [x] (87773) Reviewed/Progressed HEP activities related to strengthening, flexibility, endurance, ROM of core, proximal hip and LE for functional self-care, mobility, lifting and ambulation/stair navigation   [] (13676)Reviewed/Progressed HEP activities related to improving balance, coordination, kinesthetic sense, posture, motor skill, proprioception of core, proximal hip and LE for self care, mobility, lifting, and ambulation/stair navigation      Manual Treatments:  PROM / STM / Oscillations-Mobs:  G-I, II, III, IV (PA's, Inf., Post.)  [x] (55873) Provided manual therapy to mobilize LE, proximal hip and/or LS spine soft tissue/joints for the purpose of modulating pain, promoting relaxation,  increasing ROM, reducing/eliminating soft tissue swelling/inflammation/restriction, improving soft tissue extensibility and allowing for proper ROM for normal function with self care, mobility, lifting and ambulation. Modalities:  ice wrapped on knees to go. [] GAME READY (VASO)- for significant edema, swelling, pain control.      Charges:  Timed Code Treatment Minutes: 15   Total Treatment Minutes: 30      [] EVAL (LOW) 12396 (typically 20 minutes face-to-face)  [] EVAL (MOD) 25342 (typically 30 minutes face-to-face)  [] EVAL (HIGH) 91213 (typically 45 minutes face-to-face)  [] RE-EVAL [] AO(51970) x     [] DRY NEEDLE 1 OR 2 MUSCLES  [] NMR (07833) x     [] DRY NEEDLE 3+ MUSCLES  [x] Manual (19571) x  1    [] TA (79237) x     [] Mech Traction (57056)  [] ES(attended) (24550)     [] ES (un) (01669):   [x] VASO (52481)  [] Other:    If [facility] Please Indicate Time In/Out  CPT Code Time in Time out                                   GOALS:  Patient stated goal: Return to pain free sport activities. [x] Progressing: [] Met: [] Not Met: [] Adjusted    Therapist goals for Patient:   Short Term Goals: To be achieved in: 2 weeks  1. Independent in HEP and progression per patient tolerance, in order to prevent re-injury. [] Progressing: [x] Met: [] Not Met: [] Adjusted  2. Patient will have a decrease in pain to facilitate improvement in movement, function, and ADLs as indicated by Functional Deficits. [] Progressing: [x] Met: [] Not Met: [] Adjusted    Long Term Goals: To be achieved in: 8 weeks  1. Disability index score of 10% or less for the LEFS to assist with reaching prior level of function. [] Progressing: [x] Met: [] Not Met: [] Adjusted  2. Patient will demonstrate increased AROM for hip extension during functional length assessment Bekah Lacks Test) allow for proper joint functioning as indicated by patients Functional Deficits. [] Progressing: [x] Met: [] Not Met: [] Adjusted  3. Patient will demonstrate an increase in Strength to good proximal hip strength and control, within 5lb HHD in LE to allow for proper functional mobility as indicated by patients Functional Deficits. [] Progressing: [x] Met: [] Not Met: [] Adjusted  4. Patient will return to Jumping/Landing/Pivoting functional activities without increased symptoms or restriction. [x] Progressing: [] Met: [] Not Met: [] Adjusted       ASSESSMENT:  Patient continues to have good and bad days. Held most exercises due to increased inflammation today.       Return to Play: (if applicable)   []  Stage 1: Intro to Strength   []  Stage 2: Return to Run and Strength   []  Stage 3: Return to Jump and Strength   []  Stage 4: Dynamic Strength and Agility   []  Stage 5: Sport Specific Training     []  Ready to Return to Play, Meets All Above Stages   []  Not Ready for Return to Sports   Comments:            Treatment/Activity Tolerance:  [x] Patient tolerated treatment well [] Patient limited by fatique  [] Patient limited by pain  [] Patient limited by other medical complications  [] Other:     Overall Progression Towards Functional goals/ Treatment Progress Update:  [x] Patient is progressing as expected towards functional goals listed. [] Progression is slowed due to complexities/Impairments listed. [] Progression has been slowed due to co-morbidities. [] Plan just implemented, too soon to assess goals progression <30days   [] Goals require adjustment due to lack of progress  [] Patient is not progressing as expected and requires additional follow up with physician  [] Other    Prognosis for POC: [x] Good [] Fair  [] Poor    Patient requires continued skilled intervention: [x] Yes  [] No        PLAN:    [x] Continue per plan of care [] Alter current plan (see comments)  [] Plan of care initiated [] Hold pending MD visit [] Discharge    Electronically signed by:     Rachael Farley PT      Note: If patient does not return for scheduled/recommended follow up visits, this note will serve as a discharge from care along with the most recent update on progress.

## 2022-11-09 ENCOUNTER — HOSPITAL ENCOUNTER (OUTPATIENT)
Dept: PHYSICAL THERAPY | Age: 22
Setting detail: THERAPIES SERIES
Discharge: HOME | End: 2022-11-09
Payer: COMMERCIAL

## 2022-11-09 PROCEDURE — 20560 NDL INSJ W/O NJX 1 OR 2 MUSC: CPT

## 2022-11-09 PROCEDURE — 97110 THERAPEUTIC EXERCISES: CPT

## 2022-11-09 PROCEDURE — 97032 APPL MODALITY 1+ESTIM EA 15: CPT

## 2022-11-09 PROCEDURE — 97140 MANUAL THERAPY 1/> REGIONS: CPT

## 2022-11-09 NOTE — FLOWSHEET NOTE
[place] [address] [facility] [address]  Phone: [phone] Fax: [phone]               Date:  2022    Patient Name:  Mavis Tiwari    :  2000  MRN: [id number]  Restrictions/Precautions:    Medical/Treatment Diagnosis Information:  Diagnosis: right patellar tendonitis, left quad tendon partial tear  Treatment Diagnosis: M25.561, R55.105  Insurance/Certification information:  PT Insurance Information: BCBS//[place]  Physician Information:  Referring Practitioner: Fely Guerin MD  Plan of care signed (Y/N):     Date of Patient follow up with Physician:      Progress Report: []  Yes  [x]  No     Date Range for reporting period:  Beginnin2022  Endin2022    Progress report due (10 Rx/or 30 days whichever is less):      Recertification due (POC duration/ or 90 days whichever is less):      Visit # Insurance Allowable Auth Needed   20 60/MU []Yes   [x]No     Latex Allergy:  [x]NO      []YES  Preferred Language for Healthcare:   [x]English       []other:  Functional Scale: LEFS, 50% Date assessed: 2022    Pain level:  Rest- 0/10     SUBJECTIVE:  Patient states continued tightness through hips despite ATC working on her already. She also has some Patellar tendon pain which she would like to have DN on.       OBJECTIVE:   Observation: unable to get up from iso quad in half kneeling with right knee down  Test measurements:    RESTRICTIONS/PRECAUTIONS: none    Exercises/Interventions:     Therapeutic Ex (96443)   Min: 20 Sets/sec Reps Notes/CUES   Retro Stepper/BIKE      Squat with rotation 2 blue KB   BFR  Blue cuff, 80%, SLR, SL abd, LAQ, HC, squat, HR   Resisted walk 1 10    RDL (SL) Back foot on wall   Step Ups c SC 6in box + SC at waist   Hip stretches on sliders, quad stretch 5' Hip ext on leg press 40#   [place] squat with slow decel 2 10ea Red KB. 12\" box   Split Squat jumps With Honey Palm Resistance into genu valgum. Focus on landing control. Leg Press Ecc Full Range 210# DL, 130SL to fail   Ecc lower to chair with both legs to stand    Lateral step down (dips) 6\"   Glute side walks White at knees   clamshells No band, floating the top leg   Squat to wall 10    Glute med iso's 10 Basketball overhead   EOB alt hip ext c feet off floor 10    Earthquake Front squat  10 Earthquake+2 red kb, wedges under toes- too much anterior loading   Lateral BOSU lunge 10 4in box under foot, cues for posterior chain   Leg press hip ext Slide Lunge 5sec 10 Each leg   1/2 kneeling iso quad 1 10 Each side             1/2 kneeling hip flexor stretch 30sec 3 bilat   Hip Matrix Randall and ITBand stretching 30 3    Manual Intervention (39085)  Min: 30'      Knee mobs/PROM            Patella Mobs      DN 15'  R quad and patella tendon   STM/IASTM 1 10 Right quadriceps (VL, RF), patellar tendon   IASTM 1 5 Right Patellar tendon      Right Hip mobs      NMR re-education (29233)  Min:   CUES NEEDED   Sudanese/Biofeedback 10/10      BFR      G. Med activation      Hip Ext full ROM/ G. Activation      Bosu Bal and Prop- G Med      Single leg stance/Balance/Prop      Bosu Retro G. Med act      Prone Hip froggers- sliders/elevated      Lateral hip Slider Therapeutic Activity (63411)  Min:       Ladders      Plyos      Dynamic Balance      Step up with sport cord (blue) Knee drive/layup with slow eccentric lower               Spoke with   regarding the use of Dry Needling      Dry needling manual therapy: consisted on the placement of 6 needles in the following muscles: VMO, ITB, inferior patella. A 40-60 mm needle was inserted, piston, rotated, and coned to produce intramuscular mobilization. These techniques were used to restore functional range of motion, reduce muscle spasm and induce healing in the corresponding musculature. (47409)  Clean Technique was utilized today while applying Dry needling treatment.   The treatment sites where cleaned with 70% solution of  isopropyl alcohol . The PT washed their hands and utilized treatment gloves along with hand  prior to inserting the needles. All needles where removed and discarded in the appropriate sharps container. MD has given verbal and/or written approval for this treatment. Attended low frequency (1-20Hz) electrical stimulation was utilized in conjunction with Dry Needling:  the Estim was manipulated between all above needles for a period of 10 min. at 4-6 volts. The low frequency electrical stimulation was used to help reduce muscle spasm and help to interrupt /[place] the pain cycle. (59338)        Therapeutic Exercise and NMR EXR  [x] (15558) Provided verbal/tactile cueing for activities related to strengthening, flexibility, endurance, ROM for improvements in LE, proximal hip, and core control with self care, mobility, lifting, ambulation. [x] (31180) Provided verbal/tactile cueing for activities related to improving balance, coordination, kinesthetic sense, posture, motor skill, proprioception  to assist with LE, proximal hip, and core control in self care, mobility, lifting, ambulation and eccentric single leg control.      NMR and Therapeutic Activities:    [x] (03187 or 69529) Provided verbal/tactile cueing for activities related to improving balance, coordination, kinesthetic sense, posture, motor skill, proprioception and motor activation to allow for proper function of core, proximal hip and LE with self care and ADLs and functional mobility.   [] (06267) Gait Re-education- Provided training and instruction to the patient for proper LE, core and proximal hip recruitment and positioning and eccentric body weight control with ambulation re-education including up and down stairs     Home Exercise Program:    [x] (77197) Reviewed/Progressed HEP activities related to strengthening, flexibility, endurance, ROM of core, proximal hip and LE for functional self-care, mobility, lifting and ambulation/stair navigation   [] (83570)Reviewed/Progressed HEP activities related to improving balance, coordination, kinesthetic sense, posture, motor skill, proprioception of core, proximal hip and LE for self care, mobility, lifting, and ambulation/stair navigation      Manual Treatments:  PROM / STM / Oscillations-Mobs:  G-I, II, III, IV (PA's, Inf., Post.)  [x] (44596) Provided manual therapy to mobilize LE, proximal hip and/or LS spine soft tissue/joints for the purpose of modulating pain, promoting relaxation,  increasing ROM, reducing/eliminating soft tissue swelling/inflammation/restriction, improving soft tissue extensibility and allowing for proper ROM for normal function with self care, mobility, lifting and ambulation. Modalities:  ice wrapped on knees to go. [] GAME READY (VASO)- for significant edema, swelling, pain control. Charges:  Timed Code Treatment Minutes: 30   Total Treatment Minutes: 45      [] EVAL (LOW) 27888 (typically 20 minutes face-to-face)  [] EVAL (MOD) 69869 (typically 30 minutes face-to-face)  [] EVAL (HIGH) 15536 (typically 45 minutes face-to-face)  [] RE-EVAL      [x] MK(40365) x   1  [x] DRY NEEDLE 1 OR 2 MUSCLES  [] NMR (21054) x     [] DRY NEEDLE 3+ MUSCLES  [x] Manual (73495) x  1    [] TA (26889) x     [] Mech Traction (38771)  [x] ES(attended) (33554)     [] ES (un) (64846):   [] VASO (37021)  [] Other:    If [facility] Please Indicate Time In/Out  CPT Code Time in Time out                                   GOALS:  Patient stated goal: Return to pain free sport activities. [x] Progressing: [] Met: [] Not Met: [] Adjusted    Therapist goals for Patient:   Short Term Goals: To be achieved in: 2 weeks  1. Independent in HEP and progression per patient tolerance, in order to prevent re-injury. [] Progressing: [x] Met: [] Not Met: [] Adjusted  2.  Patient will have a decrease in pain to facilitate improvement in movement, function, and ADLs as indicated by Functional Deficits. [] Progressing: [x] Met: [] Not Met: [] Adjusted    Long Term Goals: To be achieved in: 8 weeks  1. Disability index score of 10% or less for the LEFS to assist with reaching prior level of function. [] Progressing: [x] Met: [] Not Met: [] Adjusted  2. Patient will demonstrate increased AROM for hip extension during functional length assessment Johney Parry Test) allow for proper joint functioning as indicated by patients Functional Deficits. [] Progressing: [x] Met: [] Not Met: [] Adjusted  3. Patient will demonstrate an increase in Strength to good proximal hip strength and control, within 5lb HHD in LE to allow for proper functional mobility as indicated by patients Functional Deficits. [] Progressing: [x] Met: [] Not Met: [] Adjusted  4. Patient will return to Jumping/Landing/Pivoting functional activities without increased symptoms or restriction. [x] Progressing: [] Met: [] Not Met: [] Adjusted       ASSESSMENT:  Patient has variable pain depending on volume of basketball. Dn helpful. Concerned with inability to dissociate hips during running, may be increasing load to Patellar tendon. Return to Play: (if applicable)   []  Stage 1: Intro to Strength   []  Stage 2: Return to Run and Strength   []  Stage 3: Return to Jump and Strength   []  Stage 4: Dynamic Strength and Agility   []  Stage 5: Sport Specific Training     []  Ready to Return to Play, Meets All Above Stages   []  Not Ready for Return to Sports   Comments:            Treatment/Activity Tolerance:  [x] Patient tolerated treatment well [] Patient limited by fatique  [] Patient limited by pain  [] Patient limited by other medical complications  [] Other:     Overall Progression Towards Functional goals/ Treatment Progress Update:  [x] Patient is progressing as expected towards functional goals listed. [] Progression is slowed due to complexities/Impairments listed.   [] Progression has been slowed due to co-morbidities. [] Plan just implemented, too soon to assess goals progression <30days   [] Goals require adjustment due to lack of progress  [] Patient is not progressing as expected and requires additional follow up with physician  [] Other    Prognosis for POC: [x] Good [] Fair  [] Poor    Patient requires continued skilled intervention: [x] Yes  [] No        PLAN:    [x] Continue per plan of care [] Alter current plan (see comments)  [] Plan of care initiated [] Hold pending MD visit [] Discharge    Electronically signed by:     Leonardo Arevalo PT      Note: If patient does not return for scheduled/recommended follow up visits, this note will serve as a discharge from care along with the most recent update on progress.

## 2022-11-14 ENCOUNTER — HOSPITAL ENCOUNTER (OUTPATIENT)
Dept: PHYSICAL THERAPY | Age: 22
Setting detail: THERAPIES SERIES
Discharge: HOME | End: 2022-11-14
Payer: COMMERCIAL

## 2022-11-14 PROCEDURE — 97110 THERAPEUTIC EXERCISES: CPT

## 2022-11-14 PROCEDURE — 97140 MANUAL THERAPY 1/> REGIONS: CPT

## 2022-11-14 NOTE — FLOWSHEET NOTE
[place] [address] [facility] [address]  Phone: [phone] Fax: [phone]               Date:  2022    Patient Name:  Bart Mccoy    :  2000  MRN: [id number]  Restrictions/Precautions:    Medical/Treatment Diagnosis Information:  Diagnosis: right patellar tendonitis, left quad tendon partial tear  Treatment Diagnosis: M25.561, O27.348  Insurance/Certification information:  PT Insurance Information: BCBS/AG/[place]  Physician Information:  Referring Practitioner: Titi Urbina MD  Plan of care signed (Y/N):     Date of Patient follow up with Physician:      Progress Report: []  Yes  [x]  No     Date Range for reporting period:  Beginnin2022  Endin2022    Progress report due (10 Rx/or 30 days whichever is less):      Recertification due (POC duration/ or 90 days whichever is less): [id number]     Visit # Insurance Allowable Auth Needed   20 60/MU []Yes   [x]No     Latex Allergy:  [x]NO      []YES  Preferred Language for Healthcare:   [x]English       []other:  Functional Scale: LEFS, 50% Date assessed: 2022    Pain level:  Rest- 0/10     SUBJECTIVE:  Patient reports she felt better after last visit but is overall doing ok. She has some tightness in quad, back is feeling better. OBJECTIVE:   Observation: unable to get up from iso quad in half kneeling with right knee down  Test measurements:    RESTRICTIONS/PRECAUTIONS: none    Exercises/Interventions:     Therapeutic Ex (41077)   Min: 20 Sets/sec Reps Notes/CUES   Retro Stepper/BIKE      Squat with rotation 2 blue KB   BFR  Blue cuff, 80%, SLR, SL abd, LAQ, HC, squat, HR   Resisted walk 1 10    RDL (SL) Back foot on wall   Step Ups c SC 6in box + SC at waist   Hip stretches on sliders, quad stretch 5' Hip ext on leg press 40#   [place] squat with slow decel 2 10ea Red KB. 12\" box   Split Squat jumps With Green Resistance into genu valgum. Focus on landing control. Leg Press Ecc Full Range 210# DL, 130SL to fail   Ecc lower to chair with both legs to stand    Lateral step down (dips) 6\"   Glute side walks White at knees   clamshells No band, floating the top leg   Squat to wall 10    Glute med iso's 10 Basketball overhead   EOB alt hip ext c feet off floor 10    Earthquake Front squat  10 Earthquake+2 red kb, wedges under toes- too much anterior loading   Lateral BOSU lunge 10 4in box under foot, cues for posterior chain   Leg press hip ext Slide Lunge 5sec 10 Each leg   1/2 kneeling iso quad 1 10 Each side   Pelvis dissociation at wall 2 10    BOSU bridge with march 2 10    1/2 kneeling hip flexor stretch 30sec 3 bilat   Hip Matrix Randall and ITBand stretching 30 3    Manual Intervention (71449)  Min: 20'      Knee mobs/PROM            Patella Mobs      DN   R quad and patella tendon   STM/IASTM 10  Right quadriceps (VL, RF), patellar tendon   I\   Right Patellar tendon      Right Hip mobs 10     NMR re-education (39871)  Min:   CUES NEEDED   [place]/Biofeedback 10/10      BFR      G. Med activation      Hip Ext full ROM/ G. Activation      Bosu Bal and Prop- G Med      Single leg stance/Balance/Prop      Bosu Retro G. Med act      Prone Hip froggers- sliders/elevated      Lateral hip Slider Therapeutic Activity (24899)  Min:       Ladders      Plyos      Dynamic Balance      Step up with sport cord (blue) Knee drive/layup with slow eccentric lower               Spoke with   regarding the use of Dry Needling      Dry needling manual therapy: consisted on the placement of 6 needles in the following muscles: VMO, ITB, inferior patella. A 40-60 mm needle was inserted, piston, rotated, and coned to produce intramuscular mobilization. These techniques were used to restore functional range of motion, reduce muscle spasm and induce healing in the corresponding musculature.  (28319)  Clean Technique was utilized today while applying Dry needling treatment. The treatment sites where cleaned with 70% solution of  isopropyl alcohol . The PT washed their hands and utilized treatment gloves along with hand  prior to inserting the needles. All needles where removed and discarded in the appropriate sharps container. MD has given verbal and/or written approval for this treatment. Attended low frequency (1-20Hz) electrical stimulation was utilized in conjunction with Dry Needling:  the Estim was manipulated between all above needles for a period of 10 min. at 4-6 volts. The low frequency electrical stimulation was used to help reduce muscle spasm and help to interrupt /[place] the pain cycle. (79861)        Therapeutic Exercise and NMR EXR  [x] (39493) Provided verbal/tactile cueing for activities related to strengthening, flexibility, endurance, ROM for improvements in LE, proximal hip, and core control with self care, mobility, lifting, ambulation. [x] (40176) Provided verbal/tactile cueing for activities related to improving balance, coordination, kinesthetic sense, posture, motor skill, proprioception  to assist with LE, proximal hip, and core control in self care, mobility, lifting, ambulation and eccentric single leg control.      NMR and Therapeutic Activities:    [x] (11401 or 83932) Provided verbal/tactile cueing for activities related to improving balance, coordination, kinesthetic sense, posture, motor skill, proprioception and motor activation to allow for proper function of core, proximal hip and LE with self care and ADLs and functional mobility.   [] (86022) Gait Re-education- Provided training and instruction to the patient for proper LE, core and proximal hip recruitment and positioning and eccentric body weight control with ambulation re-education including up and down stairs     Home Exercise Program:    [x] (58335) Reviewed/Progressed HEP activities related to strengthening, flexibility, endurance, ROM of core, proximal hip and LE for functional self-care, mobility, lifting and ambulation/stair navigation   [] (05567)Reviewed/Progressed HEP activities related to improving balance, coordination, kinesthetic sense, posture, motor skill, proprioception of core, proximal hip and LE for self care, mobility, lifting, and ambulation/stair navigation      Manual Treatments:  PROM / STM / Oscillations-Mobs:  G-I, II, III, IV (PA's, Inf., Post.)  [x] (58336) Provided manual therapy to mobilize LE, proximal hip and/or LS spine soft tissue/joints for the purpose of modulating pain, promoting relaxation,  increasing ROM, reducing/eliminating soft tissue swelling/inflammation/restriction, improving soft tissue extensibility and allowing for proper ROM for normal function with self care, mobility, lifting and ambulation. Modalities:  ice wrapped on knees to go. [] GAME READY (VASO)- for significant edema, swelling, pain control. Charges:  Timed Code Treatment Minutes: 40   Total Treatment Minutes: 40      [] EVAL (LOW) 15805 (typically 20 minutes face-to-face)  [] EVAL (MOD) 77156 (typically 30 minutes face-to-face)  [] EVAL (HIGH) 76114 (typically 45 minutes face-to-face)  [] RE-EVAL      [x] XM(33836) x   1  [] DRY NEEDLE 1 OR 2 MUSCLES  [] NMR (03191) x     [] DRY NEEDLE 3+ MUSCLES  [x] Manual (64096) x  1    [] TA (82174) x     [] Mech Traction (54687)  [] ES(attended) (54435)     [] ES (un) (38824):   [] VASO (67522)  [] Other:    If [facility] Please Indicate Time In/Out  CPT Code Time in Time out                                   GOALS:  Patient stated goal: Return to pain free sport activities. [x] Progressing: [] Met: [] Not Met: [] Adjusted    Therapist goals for Patient:   Short Term Goals: To be achieved in: 2 weeks  1. Independent in HEP and progression per patient tolerance, in order to prevent re-injury. [] Progressing: [x] Met: [] Not Met: [] Adjusted  2.  Patient will have a decrease in pain to facilitate improvement in movement, function, and ADLs as indicated by Functional Deficits. [] Progressing: [x] Met: [] Not Met: [] Adjusted    Long Term Goals: To be achieved in: 8 weeks  1. Disability index score of 10% or less for the LEFS to assist with reaching prior level of function. [] Progressing: [x] Met: [] Not Met: [] Adjusted  2. Patient will demonstrate increased AROM for hip extension during functional length assessment Lorre Loots Test) allow for proper joint functioning as indicated by patients Functional Deficits. [] Progressing: [x] Met: [] Not Met: [] Adjusted  3. Patient will demonstrate an increase in Strength to good proximal hip strength and control, within 5lb HHD in LE to allow for proper functional mobility as indicated by patients Functional Deficits. [] Progressing: [x] Met: [] Not Met: [] Adjusted  4. Patient will return to Jumping/Landing/Pivoting functional activities without increased symptoms or restriction. [x] Progressing: [] Met: [] Not Met: [] Adjusted       ASSESSMENT:  Patient continues to have good and bad days. Overall better than last week and feels like work on hips is helpful. Return to Play: (if applicable)   []  Stage 1: Intro to Strength   []  Stage 2: Return to Run and Strength   []  Stage 3: Return to Jump and Strength   []  Stage 4: Dynamic Strength and Agility   []  Stage 5: Sport Specific Training     []  Ready to Return to Play, Meets All Above Stages   []  Not Ready for Return to Sports   Comments:            Treatment/Activity Tolerance:  [x] Patient tolerated treatment well [] Patient limited by fatique  [] Patient limited by pain  [] Patient limited by other medical complications  [] Other:     Overall Progression Towards Functional goals/ Treatment Progress Update:  [x] Patient is progressing as expected towards functional goals listed. [] Progression is slowed due to complexities/Impairments listed. [] Progression has been slowed due to co-morbidities.   [] Plan just implemented, too soon to assess goals progression <30days   [] Goals require adjustment due to lack of progress  [] Patient is not progressing as expected and requires additional follow up with physician  [] Other    Prognosis for POC: [x] Good [] Fair  [] Poor    Patient requires continued skilled intervention: [x] Yes  [] No        PLAN:    [x] Continue per plan of care [] Alter current plan (see comments)  [] Plan of care initiated [] Hold pending MD visit [] Discharge    Electronically signed by:     Eron Hodges PT      Note: If patient does not return for scheduled/recommended follow up visits, this note will serve as a discharge from care along with the most recent update on progress.

## 2022-11-16 ENCOUNTER — APPOINTMENT (OUTPATIENT)
Dept: PHYSICAL THERAPY | Age: 22
End: 2022-11-16
Payer: COMMERCIAL

## 2022-11-21 ENCOUNTER — APPOINTMENT (OUTPATIENT)
Dept: PHYSICAL THERAPY | Age: 22
End: 2022-11-21
Payer: COMMERCIAL

## 2022-12-02 ENCOUNTER — HOSPITAL ENCOUNTER (OUTPATIENT)
Dept: PHYSICAL THERAPY | Age: 22
Setting detail: THERAPIES SERIES
Discharge: HOME | End: 2022-12-02
Payer: COMMERCIAL

## 2022-12-02 PROCEDURE — 97140 MANUAL THERAPY 1/> REGIONS: CPT

## 2022-12-02 PROCEDURE — 97110 THERAPEUTIC EXERCISES: CPT

## 2022-12-02 NOTE — FLOWSHEET NOTE
[place] [address] [facility]  Phone: [phone] Fax: [phone]               Date:  2022    Patient Name:  Mellisa Vickers    :  2000  MRN: [id number]  Restrictions/Precautions:    Medical/Treatment Diagnosis Information:  Diagnosis: right patellar tendonitis, left quad tendon partial tear  Treatment Diagnosis: M25.561, Q51.207  Insurance/Certification information:  PT Insurance Information: BCBS/AG/[place]  Physician Information:  Referring Practitioner: Guy Jackson MD  Plan of care signed (Y/N):     Date of Patient follow up with Physician:      Progress Report: []  Yes  [x]  No     Date Range for reporting period:  Beginnin2022  Endin2022    Progress report due (10 Rx/or 30 days whichever is less):      Recertification due (POC duration/ or 90 days whichever is less): [id number]     Visit # Insurance Allowable Auth Needed   22 60/MU []Yes   [x]No     Latex Allergy:  [x]NO      []YES  Preferred Language for Healthcare:   [x]English       []other:  Functional Scale: LEFS, 50% Date assessed: 2022    Pain level:  Rest- 0/10     SUBJECTIVE:  Patient reports hip and knee are a bit tight today but overall is feeling ok. Volume of basketball is high, so she has some normal increase in muscle soreness. OBJECTIVE:   Observation: tight through right anterior hip  Test measurements:    RESTRICTIONS/PRECAUTIONS: none    Exercises/Interventions:     Therapeutic Ex (56220)   Min: 20 Sets/sec Reps Notes/CUES   Retro Stepper/BIKE      Squat with rotation 2 blue KB   BFR  Blue cuff, 80%, SLR, SL abd, LAQ, HC, squat, HR   Resisted walk 1 10    RDL (SL) Back foot on wall   Step Ups c SC 6in box + SC at waist   Hip stretches on sliders, quad stretch 5' Hip ext on leg press 40#   Irish squat with slow decel 2 10ea Red KB. 12\" box   Split Squat jumps With Green Resistance into genu valgum.  Focus on landing control. Leg Press Ecc Full Range 210# DL, 130SL to fail   Ecc lower to chair with both legs to stand    Lateral step down (dips) 6\"   Glute side walks White at knees   clamshells No band, floating the top leg   Squat to wall 10    Glute med iso's 10 Basketball overhead   EOB alt hip ext c feet off floor 10    Earthquake Front squat  10 Earthquake+2 red kb, wedges under toes- too much anterior loading   Lateral BOSU lunge 10 4in box under foot, cues for posterior chain   Leg press hip ext Slide Lunge 5sec 10 Each leg   1/2 kneeling iso quad 1 10 Each side   Pelvis dissociation at wall 2 10    BOSU bridge with march 2 10    1/2 kneeling hip flexor stretch 30sec 3 bilat   Hip Matrix Randall and ITBand stretching 30 3    Manual Intervention (20348)  Min: 20'      Knee mobs/PROM            Patella Mobs      DN   R quad and patella tendon   STM/IASTM 10  Right quadriceps (VL, RF), patellar tendon   I\   Right Patellar tendon      Right Hip mobs 10     NMR re-education (50523)  Min:   CUES NEEDED   [place]/Biofeedback 10/10      BFR      G. Med activation      Hip Ext full ROM/ G. Activation      Bosu Bal and Prop- G Med      Single leg stance/Balance/Prop      Bosu Retro G. Med act      Prone Hip froggers- sliders/elevated      Lateral hip Slider Therapeutic Activity (20640)  Min:       Ladders      Plyos      Dynamic Balance      Step up with sport cord (blue) Knee drive/layup with slow eccentric lower               Spoke with   regarding the use of Dry Needling      Dry needling manual therapy: consisted on the placement of 6 needles in the following muscles: VMO, ITB, inferior patella. A 40-60 mm needle was inserted, piston, rotated, and coned to produce intramuscular mobilization. These techniques were used to restore functional range of motion, reduce muscle spasm and induce healing in the corresponding musculature.  (95848)  Clean Technique was utilized today while applying Dry needling treatment. The treatment sites where cleaned with 70% solution of  isopropyl alcohol . The PT washed their hands and utilized treatment gloves along with hand  prior to inserting the needles. All needles where removed and discarded in the appropriate sharps container. MD has given verbal and/or written approval for this treatment. Attended low frequency (1-20Hz) electrical stimulation was utilized in conjunction with Dry Needling:  the Estim was manipulated between all above needles for a period of 10 min. at 4-6 volts. The low frequency electrical stimulation was used to help reduce muscle spasm and help to interrupt /[place] the pain cycle. (71505)        Therapeutic Exercise and NMR EXR  [x] (72359) Provided verbal/tactile cueing for activities related to strengthening, flexibility, endurance, ROM for improvements in LE, proximal hip, and core control with self care, mobility, lifting, ambulation. [x] (30941) Provided verbal/tactile cueing for activities related to improving balance, coordination, kinesthetic sense, posture, motor skill, proprioception  to assist with LE, proximal hip, and core control in self care, mobility, lifting, ambulation and eccentric single leg control.      NMR and Therapeutic Activities:    [x] (60887 or 50983) Provided verbal/tactile cueing for activities related to improving balance, coordination, kinesthetic sense, posture, motor skill, proprioception and motor activation to allow for proper function of core, proximal hip and LE with self care and ADLs and functional mobility.   [] (21439) Gait Re-education- Provided training and instruction to the patient for proper LE, core and proximal hip recruitment and positioning and eccentric body weight control with ambulation re-education including up and down stairs     Home Exercise Program:    [x] (11210) Reviewed/Progressed HEP activities related to strengthening, flexibility, endurance, ROM of core, proximal hip and LE for functional self-care, mobility, lifting and ambulation/stair navigation   [] (59862)Reviewed/Progressed HEP activities related to improving balance, coordination, kinesthetic sense, posture, motor skill, proprioception of core, proximal hip and LE for self care, mobility, lifting, and ambulation/stair navigation      Manual Treatments:  PROM / STM / Oscillations-Mobs:  G-I, II, III, IV (PA's, Inf., Post.)  [x] (17650) Provided manual therapy to mobilize LE, proximal hip and/or LS spine soft tissue/joints for the purpose of modulating pain, promoting relaxation,  increasing ROM, reducing/eliminating soft tissue swelling/inflammation/restriction, improving soft tissue extensibility and allowing for proper ROM for normal function with self care, mobility, lifting and ambulation. Modalities:  ice wrapped on knees to go. [] GAME READY (VASO)- for significant edema, swelling, pain control. Charges:  Timed Code Treatment Minutes: 40   Total Treatment Minutes: 40      [] EVAL (LOW) 42564 (typically 20 minutes face-to-face)  [] EVAL (MOD) 49067 (typically 30 minutes face-to-face)  [] EVAL (HIGH) 16571 (typically 45 minutes face-to-face)  [] RE-EVAL      [x] NP(82385) x   1  [] DRY NEEDLE 1 OR 2 MUSCLES  [] NMR (64279) x     [] DRY NEEDLE 3+ MUSCLES  [x] Manual (29423) x  1    [] TA (13468) x     [] Mech Traction (60630)  [] ES(attended) (14032)     [] ES (un) (51185):   [] VASO (40197)  [] Other:    If [facility] Please Indicate Time In/Out  CPT Code Time in Time out                                   GOALS:  Patient stated goal: Return to pain free sport activities. [x] Progressing: [] Met: [] Not Met: [] Adjusted    Therapist goals for Patient:   Short Term Goals: To be achieved in: 2 weeks  1. Independent in HEP and progression per patient tolerance, in order to prevent re-injury. [] Progressing: [x] Met: [] Not Met: [] Adjusted  2.  Patient will have a decrease in pain to facilitate improvement in movement, Progression has been slowed due to co-morbidities. [] Plan just implemented, too soon to assess goals progression <30days   [] Goals require adjustment due to lack of progress  [] Patient is not progressing as expected and requires additional follow up with physician  [] Other    Prognosis for POC: [x] Good [] Fair  [] Poor    Patient requires continued skilled intervention: [x] Yes  [] No        PLAN:    [x] Continue per plan of care [] Alter current plan (see comments)  [] Plan of care initiated [] Hold pending MD visit [] Discharge    Electronically signed by:     Danielito Guerrero PT      Note: If patient does not return for scheduled/recommended follow up visits, this note will serve as a discharge from care along with the most recent update on progress.

## 2022-12-15 ENCOUNTER — HOSPITAL ENCOUNTER (OUTPATIENT)
Dept: PHYSICAL THERAPY | Age: 22
Setting detail: THERAPIES SERIES
Discharge: HOME | End: 2022-12-15
Payer: COMMERCIAL

## 2022-12-15 PROCEDURE — 97110 THERAPEUTIC EXERCISES: CPT

## 2022-12-15 PROCEDURE — 97140 MANUAL THERAPY 1/> REGIONS: CPT

## 2023-01-11 ENCOUNTER — HOSPITAL ENCOUNTER (OUTPATIENT)
Dept: PHYSICAL THERAPY | Age: 23
Setting detail: THERAPIES SERIES
Discharge: HOME | End: 2023-01-11
Payer: COMMERCIAL

## 2023-01-11 PROCEDURE — 97140 MANUAL THERAPY 1/> REGIONS: CPT

## 2023-01-11 PROCEDURE — 97110 THERAPEUTIC EXERCISES: CPT

## 2023-01-11 NOTE — FLOWSHEET NOTE
[place] [address] [facility]  Phone: [phone] Fax: [phone]               Date:  2023    Patient Name:  Mellisa Vickers    :  2000  MRN: [id number]  Restrictions/Precautions:    Medical/Treatment Diagnosis Information:  Diagnosis: right patellar tendonitis, left quad tendon partial tear  Treatment Diagnosis: M25.561, E68.177  Insurance/Certification information:  PT Insurance Information: BCBS/AG/[place]  Physician Information:  Referring Practitioner: Guy Jackson MD  Plan of care signed (Y/N):     Date of Patient follow up with Physician:      Progress Report: []  Yes  [x]  No     Date Range for reporting period:  Beginnin2022  Endin2022    Progress report due (10 Rx/or 30 days whichever is less):      Recertification due (POC duration/ or 90 days whichever is less):      Visit # Insurance Allowable Auth Needed   24 60/MU []Yes   [x]No     Latex Allergy:  [x]NO      []YES  Preferred Language for Healthcare:   [x]English       []other:  Functional Scale: LEFS, 50% Date assessed: 2022    Pain level:  Rest- 0/10     SUBJECTIVE:  Patient reports hip and knee are a bit tight today but overall is feeling ok. Volume of basketball is high, so she has some normal increase in muscle soreness. OBJECTIVE:   Observation: tight through right anterior hip  Test measurements:    RESTRICTIONS/PRECAUTIONS: none    Exercises/Interventions:     Therapeutic Ex (93061)   Min: 20 Sets/sec Reps Notes/CUES   Retro Stepper/BIKE      Squat with rotation 2 blue KB   BFR  Blue cuff, 80%, SLR, SL abd, LAQ, HC, squat, HR   Resisted walk 1 10    RDL (SL) Back foot on wall   Step Ups c SC 6in box + SC at waist   Hip stretches on sliders, quad stretch 5' Hip ext on leg press 40#   Upper sorbian squat with slow decel 2 10ea Red KB. 12\" box   Split Squat jumps With Green Resistance into genu valgum.  Focus on landing control. Leg Press Ecc Full Range 210# DL, 130SL to fail   Ecc lower to chair with both legs to stand    Lateral step down (dips) 6\"   Glute side walks White at knees   clamshells No band, floating the top leg   Squat to wall 10    Glute med iso's 10 Basketball overhead   EOB alt hip ext c feet off floor 10    Earthquake Front squat  10 Earthquake+2 red kb, wedges under toes- too much anterior loading   Lateral BOSU lunge 10 4in box under foot, cues for posterior chain   Leg press hip ext Slide Lunge 5sec 10 Each leg   1/2 kneeling iso quad 1 10 Each side   Pelvis dissociation at wall 2 10    BOSU bridge with march 2 10    1/2 kneeling hip flexor stretch 30sec 3 bilat   Hip Matrix Randall and ITBand stretching 30 3    Manual Intervention (18292)  Min: 20'      Knee mobs/PROM            Patella Mobs      DN   R quad and patella tendon   STM/IASTM 10  Right quadriceps (VL, RF), patellar tendon   I\   Right Patellar tendon      Right Hip mobs 10     NMR re-education (91904)  Min:   CUES NEEDED   [place] citizen/Biofeedback 10/10      BFR      G. Med activation      Hip Ext full ROM/ G. Activation      Bosu Bal and Prop- G Med      Single leg stance/Balance/Prop      Bosu Retro G. Med act      Prone Hip froggers- sliders/elevated      Lateral hip Slider Therapeutic Activity (31881)  Min:       Ladders      Plyos      Dynamic Balance      Step up with sport cord (blue) Knee drive/layup with slow eccentric lower               Spoke with   regarding the use of Dry Needling      Dry needling manual therapy: consisted on the placement of 6 needles in the following muscles: VMO, ITB, inferior patella. A 40-60 mm needle was inserted, piston, rotated, and coned to produce intramuscular mobilization. These techniques were used to restore functional range of motion, reduce muscle spasm and induce healing in the corresponding musculature.  (81822)  Clean Technique was utilized today while applying Dry needling treatment. The treatment sites where cleaned with 70% solution of  isopropyl alcohol . The PT washed their hands and utilized treatment gloves along with hand  prior to inserting the needles. All needles where removed and discarded in the appropriate sharps container. MD has given verbal and/or written approval for this treatment. Attended low frequency (1-20Hz) electrical stimulation was utilized in conjunction with Dry Needling:  the Estim was manipulated between all above needles for a period of 10 min. at 4-6 volts. The low frequency electrical stimulation was used to help reduce muscle spasm and help to interrupt /[place] the pain cycle. (20269)        Therapeutic Exercise and NMR EXR  [x] (02235) Provided verbal/tactile cueing for activities related to strengthening, flexibility, endurance, ROM for improvements in LE, proximal hip, and core control with self care, mobility, lifting, ambulation. [x] (62587) Provided verbal/tactile cueing for activities related to improving balance, coordination, kinesthetic sense, posture, motor skill, proprioception  to assist with LE, proximal hip, and core control in self care, mobility, lifting, ambulation and eccentric single leg control.      NMR and Therapeutic Activities:    [x] (55849 or 34647) Provided verbal/tactile cueing for activities related to improving balance, coordination, kinesthetic sense, posture, motor skill, proprioception and motor activation to allow for proper function of core, proximal hip and LE with self care and ADLs and functional mobility.   [] (57103) Gait Re-education- Provided training and instruction to the patient for proper LE, core and proximal hip recruitment and positioning and eccentric body weight control with ambulation re-education including up and down stairs     Home Exercise Program:    [x] (41693) Reviewed/Progressed HEP activities related to strengthening, flexibility, endurance, ROM of core, proximal hip and LE for functional self-care, mobility, lifting and ambulation/stair navigation   [] (43562)Reviewed/Progressed HEP activities related to improving balance, coordination, kinesthetic sense, posture, motor skill, proprioception of core, proximal hip and LE for self care, mobility, lifting, and ambulation/stair navigation      Manual Treatments:  PROM / STM / Oscillations-Mobs:  G-I, II, III, IV (PA's, Inf., Post.)  [x] (67132) Provided manual therapy to mobilize LE, proximal hip and/or LS spine soft tissue/joints for the purpose of modulating pain, promoting relaxation,  increasing ROM, reducing/eliminating soft tissue swelling/inflammation/restriction, improving soft tissue extensibility and allowing for proper ROM for normal function with self care, mobility, lifting and ambulation. Modalities:  ice wrapped on knees to go. [] GAME READY (VASO)- for significant edema, swelling, pain control. Charges:  Timed Code Treatment Minutes: 40   Total Treatment Minutes: 40      [] EVAL (LOW) 36688 (typically 20 minutes face-to-face)  [] EVAL (MOD) 59995 (typically 30 minutes face-to-face)  [] EVAL (HIGH) 45141 (typically 45 minutes face-to-face)  [] RE-EVAL      [x] JU(98067) x   1  [] DRY NEEDLE 1 OR 2 MUSCLES  [] NMR (83903) x     [] DRY NEEDLE 3+ MUSCLES  [x] Manual (72418) x  1    [] TA (77927) x     [] Mech Traction (12560)  [] ES(attended) (64573)     [] ES (un) (31636):   [] VASO (09302)  [] Other:    If [facility] Please Indicate Time In/Out  CPT Code Time in Time out                                   GOALS:  Patient stated goal: Return to pain free sport activities. [x] Progressing: [] Met: [] Not Met: [] Adjusted    Therapist goals for Patient:   Short Term Goals: To be achieved in: 2 weeks  1. Independent in HEP and progression per patient tolerance, in order to prevent re-injury. [] Progressing: [x] Met: [] Not Met: [] Adjusted  2.  Patient will have a decrease in pain to facilitate improvement in movement, function, and ADLs as indicated by Functional Deficits. [] Progressing: [x] Met: [] Not Met: [] Adjusted    Long Term Goals: To be achieved in: 8 weeks  1. Disability index score of 10% or less for the LEFS to assist with reaching prior level of function. [] Progressing: [x] Met: [] Not Met: [] Adjusted  2. Patient will demonstrate increased AROM for hip extension during functional length assessment Toña Walker Test) allow for proper joint functioning as indicated by patients Functional Deficits. [] Progressing: [x] Met: [] Not Met: [] Adjusted  3. Patient will demonstrate an increase in Strength to good proximal hip strength and control, within 5lb HHD in LE to allow for proper functional mobility as indicated by patients Functional Deficits. [] Progressing: [x] Met: [] Not Met: [] Adjusted  4. Patient will return to Jumping/Landing/Pivoting functional activities without increased symptoms or restriction. [x] Progressing: [] Met: [] Not Met: [] Adjusted       ASSESSMENT:  Patient is getting through season with some soreness at times worse than others. Requires some soft tissue work to get through increased volume. Hip motion and pelvic dissociation improving. Return to Play: (if applicable)   []  Stage 1: Intro to Strength   []  Stage 2: Return to Run and Strength   []  Stage 3: Return to Jump and Strength   []  Stage 4: Dynamic Strength and Agility   []  Stage 5: Sport Specific Training     []  Ready to Return to Play, Meets All Above Stages   []  Not Ready for Return to Sports   Comments:            Treatment/Activity Tolerance:  [x] Patient tolerated treatment well [] Patient limited by fatique  [] Patient limited by pain  [] Patient limited by other medical complications  [] Other:     Overall Progression Towards Functional goals/ Treatment Progress Update:  [x] Patient is progressing as expected towards functional goals listed. [] Progression is slowed due to complexities/Impairments listed.   [] Progression has been slowed due to co-morbidities. [] Plan just implemented, too soon to assess goals progression <30days   [] Goals require adjustment due to lack of progress  [] Patient is not progressing as expected and requires additional follow up with physician  [] Other    Prognosis for POC: [x] Good [] Fair  [] Poor    Patient requires continued skilled intervention: [x] Yes  [] No        PLAN:    [x] Continue per plan of care [] Alter current plan (see comments)  [] Plan of care initiated [] Hold pending MD visit [] Discharge    Electronically signed by:     Xiomara Snyder PT      Note: If patient does not return for scheduled/recommended follow up visits, this note will serve as a discharge from care along with the most recent update on progress.

## 2023-01-12 ENCOUNTER — APPOINTMENT (OUTPATIENT)
Dept: PHYSICAL THERAPY | Age: 23
End: 2023-01-12
Payer: COMMERCIAL

## 2023-01-20 ENCOUNTER — HOSPITAL ENCOUNTER (OUTPATIENT)
Dept: PHYSICAL THERAPY | Age: 23
Setting detail: THERAPIES SERIES
Discharge: HOME | End: 2023-01-20
Payer: COMMERCIAL

## 2023-01-20 PROCEDURE — 97110 THERAPEUTIC EXERCISES: CPT

## 2023-01-20 PROCEDURE — 97140 MANUAL THERAPY 1/> REGIONS: CPT

## 2023-01-20 NOTE — FLOWSHEET NOTE
[place] [address] [address]  Phone: [phone] Fax: [phone]               Date:  2023    Patient Name:  Ruth Ann Soni    :  2000  MRN: [id number]  Restrictions/Precautions:    Medical/Treatment Diagnosis Information:  Diagnosis: right patellar tendonitis, left quad tendon partial tear  Treatment Diagnosis: M25.561, F08.647  Insurance/Certification information:  PT Insurance Information: BCBS/AG/[place]  Physician Information:  Referring Practitioner: Jorge Sullivan MD  Plan of care signed (Y/N):     Date of Patient follow up with Physician:      Progress Report: []  Yes  [x]  No     Date Range for reporting period:  Beginnin2022  Endin2022    Progress report due (10 Rx/or 30 days whichever is less):      Recertification due (POC duration/ or 90 days whichever is less): [id number]     Visit # Insurance Allowable Auth Needed   25 60/MU []Yes   [x]No     Latex Allergy:  [x]NO      []YES  Preferred Language for Healthcare:   [x]English       []other:  Functional Scale: LEFS, 50% Date assessed: 2022    Pain level:  Rest- 0/10     SUBJECTIVE:  Patient reports overall feeling ok with doing exercises in the training and weight rooms. Today feels a bit tight through adductors. OBJECTIVE:   Observation: tight through right adductor  Test measurements:    RESTRICTIONS/PRECAUTIONS: none    Exercises/Interventions:     Therapeutic Ex (51845)   Min: 15 Sets/sec Reps Notes/CUES   Retro Stepper/BIKE      Squat with rotation 2 blue KB   BFR  Blue cuff, 80%, SLR, SL abd, LAQ, HC, squat, HR   Resisted walk    RDL (SL) Back foot on wall   Step Ups c SC 6in box + SC at waist   Hip stretches on sliders, quad stretch 5' Hip ext on leg press 40#   Nepali squat with slow decel Red KB. 12\" box   Split Squat jumps With Green Resistance into genu valgum. Focus on landing control.    Leg Press Ecc Full Range 210# DL, 130SL to fail   Ecc lower to chair with both legs to stand    Lateral step down (dips) 6\"   Glute side walks White at knees   clamshells No band, floating the top leg   Squat to wall 10    Glute med iso's 10 Basketball overhead   EOB alt hip ext c feet off floor 10    Earthquake Front squat  10 Earthquake+2 red kb, wedges under toes- too much anterior loading   Lateral BOSU lunge 10 4in box under foot, cues for posterior chain   Leg press hip ext Slide Lunge Each leg   1/2 kneeling iso quad 2 10 Each side   Pelvis dissociation at wall 2 10    Slider stretches 5     1/2 kneeling hip flexor stretch 30sec 3 bilat   Hip Matrix Randall and ITBand stretching 30 3    Manual Intervention (50912)  Min: 20'      Knee mobs/PROM            Patella Mobs      DN   R quad and patella tendon   STM/IASTM 10  Right quadriceps (VL, RF), patellar tendon   I\   Right Patellar tendon      Right Hip mobs 10     NMR re-education (51169)  Min:   CUES NEEDED   [place]/Biofeedback 10/10      BFR      G. Med activation      Hip Ext full ROM/ G. Activation      Bosu Bal and Prop- G Med      Single leg stance/Balance/Prop      Bosu Retro G. Med act      Prone Hip froggers- sliders/elevated      Lateral hip Slider Therapeutic Activity (27258)  Min:       Ladders      Plyos      Dynamic Balance      Step up with sport cord (blue) Knee drive/layup with slow eccentric lower               Spoke with   regarding the use of Dry Needling      Dry needling manual therapy: consisted on the placement of 6 needles in the following muscles: VMO, ITB, inferior patella. A 40-60 mm needle was inserted, piston, rotated, and coned to produce intramuscular mobilization. These techniques were used to restore functional range of motion, reduce muscle spasm and induce healing in the corresponding musculature. (01637)  Clean Technique was utilized today while applying Dry needling treatment.   The treatment sites where cleaned with 70% solution of  isopropyl alcohol . The PT washed their hands and utilized treatment gloves along with hand  prior to inserting the needles. All needles where removed and discarded in the appropriate sharps container. MD has given verbal and/or written approval for this treatment. Attended low frequency (1-20Hz) electrical stimulation was utilized in conjunction with Dry Needling:  the Estim was manipulated between all above needles for a period of 10 min. at 4-6 volts. The low frequency electrical stimulation was used to help reduce muscle spasm and help to interrupt /[place] the pain cycle. (33212)        Therapeutic Exercise and NMR EXR  [x] (77441) Provided verbal/tactile cueing for activities related to strengthening, flexibility, endurance, ROM for improvements in LE, proximal hip, and core control with self care, mobility, lifting, ambulation. [x] (44039) Provided verbal/tactile cueing for activities related to improving balance, coordination, kinesthetic sense, posture, motor skill, proprioception  to assist with LE, proximal hip, and core control in self care, mobility, lifting, ambulation and eccentric single leg control.      NMR and Therapeutic Activities:    [x] (09342 or 21645) Provided verbal/tactile cueing for activities related to improving balance, coordination, kinesthetic sense, posture, motor skill, proprioception and motor activation to allow for proper function of core, proximal hip and LE with self care and ADLs and functional mobility.   [] (81451) Gait Re-education- Provided training and instruction to the patient for proper LE, core and proximal hip recruitment and positioning and eccentric body weight control with ambulation re-education including up and down stairs     Home Exercise Program:    [x] (62013) Reviewed/Progressed HEP activities related to strengthening, flexibility, endurance, ROM of core, proximal hip and LE for functional self-care, mobility, lifting and ambulation/stair navigation   [] (56342)Reviewed/Progressed HEP activities related to improving balance, coordination, kinesthetic sense, posture, motor skill, proprioception of core, proximal hip and LE for self care, mobility, lifting, and ambulation/stair navigation      Manual Treatments:  PROM / STM / Oscillations-Mobs:  G-I, II, III, IV (PA's, Inf., Post.)  [x] (80875) Provided manual therapy to mobilize LE, proximal hip and/or LS spine soft tissue/joints for the purpose of modulating pain, promoting relaxation,  increasing ROM, reducing/eliminating soft tissue swelling/inflammation/restriction, improving soft tissue extensibility and allowing for proper ROM for normal function with self care, mobility, lifting and ambulation. Modalities:  ice wrapped on knees to go. [] GAME READY (VASO)- for significant edema, swelling, pain control. Charges:  Timed Code Treatment Minutes: 30   Total Treatment Minutes: 30      [] EVAL (LOW) 41014 (typically 20 minutes face-to-face)  [] EVAL (MOD) 74282 (typically 30 minutes face-to-face)  [] EVAL (HIGH) 79703 (typically 45 minutes face-to-face)  [] RE-EVAL      [x] XI(16507) x   1  [] DRY NEEDLE 1 OR 2 MUSCLES  [] NMR (81195) x     [] DRY NEEDLE 3+ MUSCLES  [x] Manual (70003) x  1    [] TA (94102) x     [] Mech Traction (37716)  [] ES(attended) (58722)     [] ES (un) (92957):   [] VASO (18287)  [] Other:    If [facility] Please Indicate Time In/Out  CPT Code Time in Time out                                   GOALS:  Patient stated goal: Return to pain free sport activities. [x] Progressing: [] Met: [] Not Met: [] Adjusted    Therapist goals for Patient:   Short Term Goals: To be achieved in: 2 weeks  1. Independent in HEP and progression per patient tolerance, in order to prevent re-injury. [] Progressing: [x] Met: [] Not Met: [] Adjusted  2.  Patient will have a decrease in pain to facilitate improvement in movement, function, and ADLs as indicated by Functional Deficits.  [] Progressing: [x] Met: [] Not Met: [] Adjusted    Long Term Goals: To be achieved in: 8 weeks  1. Disability index score of 10% or less for the LEFS to assist with reaching prior level of function.   [] Progressing: [x] Met: [] Not Met: [] Adjusted  2. Patient will demonstrate increased AROM for hip extension during functional length assessment (Randall Test) allow for proper joint functioning as indicated by patients Functional Deficits.   [] Progressing: [x] Met: [] Not Met: [] Adjusted  3. Patient will demonstrate an increase in Strength to good proximal hip strength and control, within 5lb HHD in LE to allow for proper functional mobility as indicated by patients Functional Deficits.   [] Progressing: [x] Met: [] Not Met: [] Adjusted  4. Patient will return to Jumping/Landing/Pivoting functional activities without increased symptoms or restriction.   [x] Progressing: [] Met: [] Not Met: [] Adjusted       ASSESSMENT:  Patient is managing symptoms well through season. She has some increased adductor tightness which may be result of getting improved pelvic dissociation.     Return to Play: (if applicable)   []  Stage 1: Intro to Strength   []  Stage 2: Return to Run and Strength   []  Stage 3: Return to Jump and Strength   []  Stage 4: Dynamic Strength and Agility   []  Stage 5: Sport Specific Training     []  Ready to Return to Play, Meets All Above Stages   []  Not Ready for Return to Sports   Comments:            Treatment/Activity Tolerance:  [x] Patient tolerated treatment well [] Patient limited by fatique  [] Patient limited by pain  [] Patient limited by other medical complications  [] Other:     Overall Progression Towards Functional goals/ Treatment Progress Update:  [x] Patient is progressing as expected towards functional goals listed.    [] Progression is slowed due to complexities/Impairments listed.  [] Progression has been slowed due to co-morbidities.  [] Plan just implemented, too soon  to assess goals progression <30days   [] Goals require adjustment due to lack of progress  [] Patient is not progressing as expected and requires additional follow up with physician  [] Other    Prognosis for POC: [x] Good [] Fair  [] Poor    Patient requires continued skilled intervention: [x] Yes  [] No        PLAN:    [x] Continue per plan of care [] Alter current plan (see comments)  [] Plan of care initiated [] Hold pending MD visit [] Discharge    Electronically signed by:     Radha Feldman PT      Note: If patient does not return for scheduled/recommended follow up visits, this note will serve as a discharge from care along with the most recent update on progress.

## 2023-01-27 ENCOUNTER — HOSPITAL ENCOUNTER (OUTPATIENT)
Dept: PHYSICAL THERAPY | Age: 23
Setting detail: THERAPIES SERIES
Discharge: HOME | End: 2023-01-27
Payer: COMMERCIAL

## 2023-01-27 PROCEDURE — 97032 APPL MODALITY 1+ESTIM EA 15: CPT

## 2023-01-27 PROCEDURE — 20560 NDL INSJ W/O NJX 1 OR 2 MUSC: CPT

## 2023-01-27 PROCEDURE — 97140 MANUAL THERAPY 1/> REGIONS: CPT

## 2023-01-27 NOTE — FLOWSHEET NOTE
[place] [address] [facility]  Phone: [phone] Fax: [phone]               Date:  2023    Patient Name:  Katharina Faith    :  2000  MRN: [id number]  Restrictions/Precautions:    Medical/Treatment Diagnosis Information:  Diagnosis: right patellar tendonitis, left quad tendon partial tear  Treatment Diagnosis: M25.561, V46.114  Insurance/Certification information:  PT Insurance Information: BCBS/AG/[place]  Physician Information:  Referring Practitioner: Selina Rome MD  Plan of care signed (Y/N):     Date of Patient follow up with Physician:      Progress Report: []  Yes  [x]  No     Date Range for reporting period:  Beginnin2022  Endin2022    Progress report due (10 Rx/or 30 days whichever is less):      Recertification due (POC duration/ or 90 days whichever is less): [id number]     Visit # Insurance Allowable Auth Needed   26 60/MU []Yes   [x]No     Latex Allergy:  [x]NO      []YES  Preferred Language for Healthcare:   [x]English       []other:  Functional Scale: LEFS, 50% Date assessed: 2022    Pain level:  Rest- 0/10     SUBJECTIVE:  Patient states right hip feels tight today. OBJECTIVE:   Observation: tight and tender through right glutes  Test measurements:    RESTRICTIONS/PRECAUTIONS: none    Exercises/Interventions:     Therapeutic Ex (15441)   Min: 10 Sets/sec Reps Notes/CUES   Retro Stepper/BIKE      Squat with rotation 2 blue KB   BFR  Blue cuff, 80%, SLR, SL abd, LAQ, HC, squat, HR   Resisted walk    RDL (SL) Back foot on wall   Step Ups c SC 6in box + SC at waist   Hip stretches on sliders, quad stretch 5' Hip ext on leg press 40#   Czech squat with slow decel Red KB. 12\" box   Split Squat jumps With Green Resistance into genu valgum. Focus on landing control.    Leg Press Ecc Full Range 210# DL, 130SL to fail   Ecc lower to chair with both legs to stand    Lateral step down (dips) 6\"   Glute side walks White at knees   clamshells No band, floating the top leg   Squat to wall 10    Glute med iso's 10 Basketball overhead   EOB alt hip ext c feet off floor 10    Earthquake Front squat  10 Earthquake+2 red kb, wedges under toes- too much anterior loading   Lateral BOSU lunge 10 4in box under foot, cues for posterior chain   Leg press hip ext Slide Lunge Each leg   1/2 kneeling iso quad 2 10 Each side   Pelvis dissociation at wall 2 10    Slider stretches 5     1/2 kneeling hip flexor stretch 30sec 3 bilat   Hip Matrix Randall and ITBand stretching 30 3    Manual Intervention (79968)  Min: 20'      Knee mobs/PROM            Patella Mobs      DN 15'  Right glute   STM/IASTM 10  Right quadriceps (VL, RF), patellar tendon   I\   Right Patellar tendon      Right Hip mobs 10     NMR re-education (75287)  Min:   CUES NEEDED   [place]/Biofeedback 10/10      BFR      G. Med activation      Hip Ext full ROM/ G. Activation      Bosu Bal and Prop- G Med      Single leg stance/Balance/Prop      Bosu Retro G. Med act      Prone Hip froggers- sliders/elevated      Lateral hip Slider Therapeutic Activity (96946)  Min:       Ladders      Plyos      Dynamic Balance      Step up with sport cord (blue) Knee drive/layup with slow eccentric lower               Spoke with   regarding the use of Dry Needling      Dry needling manual therapy: consisted on the placement of 3 needles in the following muscles: right glute max. A 75 mm needle was inserted, piston, rotated, and coned to produce intramuscular mobilization. These techniques were used to restore functional range of motion, reduce muscle spasm and induce healing in the corresponding musculature. (79033)  Clean Technique was utilized today while applying Dry needling treatment. The treatment sites where cleaned with 70% solution of  isopropyl alcohol .   The PT washed their hands and utilized treatment gloves along with hand  prior to inserting the needles. All needles where removed and discarded in the appropriate sharps container. MD has given verbal and/or written approval for this treatment. Attended low frequency (1-20Hz) electrical stimulation was utilized in conjunction with Dry Needling:  the Estim was manipulated between all above needles for a period of 10 min. at 4-6 volts. The low frequency electrical stimulation was used to help reduce muscle spasm and help to interrupt /[place] the pain cycle. (71500)        Therapeutic Exercise and NMR EXR  [x] (58095) Provided verbal/tactile cueing for activities related to strengthening, flexibility, endurance, ROM for improvements in LE, proximal hip, and core control with self care, mobility, lifting, ambulation. [x] (72396) Provided verbal/tactile cueing for activities related to improving balance, coordination, kinesthetic sense, posture, motor skill, proprioception  to assist with LE, proximal hip, and core control in self care, mobility, lifting, ambulation and eccentric single leg control.      NMR and Therapeutic Activities:    [x] (82662 or 76541) Provided verbal/tactile cueing for activities related to improving balance, coordination, kinesthetic sense, posture, motor skill, proprioception and motor activation to allow for proper function of core, proximal hip and LE with self care and ADLs and functional mobility.   [] (88286) Gait Re-education- Provided training and instruction to the patient for proper LE, core and proximal hip recruitment and positioning and eccentric body weight control with ambulation re-education including up and down stairs     Home Exercise Program:    [x] (29728) Reviewed/Progressed HEP activities related to strengthening, flexibility, endurance, ROM of core, proximal hip and LE for functional self-care, mobility, lifting and ambulation/stair navigation   [] (63105)Reviewed/Progressed HEP activities related to improving balance, coordination, kinesthetic sense, posture, motor skill, proprioception of core, proximal hip and LE for self care, mobility, lifting, and ambulation/stair navigation      Manual Treatments:  PROM / STM / Oscillations-Mobs:  G-I, II, III, IV (PA's, Inf., Post.)  [x] (31312) Provided manual therapy to mobilize LE, proximal hip and/or LS spine soft tissue/joints for the purpose of modulating pain, promoting relaxation,  increasing ROM, reducing/eliminating soft tissue swelling/inflammation/restriction, improving soft tissue extensibility and allowing for proper ROM for normal function with self care, mobility, lifting and ambulation. Modalities:  ice wrapped on knees to go. [] GAME READY (VASO)- for significant edema, swelling, pain control. Charges:  Timed Code Treatment Minutes: 30   Total Treatment Minutes: 30      [] EVAL (LOW) 52458 (typically 20 minutes face-to-face)  [] EVAL (MOD) 41215 (typically 30 minutes face-to-face)  [] EVAL (HIGH) 78196 (typically 45 minutes face-to-face)  [] RE-EVAL      [] IL(70728) x     [x] DRY NEEDLE 1 OR 2 MUSCLES  [] NMR (19312) x     [] DRY NEEDLE 3+ MUSCLES  [x] Manual (55580) x  1    [] TA (32925) x     [] Mech Traction (76630)  [x] ES(attended) (06623)     [] ES (un) (51435):   [] VASO (43073)  [] Other:    If [facility] Please Indicate Time In/Out  CPT Code Time in Time out                                   GOALS:  Patient stated goal: Return to pain free sport activities. [x] Progressing: [] Met: [] Not Met: [] Adjusted    Therapist goals for Patient:   Short Term Goals: To be achieved in: 2 weeks  1. Independent in HEP and progression per patient tolerance, in order to prevent re-injury. [] Progressing: [x] Met: [] Not Met: [] Adjusted  2. Patient will have a decrease in pain to facilitate improvement in movement, function, and ADLs as indicated by Functional Deficits. [] Progressing: [x] Met: [] Not Met: [] Adjusted    Long Term Goals: To be achieved in: 8 weeks  1.  Disability index score of 10% or less for the LEFS to assist with reaching prior level of function. [] Progressing: [x] Met: [] Not Met: [] Adjusted  2. Patient will demonstrate increased AROM for hip extension during functional length assessment Siobhan Promise Test) allow for proper joint functioning as indicated by patients Functional Deficits. [] Progressing: [x] Met: [] Not Met: [] Adjusted  3. Patient will demonstrate an increase in Strength to good proximal hip strength and control, within 5lb HHD in LE to allow for proper functional mobility as indicated by patients Functional Deficits. [] Progressing: [x] Met: [] Not Met: [] Adjusted  4. Patient will return to Jumping/Landing/Pivoting functional activities without increased symptoms or restriction. [x] Progressing: [] Met: [] Not Met: [] Adjusted       ASSESSMENT:  Patient continues to have days of flare up of right hip tightness. Responds well to DN and stretching. Return to Play: (if applicable)   []  Stage 1: Intro to Strength   []  Stage 2: Return to Run and Strength   []  Stage 3: Return to Jump and Strength   []  Stage 4: Dynamic Strength and Agility   []  Stage 5: Sport Specific Training     []  Ready to Return to Play, Meets All Above Stages   []  Not Ready for Return to Sports   Comments:            Treatment/Activity Tolerance:  [x] Patient tolerated treatment well [] Patient limited by fatique  [] Patient limited by pain  [] Patient limited by other medical complications  [] Other:     Overall Progression Towards Functional goals/ Treatment Progress Update:  [x] Patient is progressing as expected towards functional goals listed. [] Progression is slowed due to complexities/Impairments listed. [] Progression has been slowed due to co-morbidities.   [] Plan just implemented, too soon to assess goals progression <30days   [] Goals require adjustment due to lack of progress  [] Patient is not progressing as expected and requires additional follow up with physician  [] Other    Prognosis for POC: [x] Good [] Fair  [] Poor    Patient requires continued skilled intervention: [x] Yes  [] No        PLAN:    [x] Continue per plan of care [] Alter current plan (see comments)  [] Plan of care initiated [] Hold pending MD visit [] Discharge    Electronically signed by:     Elver Eddy PT      Note: If patient does not return for scheduled/recommended follow up visits, this note will serve as a discharge from care along with the most recent update on progress.

## 2023-02-03 ENCOUNTER — APPOINTMENT (OUTPATIENT)
Dept: PHYSICAL THERAPY | Age: 23
End: 2023-02-03
Payer: COMMERCIAL

## 2023-02-06 ENCOUNTER — HOSPITAL ENCOUNTER (OUTPATIENT)
Dept: PHYSICAL THERAPY | Age: 23
Setting detail: THERAPIES SERIES
Discharge: HOME | End: 2023-02-06
Payer: COMMERCIAL

## 2023-02-06 PROCEDURE — 20560 NDL INSJ W/O NJX 1 OR 2 MUSC: CPT

## 2023-02-06 PROCEDURE — 97140 MANUAL THERAPY 1/> REGIONS: CPT

## 2023-02-06 PROCEDURE — 97110 THERAPEUTIC EXERCISES: CPT

## 2023-02-06 PROCEDURE — 97032 APPL MODALITY 1+ESTIM EA 15: CPT

## 2023-02-06 NOTE — FLOWSHEET NOTE
[address] [address] [address]  Phone: [phone] Fax: [phone]               Date:  2023    Patient Name:  Victor M Odell    :  2000  MRN: [id number]  Restrictions/Precautions:    Medical/Treatment Diagnosis Information:  Diagnosis: right patellar tendonitis, left quad tendon partial tear  Treatment Diagnosis: M25.561, B68.077  Insurance/Certification information:  PT Insurance Information: BCBS/AG/[place]  Physician Information:  Referring Practitioner: Louise Marquez MD  Plan of care signed (Y/N):     Date of Patient follow up with Physician:      Progress Report: []  Yes  [x]  No     Date Range for reporting period:  Beginnin2022  Endin2022    Progress report due (10 Rx/or 30 days whichever is less):      Recertification due (POC duration/ or 90 days whichever is less):      Visit # Insurance Allowable Auth Needed   27 60/MU []Yes   [x]No     Latex Allergy:  [x]NO      []YES  Preferred Language for Healthcare:   [x]English       []other:  Functional Scale: LEFS, 50% Date assessed: 2022    Pain level:  Rest- 0/10     SUBJECTIVE:  Patient states that she would like to have DN today to right knee. Feels that she is very tight and sore through patellar tendon. OBJECTIVE:   Observation: tight and tender through right glutes, right lateral patellar tendon  Test measurements:    RESTRICTIONS/PRECAUTIONS: none    Exercises/Interventions:     Therapeutic Ex (85671)   Min: 10 Sets/sec Reps Notes/CUES   Retro Stepper/BIKE      Squat with rotation 2 blue KB   BFR  Blue cuff, 80%, SLR, SL abd, LAQ, HC, squat, HR   Resisted walk    RDL (SL) Back foot on wall   Step Ups c SC 6in box + SC at waist   Hip stretches on sliders, quad stretch 5' Hip ext on leg press 40#   [place] squat with slow decel Red KB. 12\" box   Split Squat jumps With Green Resistance into genu valgum.  Focus on landing control. Leg Press Ecc Full Range 210# DL, 130SL to fail   Ecc lower to chair with both legs to stand    Lateral step down (dips) 6\"   Glute side walks White at knees   clamshells No band, floating the top leg   Squat to wall 10    Glute med iso's 10 Basketball overhead   EOB alt hip ext c feet off floor 10    Earthquake Front squat  10 Earthquake+2 red kb, wedges under toes- too much anterior loading   Lateral BOSU lunge 10 4in box under foot, cues for posterior chain   Leg press hip ext Slide Lunge Each leg   1/2 kneeling iso quad 2 10 Each side   Pelvis dissociation at wall 2 10    Slider stretches 5     1/2 kneeling hip flexor stretch 30sec 3 bilat   Hip Matrix Randall and ITBand stretching 30 3    Manual Intervention (27809)  Min: 20'      Knee mobs/PROM            Patella Mobs      DN 15'  Right glute   STM/IASTM 10  Right quadriceps (VL, RF), patellar tendon   I\   Right Patellar tendon      Right Hip mobs 10     NMR re-education (97298)  Min:   CUES NEEDED   [place]/Biofeedback 10/10      BFR      G. Med activation      Hip Ext full ROM/ G. Activation      Bosu Bal and Prop- G Med      Single leg stance/Balance/Prop      Bosu Retro G. Med act      Prone Hip froggers- sliders/elevated      Lateral hip Slider Therapeutic Activity (73751)  Min:       Ladders      Plyos      Dynamic Balance      Step up with sport cord (blue) Knee drive/layup with slow eccentric lower               Spoke with   regarding the use of Dry Needling      Dry needling manual therapy: consisted on the placement of 6 needles in the following muscles: right lateral quad, reji-tendon space patellar tendon. A 30-50 mm needle was inserted, piston, rotated, and coned to produce intramuscular mobilization. These techniques were used to restore functional range of motion, reduce muscle spasm and induce healing in the corresponding musculature.  (25857)  Clean Technique was utilized today while applying Dry needling treatment. The treatment sites where cleaned with 70% solution of  isopropyl alcohol . The PT washed their hands and utilized treatment gloves along with hand  prior to inserting the needles. All needles where removed and discarded in the appropriate sharps container. MD has given verbal and/or written approval for this treatment. Attended low frequency (1-20Hz) electrical stimulation was utilized in conjunction with Dry Needling:  the Estim was manipulated between all above needles for a period of 10 min. at 4-6 volts. The low frequency electrical stimulation was used to help reduce muscle spasm and help to interrupt /[place] the pain cycle. (14356)        Therapeutic Exercise and NMR EXR  [x] (09807) Provided verbal/tactile cueing for activities related to strengthening, flexibility, endurance, ROM for improvements in LE, proximal hip, and core control with self care, mobility, lifting, ambulation. [x] (80827) Provided verbal/tactile cueing for activities related to improving balance, coordination, kinesthetic sense, posture, motor skill, proprioception  to assist with LE, proximal hip, and core control in self care, mobility, lifting, ambulation and eccentric single leg control.      NMR and Therapeutic Activities:    [x] (01564 or 01876) Provided verbal/tactile cueing for activities related to improving balance, coordination, kinesthetic sense, posture, motor skill, proprioception and motor activation to allow for proper function of core, proximal hip and LE with self care and ADLs and functional mobility.   [] (26947) Gait Re-education- Provided training and instruction to the patient for proper LE, core and proximal hip recruitment and positioning and eccentric body weight control with ambulation re-education including up and down stairs     Home Exercise Program:    [x] (24993) Reviewed/Progressed HEP activities related to strengthening, flexibility, endurance, ROM of core, proximal hip and LE for functional self-care, mobility, lifting and ambulation/stair navigation   [] (08153)Reviewed/Progressed HEP activities related to improving balance, coordination, kinesthetic sense, posture, motor skill, proprioception of core, proximal hip and LE for self care, mobility, lifting, and ambulation/stair navigation      Manual Treatments:  PROM / STM / Oscillations-Mobs:  G-I, II, III, IV (PA's, Inf., Post.)  [x] (06118) Provided manual therapy to mobilize LE, proximal hip and/or LS spine soft tissue/joints for the purpose of modulating pain, promoting relaxation,  increasing ROM, reducing/eliminating soft tissue swelling/inflammation/restriction, improving soft tissue extensibility and allowing for proper ROM for normal function with self care, mobility, lifting and ambulation. Modalities:  ice wrapped on knees to go. [] GAME READY (VASO)- for significant edema, swelling, pain control. Charges:  Timed Code Treatment Minutes: 30   Total Treatment Minutes: 30      [] EVAL (LOW) 13796 (typically 20 minutes face-to-face)  [] EVAL (MOD) 94741 (typically 30 minutes face-to-face)  [] EVAL (HIGH) 05434 (typically 45 minutes face-to-face)  [] RE-EVAL      [x] GA(93076) x 1    [x] DRY NEEDLE 1 OR 2 MUSCLES  [] NMR (80074) x     [] DRY NEEDLE 3+ MUSCLES  [x] Manual (92788) x  1    [] TA (77631) x     [] Mech Traction (73049)  [x] ES(attended) (26518)     [] ES (un) (49726):   [] VASO (82470)  [] Other:    If [facility] Please Indicate Time In/Out  CPT Code Time in Time out                                   GOALS:  Patient stated goal: Return to pain free sport activities. [x] Progressing: [] Met: [] Not Met: [] Adjusted    Therapist goals for Patient:   Short Term Goals: To be achieved in: 2 weeks  1. Independent in HEP and progression per patient tolerance, in order to prevent re-injury. [] Progressing: [x] Met: [] Not Met: [] Adjusted  2.  Patient will have a decrease in pain to facilitate improvement in movement, function, and ADLs as indicated by Functional Deficits. [] Progressing: [x] Met: [] Not Met: [] Adjusted    Long Term Goals: To be achieved in: 8 weeks  1. Disability index score of 10% or less for the LEFS to assist with reaching prior level of function. [] Progressing: [x] Met: [] Not Met: [] Adjusted  2. Patient will demonstrate increased AROM for hip extension during functional length assessment Lannette Epifanio Test) allow for proper joint functioning as indicated by patients Functional Deficits. [] Progressing: [x] Met: [] Not Met: [] Adjusted  3. Patient will demonstrate an increase in Strength to good proximal hip strength and control, within 5lb HHD in LE to allow for proper functional mobility as indicated by patients Functional Deficits. [] Progressing: [x] Met: [] Not Met: [] Adjusted  4. Patient will return to Jumping/Landing/Pivoting functional activities without increased symptoms or restriction. [x] Progressing: [] Met: [] Not Met: [] Adjusted       ASSESSMENT:  Patient felt much more loose through right knee. Noted improvement in pelvic dissociation and hip mobility today. Return to Play: (if applicable)   []  Stage 1: Intro to Strength   []  Stage 2: Return to Run and Strength   []  Stage 3: Return to Jump and Strength   []  Stage 4: Dynamic Strength and Agility   []  Stage 5: Sport Specific Training     []  Ready to Return to Play, Meets All Above Stages   []  Not Ready for Return to Sports   Comments:            Treatment/Activity Tolerance:  [x] Patient tolerated treatment well [] Patient limited by fatique  [] Patient limited by pain  [] Patient limited by other medical complications  [] Other:     Overall Progression Towards Functional goals/ Treatment Progress Update:  [x] Patient is progressing as expected towards functional goals listed. [] Progression is slowed due to complexities/Impairments listed. [] Progression has been slowed due to co-morbidities.   [] Plan just implemented, too soon to assess goals progression <30days   [] Goals require adjustment due to lack of progress  [] Patient is not progressing as expected and requires additional follow up with physician  [] Other    Prognosis for POC: [x] Good [] Fair  [] Poor    Patient requires continued skilled intervention: [x] Yes  [] No        PLAN:    [x] Continue per plan of care [] Alter current plan (see comments)  [] Plan of care initiated [] Hold pending MD visit [] Discharge    Electronically signed by:     Jaylon Patterson PT      Note: If patient does not return for scheduled/recommended follow up visits, this note will serve as a discharge from care along with the most recent update on progress.

## 2023-02-10 ENCOUNTER — APPOINTMENT (OUTPATIENT)
Dept: PHYSICAL THERAPY | Age: 23
End: 2023-02-10
Payer: COMMERCIAL

## 2023-02-17 ENCOUNTER — HOSPITAL ENCOUNTER (OUTPATIENT)
Dept: PHYSICAL THERAPY | Age: 23
Setting detail: THERAPIES SERIES
Discharge: HOME | End: 2023-02-17
Payer: COMMERCIAL

## 2023-02-17 PROCEDURE — 97110 THERAPEUTIC EXERCISES: CPT

## 2023-02-17 PROCEDURE — 97140 MANUAL THERAPY 1/> REGIONS: CPT

## 2023-02-17 NOTE — PLAN OF CARE
[facility] [address]  Phone: [phone] Fax: [phone]            Physical Therapy Re-Certification Plan of Rasheed Tang      Dear Dr. Min Moses  ,    We had the pleasure of treating the following patient for physical therapy services at [address] [place] [address]. A summary of our findings can be found in the updated assessment below. This includes our plan of care. If you have any questions or concerns regarding these findings, please do not hesitate to contact me at the office phone number checked above.   Thank you for the referral.     Physician Signature:________________________________Date:__________________  By signing above (or electronic signature), therapists plan is approved by physician    Date Range Of Visits: 3/17/22-23  Total Visits to Date: 29  Overall Response to Treatment:   [x]Patient is responding well to treatment and improvement is noted with regards  to goals   []Patient should continue to improve in reasonable time if they continue HEP   []Patient has plateaued and is no longer responding to skilled PT intervention    []Patient is getting worse and would benefit from return to referring MD   []Patient unable to adhere to initial POC   []Other:       Date:  2023    Patient Name:  Celestina Lara    :  2000  MRN: [id number]  Restrictions/Precautions:    Medical/Treatment Diagnosis Information:  Diagnosis: right patellar tendonitis, left quad tendon partial tear  Treatment Diagnosis: M25.561, P16.879  Insurance/Certification information:  PT Insurance Information: BCBS//[employer]  Physician Information:  Referring Practitioner: Suellen Sandoval MD  Plan of care signed (Y/N):     Date of Patient follow up with Physician:      Progress Report: []  Yes  [x]  No     Date Range for reporting period:  Beginnin2022  Endin2022    Progress report due (10 Rx/or 30 days whichever is less): 51/59/1464     Recertification due (POC duration/ or 90 days whichever is less): 08/303951     Visit # Insurance Allowable Auth Needed   28 60/MU []Yes   [x]No     Latex Allergy:  [x]NO      []YES  Preferred Language for Healthcare:   [x]English       []other:  Functional Scale: LEFS, 50% Date assessed: 03/17/2022    Pain level:  Rest- 0/10     SUBJECTIVE:  Patient reports feeling some pain associated with being at the end of basketball season. Some tightness through patellar tendon however, hip motion seems to be much better. OBJECTIVE:   Observation: tender through lateral patellar tendon on right side  Test measurements:    RESTRICTIONS/PRECAUTIONS: none    Exercises/Interventions:     Therapeutic Ex (29084)   Min: 10 Sets/sec Reps Notes/CUES   Retro Stepper/BIKE      Squat with rotation 2 blue KB   BFR  Blue cuff, 80%, SLR, SL abd, LAQ, HC, squat, HR   Resisted walk    RDL (SL) Back foot on wall   Step Ups c SC 6in box + SC at waist   Hip stretches on sliders, quad stretch 5' Hip ext on leg press 40#   Danish squat with slow decel Red KB. 12\" box   Split Squat jumps With Green Resistance into genu valgum. Focus on landing control.    Leg Press Ecc Full Range 210# DL, 130SL to fail   Ecc lower to chair with both legs to stand    Lateral step down (dips) 6\"   Glute side walks White at knees   clamshells No band, floating the top leg   Squat to wall 10    Glute med iso's 10 Basketball overhead   EOB alt hip ext c feet off floor 10    Earthquake Front squat  10 Earthquake+2 red kb, wedges under toes- too much anterior loading   Lateral BOSU lunge 10 4in box under foot, cues for posterior chain   Leg press hip ext Slide Lunge Each leg   1/2 kneeling iso quad 2 10 Each side   Pelvis dissociation at wall 2 10    Slider stretches 5     1/2 kneeling hip flexor stretch 30sec 3 bilat   Hip Matrix Randall and ITBand stretching 30 3    Manual Intervention (09410)  Min: 20'      Knee mobs/PROM Patella Mobs      DN 15'  Right glute   STM/IASTM 10  Right quadriceps (VL, RF), patellar tendon   I\   Right Patellar tendon      Right Hip mobs 10     NMR re-education (40215)  Min:   CUES NEEDED   Maldivian/Biofeedback 10/10      BFR      G. Med activation      Hip Ext full ROM/ G. Activation      Bosu Bal and Prop- G Med      Single leg stance/Balance/Prop      Bosu Retro G. Med act      Prone Hip froggers- sliders/elevated      Lateral hip Slider Therapeutic Activity (05226)  Min:       Ladders      Plyos      Dynamic Balance      Step up with sport cord (blue) Knee drive/layup with slow eccentric lower               Spoke with   regarding the use of Dry Needling      Dry needling manual therapy: consisted on the placement of 6 needles in the following muscles: right lateral quad, reji-tendon space patellar tendon. A 30-50 mm needle was inserted, piston, rotated, and coned to produce intramuscular mobilization. These techniques were used to restore functional range of motion, reduce muscle spasm and induce healing in the corresponding musculature. (00668)  Clean Technique was utilized today while applying Dry needling treatment. The treatment sites where cleaned with 70% solution of  isopropyl alcohol . The PT washed their hands and utilized treatment gloves along with hand  prior to inserting the needles. All needles where removed and discarded in the appropriate sharps container. MD has given verbal and/or written approval for this treatment. Attended low frequency (1-20Hz) electrical stimulation was utilized in conjunction with Dry Needling:  the Estim was manipulated between all above needles for a period of 10 min. at 4-6 volts. The low frequency electrical stimulation was used to help reduce muscle spasm and help to interrupt /Todd the pain cycle.  (85393)        Therapeutic Exercise and NMR EXR  [x] (48913) Provided verbal/tactile cueing for activities related to strengthening, flexibility, endurance, ROM for improvements in LE, proximal hip, and core control with self care, mobility, lifting, ambulation. [x] (78049) Provided verbal/tactile cueing for activities related to improving balance, coordination, kinesthetic sense, posture, motor skill, proprioception  to assist with LE, proximal hip, and core control in self care, mobility, lifting, ambulation and eccentric single leg control.      NMR and Therapeutic Activities:    [x] (54961 or 54155) Provided verbal/tactile cueing for activities related to improving balance, coordination, kinesthetic sense, posture, motor skill, proprioception and motor activation to allow for proper function of core, proximal hip and LE with self care and ADLs and functional mobility.   [] (20079) Gait Re-education- Provided training and instruction to the patient for proper LE, core and proximal hip recruitment and positioning and eccentric body weight control with ambulation re-education including up and down stairs     Home Exercise Program:    [x] (96597) Reviewed/Progressed HEP activities related to strengthening, flexibility, endurance, ROM of core, proximal hip and LE for functional self-care, mobility, lifting and ambulation/stair navigation   [] (03877)Reviewed/Progressed HEP activities related to improving balance, coordination, kinesthetic sense, posture, motor skill, proprioception of core, proximal hip and LE for self care, mobility, lifting, and ambulation/stair navigation      Manual Treatments:  PROM / STM / Oscillations-Mobs:  G-I, II, III, IV (PA's, Inf., Post.)  [x] (35817) Provided manual therapy to mobilize LE, proximal hip and/or LS spine soft tissue/joints for the purpose of modulating pain, promoting relaxation,  increasing ROM, reducing/eliminating soft tissue swelling/inflammation/restriction, improving soft tissue extensibility and allowing for proper ROM for normal function with self care, mobility, lifting and ambulation. Modalities:  ice wrapped on knees to go. [] GAME READY (VASO)- for significant edema, swelling, pain control. Charges:  Timed Code Treatment Minutes: 30   Total Treatment Minutes: 30      [] EVAL (LOW) 25297 (typically 20 minutes face-to-face)  [] EVAL (MOD) 34857 (typically 30 minutes face-to-face)  [] EVAL (HIGH) 10915 (typically 45 minutes face-to-face)  [] RE-EVAL      [x] DN(16523) x 1    [] DRY NEEDLE 1 OR 2 MUSCLES  [] NMR (33408) x     [] DRY NEEDLE 3+ MUSCLES  [x] Manual (21177) x  1    [] TA (03353) x     [] Mech Traction (41071)  [] ES(attended) (88105)     [] ES (un) (50376):   [] VASO (14411)  [] Other:    If [facility] Please Indicate Time In/Out  CPT Code Time in Time out                                   GOALS:  Patient stated goal: Return to pain free sport activities. [x] Progressing: [] Met: [] Not Met: [] Adjusted    Therapist goals for Patient:   Short Term Goals: To be achieved in: 2 weeks  1. Independent in HEP and progression per patient tolerance, in order to prevent re-injury. [] Progressing: [x] Met: [] Not Met: [] Adjusted  2. Patient will have a decrease in pain to facilitate improvement in movement, function, and ADLs as indicated by Functional Deficits. [] Progressing: [x] Met: [] Not Met: [] Adjusted    Long Term Goals: To be achieved in: 8 weeks  1. Disability index score of 10% or less for the LEFS to assist with reaching prior level of function. [] Progressing: [x] Met: [] Not Met: [] Adjusted  2. Patient will demonstrate increased AROM for hip extension during functional length assessment Santiago Best Test) allow for proper joint functioning as indicated by patients Functional Deficits. [] Progressing: [x] Met: [] Not Met: [] Adjusted  3. Patient will demonstrate an increase in Strength to good proximal hip strength and control, within 5lb HHD in LE to allow for proper functional mobility as indicated by patients Functional Deficits.    [] Progressing: [x] Met: [] Not Met: [] Adjusted  4. Patient will return to Jumping/Landing/Pivoting functional activities without increased symptoms or restriction. [] Progressing: [x] Met: [] Not Met: [] Adjusted       ASSESSMENT:  Patient continues to benefit from manual treatment and pelvic dissociation exercises to get through season. Return to Play: (if applicable)   []  Stage 1: Intro to Strength   []  Stage 2: Return to Run and Strength   []  Stage 3: Return to Jump and Strength   []  Stage 4: Dynamic Strength and Agility   []  Stage 5: Sport Specific Training     []  Ready to Return to Play, Meets All Above Stages   []  Not Ready for Return to Sports   Comments:            Treatment/Activity Tolerance:  [x] Patient tolerated treatment well [] Patient limited by fatique  [] Patient limited by pain  [] Patient limited by other medical complications  [] Other:     Overall Progression Towards Functional goals/ Treatment Progress Update:  [x] Patient is progressing as expected towards functional goals listed. [] Progression is slowed due to complexities/Impairments listed. [] Progression has been slowed due to co-morbidities. [] Plan just implemented, too soon to assess goals progression <30days   [] Goals require adjustment due to lack of progress  [] Patient is not progressing as expected and requires additional follow up with physician  [] Other    Prognosis for POC: [x] Good [] Fair  [] Poor    Patient requires continued skilled intervention: [x] Yes  [] No        PLAN:    [x] Continue per plan of care [] Alter current plan (see comments)  [] Plan of care initiated [] Hold pending MD visit [] Discharge    Electronically signed by:     Jorgito Brownlee PT      Note: If patient does not return for scheduled/recommended follow up visits, this note will serve as a discharge from care along with the most recent update on progress.

## 2023-02-24 ENCOUNTER — HOSPITAL ENCOUNTER (OUTPATIENT)
Dept: PHYSICAL THERAPY | Age: 23
Setting detail: THERAPIES SERIES
Discharge: HOME | End: 2023-02-24
Payer: COMMERCIAL

## 2023-02-24 PROCEDURE — 97140 MANUAL THERAPY 1/> REGIONS: CPT

## 2023-02-24 PROCEDURE — 97032 APPL MODALITY 1+ESTIM EA 15: CPT

## 2023-02-24 PROCEDURE — 97110 THERAPEUTIC EXERCISES: CPT

## 2023-02-24 PROCEDURE — 20560 NDL INSJ W/O NJX 1 OR 2 MUSC: CPT

## 2023-02-24 NOTE — FLOWSHEET NOTE
[facility] [address]  Phone: [phone] Fax: [phone]            Date:  2023    Patient Name:  Nelly Esparza    :  2000  MRN: [id number]  Restrictions/Precautions:    Medical/Treatment Diagnosis Information:  Diagnosis: right patellar tendonitis, left quad tendon partial tear  Treatment Diagnosis: M25.561, N37.296  Insurance/Certification information:  PT Insurance Information: BCBS/[place]  Physician Information:  Referring Practitioner: Jennifer Epstein MD  Plan of care signed (Y/N):     Date of Patient follow up with Physician:      Progress Report: []  Yes  [x]  No     Date Range for reporting period:  Beginnin2022  Endin2022    Progress report due (10 Rx/or 30 days whichever is less):      Recertification due (POC duration/ or 90 days whichever is less): 69/044160     Visit # Insurance Allowable Auth Needed   28 60/MU []Yes   [x]No     Latex Allergy:  [x]NO      []YES  Preferred Language for Healthcare:   [x]English       []other:  Functional Scale: LEFS, 50% Date assessed: 2022    Pain level:  Rest- 0/10     SUBJECTIVE:  Patient states both knees are a bit sore through patellar and quad tendons which she attributes to just weaning from the steroid.           OBJECTIVE:   Observation: tender through lateral patellar tendon on right side and through proximal quad tendon on left side  Test measurements:    RESTRICTIONS/PRECAUTIONS: none    Exercises/Interventions:     Therapeutic Ex (16434)   Min: 10 Sets/sec Reps Notes/CUES   Retro Stepper/BIKE      Squat with rotation 2 blue KB   BFR  Blue cuff, 80%, SLR, SL abd, LAQ, HC, squat, HR   Resisted walk    RDL (SL) Back foot on wall   Step Ups c SC 6in box + SC at waist   Hip stretches on sliders, quad stretch 5' Hip ext on leg press 40#   [place]uan squat with slow decel Red KB. 12\" box   Split Squat jumps With Green Resistance into genu valgum. Focus on landing control. Leg Press Ecc Full Range 210# DL, 130SL to fail   Ecc lower to chair with both legs to stand    Lateral step down (dips) 6\"   Glute side walks White at knees   clamshells No band, floating the top leg   Squat to wall 10    Glute med iso's 10 Basketball overhead   EOB alt hip ext c feet off floor 10    Earthquake Front squat  10 Earthquake+2 red kb, wedges under toes- too much anterior loading   Lateral BOSU lunge 10 4in box under foot, cues for posterior chain   Leg press hip ext Slide Lunge Each leg   1/2 kneeling iso quad 2 10 Each side   Pelvis dissociation at wall 2 10    Slider stretches 5     1/2 kneeling hip flexor stretch 30sec 3 bilat   Hip Matrix Randall and ITBand stretching 30 3    Manual Intervention (07393)  Min: 20'      Knee mobs/PROM            Patella Mobs      DN 15'  Bilateral knees   STM/IASTM 10  Right quadriceps (VL, RF), patellar tendon   I\   Right Patellar tendon      Right Hip mobs 10     NMR re-education (71276)  Min:   CUES NEEDED   S[place] Leonean/Biofeedback 10/10      BFR      G. Med activation      Hip Ext full ROM/ G. Activation      Bosu Bal and Prop- G Med      Single leg stance/Balance/Prop      Bosu Retro G. Med act      Prone Hip froggers- sliders/elevated      Lateral hip Slider Therapeutic Activity (64958)  Min:       Ladders      Plyos      Dynamic Balance      Step up with sport cord (blue) Knee drive/layup with slow eccentric lower               Spoke with   regarding the use of Dry Needling      Dry needling manual therapy: consisted on the placement of 7 needles in the following muscles: right lquad tendon, reji-tendon space left patellar tendon & VMO/intermedius. A 30-50 mm needle was inserted, piston, rotated, and coned to produce intramuscular mobilization. These techniques were used to restore functional range of motion, reduce muscle spasm and induce healing in the corresponding musculature.  (36277)  Clean Technique was utilized today while applying Dry needling treatment. The treatment sites where cleaned with 70% solution of  isopropyl alcohol . The PT washed their hands and utilized treatment gloves along with hand  prior to inserting the needles. All needles where removed and discarded in the appropriate sharps container. MD has given verbal and/or written approval for this treatment. Attended low frequency (1-20Hz) electrical stimulation was utilized in conjunction with Dry Needling:  the Estim was manipulated between all above needles for a period of 10 min. at 4-6 volts. The low frequency electrical stimulation was used to help reduce muscle spasm and help to interrupt /[place] the pain cycle. (82600)        Therapeutic Exercise and NMR EXR  [x] (20246) Provided verbal/tactile cueing for activities related to strengthening, flexibility, endurance, ROM for improvements in LE, proximal hip, and core control with self care, mobility, lifting, ambulation. [x] (51512) Provided verbal/tactile cueing for activities related to improving balance, coordination, kinesthetic sense, posture, motor skill, proprioception  to assist with LE, proximal hip, and core control in self care, mobility, lifting, ambulation and eccentric single leg control.      NMR and Therapeutic Activities:    [x] (95412 or 15558) Provided verbal/tactile cueing for activities related to improving balance, coordination, kinesthetic sense, posture, motor skill, proprioception and motor activation to allow for proper function of core, proximal hip and LE with self care and ADLs and functional mobility.   [] (43429) Gait Re-education- Provided training and instruction to the patient for proper LE, core and proximal hip recruitment and positioning and eccentric body weight control with ambulation re-education including up and down stairs     Home Exercise Program:    [x] (49302) Reviewed/Progressed HEP activities related to strengthening, flexibility, endurance, ROM of core, proximal hip and LE for functional self-care, mobility, lifting and ambulation/stair navigation   [] (47505)Reviewed/Progressed HEP activities related to improving balance, coordination, kinesthetic sense, posture, motor skill, proprioception of core, proximal hip and LE for self care, mobility, lifting, and ambulation/stair navigation      Manual Treatments:  PROM / STM / Oscillations-Mobs:  G-I, II, III, IV (PA's, Inf., Post.)  [x] (81287) Provided manual therapy to mobilize LE, proximal hip and/or LS spine soft tissue/joints for the purpose of modulating pain, promoting relaxation,  increasing ROM, reducing/eliminating soft tissue swelling/inflammation/restriction, improving soft tissue extensibility and allowing for proper ROM for normal function with self care, mobility, lifting and ambulation. Modalities:  ice wrapped on knees to go. [] GAME READY (VASO)- for significant edema, swelling, pain control. Charges:  Timed Code Treatment Minutes: 30   Total Treatment Minutes: 30      [] EVAL (LOW) 88480 (typically 20 minutes face-to-face)  [] EVAL (MOD) 41724 (typically 30 minutes face-to-face)  [] EVAL (HIGH) 20551 (typically 45 minutes face-to-face)  [] RE-EVAL      [x] SS(85976) x 1    [x] DRY NEEDLE 1 OR 2 MUSCLES  [] NMR (83668) x     [] DRY NEEDLE 3+ MUSCLES  [x] Manual (36787) x  1    [] TA (92517) x     [] Mech Traction (37756)  [x] ES(attended) (83755)     [] ES (un) (33016):   [] VASO (11914)  [] Other:    If [facility] Please Indicate Time In/Out  CPT Code Time in Time out                                   GOALS:  Patient stated goal: Return to pain free sport activities. [x] Progressing: [] Met: [] Not Met: [] Adjusted    Therapist goals for Patient:   Short Term Goals: To be achieved in: 2 weeks  1. Independent in HEP and progression per patient tolerance, in order to prevent re-injury. [] Progressing: [x] Met: [] Not Met: [] Adjusted  2.  Patient will have a decrease in pain to facilitate improvement in movement, function, and ADLs as indicated by Functional Deficits. [] Progressing: [x] Met: [] Not Met: [] Adjusted    Long Term Goals: To be achieved in: 8 weeks  1. Disability index score of 10% or less for the LEFS to assist with reaching prior level of function. [] Progressing: [x] Met: [] Not Met: [] Adjusted  2. Patient will demonstrate increased AROM for hip extension during functional length assessment Toña Walker Test) allow for proper joint functioning as indicated by patients Functional Deficits. [] Progressing: [x] Met: [] Not Met: [] Adjusted  3. Patient will demonstrate an increase in Strength to good proximal hip strength and control, within 5lb HHD in LE to allow for proper functional mobility as indicated by patients Functional Deficits. [] Progressing: [x] Met: [] Not Met: [] Adjusted  4. Patient will return to Jumping/Landing/Pivoting functional activities without increased symptoms or restriction. [] Progressing: [x] Met: [] Not Met: [] Adjusted       ASSESSMENT:  Patient has continued difficulty with split squat isometric. Getting through basketball season with some tenderness. Return to Play: (if applicable)   []  Stage 1: Intro to Strength   []  Stage 2: Return to Run and Strength   []  Stage 3: Return to Jump and Strength   []  Stage 4: Dynamic Strength and Agility   []  Stage 5: Sport Specific Training     []  Ready to Return to Play, Meets All Above Stages   []  Not Ready for Return to Sports   Comments:            Treatment/Activity Tolerance:  [x] Patient tolerated treatment well [] Patient limited by fatique  [] Patient limited by pain  [] Patient limited by other medical complications  [] Other:     Overall Progression Towards Functional goals/ Treatment Progress Update:  [x] Patient is progressing as expected towards functional goals listed. [] Progression is slowed due to complexities/Impairments listed.   [] Progression has been slowed due to co-morbidities. [] Plan just implemented, too soon to assess goals progression <30days   [] Goals require adjustment due to lack of progress  [] Patient is not progressing as expected and requires additional follow up with physician  [] Other    Prognosis for POC: [x] Good [] Fair  [] Poor    Patient requires continued skilled intervention: [x] Yes  [] No        PLAN:    [x] Continue per plan of care [] Alter current plan (see comments)  [] Plan of care initiated [] Hold pending MD visit [] Discharge    Electronically signed by:     Han Harris PT      Note: If patient does not return for scheduled/recommended follow up visits, this note will serve as a discharge from care along with the most recent update on progress.

## 2023-03-03 ENCOUNTER — APPOINTMENT (OUTPATIENT)
Dept: PHYSICAL THERAPY | Age: 23
End: 2023-03-03
Payer: COMMERCIAL

## 2023-03-10 ENCOUNTER — HOSPITAL ENCOUNTER (OUTPATIENT)
Dept: PHYSICAL THERAPY | Age: 23
Setting detail: THERAPIES SERIES
Discharge: HOME | End: 2023-03-10
Payer: COMMERCIAL

## 2023-03-10 PROCEDURE — 97140 MANUAL THERAPY 1/> REGIONS: CPT

## 2023-03-10 PROCEDURE — 97112 NEUROMUSCULAR REEDUCATION: CPT

## 2023-03-10 PROCEDURE — 97110 THERAPEUTIC EXERCISES: CPT

## 2023-03-10 NOTE — FLOWSHEET NOTE
[place] [address] [facility] [address]  Phone: [phone] Fax: [phone]            Date:  3/10/2023    Patient Name:  Francisco Khan    :  2000  MRN: [id number]  Restrictions/Precautions:    Medical/Treatment Diagnosis Information:  Diagnosis: right patellar tendonitis, left quad tendon partial tear  Treatment Diagnosis: M25.561, X85.915  Insurance/Certification information:  PT Insurance Information: BCBS/AG/[place]  Physician Information:  Referring Practitioner: Shirlene Black MD  Plan of care signed (Y/N):     Date of Patient follow up with Physician:      Progress Report: []  Yes  [x]  No     Date Range for reporting period:  Beginnin2022  Endin2022    Progress report due (10 Rx/or 30 days whichever is less):      Recertification due (POC duration/ or 90 days whichever is less):      Visit # Insurance Allowable Auth Needed   28 60/MU []Yes   [x]No     Latex Allergy:  [x]NO      []YES  Preferred Language for Healthcare:   [x]English       []other:  Functional Scale: LEFS, 50% Date assessed: 2022    Pain level:  Rest- 0/10     SUBJECTIVE:  Patient states both knees are a bit sore through patellar and quad tendons which she attributes to just weaning from the steroid.           OBJECTIVE:   Observation: tender through lateral patellar tendon on right side and through proximal quad tendon on left side  Test measurements:    RESTRICTIONS/PRECAUTIONS: none    Exercises/Interventions:     Therapeutic Ex (26483)   Min: 10 Sets/sec Reps Notes/CUES   Retro Stepper/BIKE      Squat with rotation 2 blue KB   BFR  Blue cuff, 80%, SLR, SL abd, LAQ, HC, squat, HR   Resisted walk    RDL (SL) Back foot on wall   Step Ups c SC 6in box + SC at waist   Hip stretches on sliders, quad stretch 5' Hip ext on leg press 40#   Qatari squat with slow decel Red KB. 12\" box   Split Squat jumps With Green Resistance into genu valgum. Focus on landing control. Leg Press Ecc Full Range 210# DL, 130SL to fail   Ecc lower to chair with both legs to stand    Lateral step down (dips) 6\"   Glute side walks White at knees   clamshells No band, floating the top leg   Squat to wall 10    Glute med iso's 10 Basketball overhead   EOB alt hip ext c feet off floor 10    Earthquake Front squat  10 Earthquake+2 red kb, wedges under toes- too much anterior loading   Lateral BOSU lunge 10 4in box under foot, cues for posterior chain   Leg press hip ext Slide Lunge Each leg   1/2 kneeling iso quad 2 10 Each side   Pelvis dissociation at wall 2 10    Slider stretches 5     1/2 kneeling hip flexor stretch 30sec 3 bilat   Hip Matrix Randall and ITBand stretching 30 3    Manual Intervention (53146)  Min: 20'      Knee mobs/PROM            Patella Mobs      DN 15'  Bilateral knees   STM/IASTM 10  Right quadriceps (VL, RF), patellar tendon   I\   Right Patellar tendon      Right Hip mobs 10     NMR re-education (34249)  Min:   CUES NEEDED   Tunisian/Biofeedback 10/10      BFR      G. Med activation      Hip Ext full ROM/ G. Activation      Bosu Bal and Prop- G Med      Single leg stance/Balance/Prop      Bosu Retro G. Med act      Prone Hip froggers- sliders/elevated      Lateral hip Slider Therapeutic Activity (67866)  Min:       Ladders      Plyos      Dynamic Balance      Step up with sport cord (blue) Knee drive/layup with slow eccentric lower               Spoke with   regarding the use of Dry Needling      Dry needling manual therapy: consisted on the placement of 7 needles in the following muscles: right lquad tendon, reji-tendon space left patellar tendon & VMO/intermedius. A 30-50 mm needle was inserted, piston, rotated, and coned to produce intramuscular mobilization. These techniques were used to restore functional range of motion, reduce muscle spasm and induce healing in the corresponding musculature.  (89919)  Clean Technique was utilized today while applying Dry needling treatment. The treatment sites where cleaned with 70% solution of  isopropyl alcohol . The PT washed their hands and utilized treatment gloves along with hand  prior to inserting the needles. All needles where removed and discarded in the appropriate sharps container. MD has given verbal and/or written approval for this treatment. Attended low frequency (1-20Hz) electrical stimulation was utilized in conjunction with Dry Needling:  the Estim was manipulated between all above needles for a period of 10 min. at 4-6 volts. The low frequency electrical stimulation was used to help reduce muscle spasm and help to interrupt /Warren the pain cycle. (60678)        Therapeutic Exercise and NMR EXR  [x] (12649) Provided verbal/tactile cueing for activities related to strengthening, flexibility, endurance, ROM for improvements in LE, proximal hip, and core control with self care, mobility, lifting, ambulation. [x] (75519) Provided verbal/tactile cueing for activities related to improving balance, coordination, kinesthetic sense, posture, motor skill, proprioception  to assist with LE, proximal hip, and core control in self care, mobility, lifting, ambulation and eccentric single leg control.      NMR and Therapeutic Activities:    [x] (30776 or 35777) Provided verbal/tactile cueing for activities related to improving balance, coordination, kinesthetic sense, posture, motor skill, proprioception and motor activation to allow for proper function of core, proximal hip and LE with self care and ADLs and functional mobility.   [] (66812) Gait Re-education- Provided training and instruction to the patient for proper LE, core and proximal hip recruitment and positioning and eccentric body weight control with ambulation re-education including up and down stairs     Home Exercise Program:    [x] (75450) Reviewed/Progressed HEP activities related to strengthening, flexibility, endurance, ROM of core, proximal hip and LE for functional self-care, mobility, lifting and ambulation/stair navigation   [] (67552)Reviewed/Progressed HEP activities related to improving balance, coordination, kinesthetic sense, posture, motor skill, proprioception of core, proximal hip and LE for self care, mobility, lifting, and ambulation/stair navigation      Manual Treatments:  PROM / STM / Oscillations-Mobs:  G-I, II, III, IV (PA's, Inf., Post.)  [x] (65389) Provided manual therapy to mobilize LE, proximal hip and/or LS spine soft tissue/joints for the purpose of modulating pain, promoting relaxation,  increasing ROM, reducing/eliminating soft tissue swelling/inflammation/restriction, improving soft tissue extensibility and allowing for proper ROM for normal function with self care, mobility, lifting and ambulation.     Modalities:  ice wrapped on knees to go.   [] GAME READY (VASO)- for significant edema, swelling, pain control.     Charges:  Timed Code Treatment Minutes: 30   Total Treatment Minutes: 30      [] EVAL (LOW) 11360 (typically 20 minutes face-to-face)  [] EVAL (MOD) 15888 (typically 30 minutes face-to-face)  [] EVAL (HIGH) 91992 (typically 45 minutes face-to-face)  [] RE-EVAL      [x] TE(23827) x 1    [x] DRY NEEDLE 1 OR 2 MUSCLES  [] NMR (75465) x     [] DRY NEEDLE 3+ MUSCLES  [x] Manual (94554) x  1    [] TA (81969) x     [] Mech Traction (42244)  [x] ES(attended) (18241)     [] ES (un) (77406):   [] VASO (73978)  [] Other:    If BW Please Indicate Time In/Out  CPT Code Time in Time out                                   GOALS:  Patient stated goal: Return to pain free sport activities.  [x] Progressing: [] Met: [] Not Met: [] Adjusted    Therapist goals for Patient:   Short Term Goals: To be achieved in: 2 weeks  1. Independent in HEP and progression per patient tolerance, in order to prevent re-injury.   [] Progressing: [x] Met: [] Not Met: [] Adjusted  2. Patient will have a  decrease in pain to facilitate improvement in movement, function, and ADLs as indicated by Functional Deficits. [] Progressing: [x] Met: [] Not Met: [] Adjusted    Long Term Goals: To be achieved in: 8 weeks  1. Disability index score of 10% or less for the LEFS to assist with reaching prior level of function. [] Progressing: [x] Met: [] Not Met: [] Adjusted  2. Patient will demonstrate increased AROM for hip extension during functional length assessment Verita Clos Test) allow for proper joint functioning as indicated by patients Functional Deficits. [] Progressing: [x] Met: [] Not Met: [] Adjusted  3. Patient will demonstrate an increase in Strength to good proximal hip strength and control, within 5lb HHD in LE to allow for proper functional mobility as indicated by patients Functional Deficits. [] Progressing: [x] Met: [] Not Met: [] Adjusted  4. Patient will return to Jumping/Landing/Pivoting functional activities without increased symptoms or restriction. [] Progressing: [x] Met: [] Not Met: [] Adjusted       ASSESSMENT:  Patient has continued difficulty with split squat isometric. Getting through basketball season with some tenderness. Return to Play: (if applicable)   []  Stage 1: Intro to Strength   []  Stage 2: Return to Run and Strength   []  Stage 3: Return to Jump and Strength   []  Stage 4: Dynamic Strength and Agility   []  Stage 5: Sport Specific Training     []  Ready to Return to Play, Meets All Above Stages   []  Not Ready for Return to Sports   Comments:            Treatment/Activity Tolerance:  [x] Patient tolerated treatment well [] Patient limited by fatique  [] Patient limited by pain  [] Patient limited by other medical complications  [] Other:     Overall Progression Towards Functional goals/ Treatment Progress Update:  [x] Patient is progressing as expected towards functional goals listed. [] Progression is slowed due to complexities/Impairments listed.   [] Progression has been slowed due to co-morbidities. [] Plan just implemented, too soon to assess goals progression <30days   [] Goals require adjustment due to lack of progress  [] Patient is not progressing as expected and requires additional follow up with physician  [] Other    Prognosis for POC: [x] Good [] Fair  [] Poor    Patient requires continued skilled intervention: [x] Yes  [] No        PLAN:    [x] Continue per plan of care [] Alter current plan (see comments)  [] Plan of care initiated [] Hold pending MD visit [] Discharge    Electronically signed by:     Abdiel Herrera PT      Note: If patient does not return for scheduled/recommended follow up visits, this note will serve as a discharge from care along with the most recent update on progress.

## 2023-05-03 ENCOUNTER — HOSPITAL ENCOUNTER (OUTPATIENT)
Dept: PHYSICAL THERAPY | Age: 23
Setting detail: THERAPIES SERIES
Discharge: HOME | End: 2023-05-03
Payer: COMMERCIAL

## 2023-05-03 PROCEDURE — L3020 FOOT LONGITUD/METATARSAL SUP: HCPCS

## 2023-05-03 NOTE — FLOWSHEET NOTE
will have a decrease in pain to facilitate improvement in movement, function, and ADLs as indicated by Functional Deficits. [] Progressing: [x] Met: [] Not Met: [] Adjusted    Long Term Goals: To be achieved in: 8 weeks  1. Disability index score of 10% or less for the LEFS to assist with reaching prior level of function. [] Progressing: [x] Met: [] Not Met: [] Adjusted  2. Patient will demonstrate increased AROM for hip extension during functional length assessment Adelaida Jeronimo Test) allow for proper joint functioning as indicated by patients Functional Deficits. [] Progressing: [x] Met: [] Not Met: [] Adjusted  3. Patient will demonstrate an increase in Strength to good proximal hip strength and control, within 5lb HHD in LE to allow for proper functional mobility as indicated by patients Functional Deficits. [] Progressing: [x] Met: [] Not Met: [] Adjusted  4. Patient will return to Jumping/Landing/Pivoting functional activities without increased symptoms or restriction. [] Progressing: [x] Met: [] Not Met: [] Adjusted       ASSESSMENT:  Patient has continued difficulty with split squat isometric. Now that season is over, would like to focus on strength and getting new orthotic. Return to Play: (if applicable)   []  Stage 1: Intro to Strength   []  Stage 2: Return to Run and Strength   []  Stage 3: Return to Jump and Strength   []  Stage 4: Dynamic Strength and Agility   []  Stage 5: Sport Specific Training     []  Ready to Return to Play, Meets All Above Stages   []  Not Ready for Return to Sports   Comments:            Treatment/Activity Tolerance:  [x] Patient tolerated treatment well [] Patient limited by fatique  [] Patient limited by pain  [] Patient limited by other medical complications  [] Other:     Overall Progression Towards Functional goals/ Treatment Progress Update:  [x] Patient is progressing as expected towards functional goals listed.     [] Progression is slowed due to

## 2023-05-05 ENCOUNTER — HOSPITAL ENCOUNTER (OUTPATIENT)
Dept: PHYSICAL THERAPY | Age: 23
Setting detail: THERAPIES SERIES
Discharge: HOME | End: 2023-05-05
Payer: COMMERCIAL

## 2023-05-05 PROCEDURE — 97140 MANUAL THERAPY 1/> REGIONS: CPT

## 2023-05-05 PROCEDURE — 97110 THERAPEUTIC EXERCISES: CPT

## 2023-05-05 NOTE — FLOWSHEET NOTE
soon to assess goals progression <30days   [] Goals require adjustment due to lack of progress  [] Patient is not progressing as expected and requires additional follow up with physician  [] Other    Prognosis for POC: [x] Good [] Fair  [] Poor    Patient requires continued skilled intervention: [x] Yes  [] No        PLAN:    [x] Continue per plan of care [] Alter current plan (see comments)  [] Plan of care initiated [] Hold pending MD visit [] Discharge    Electronically signed by:     Ruslan Francois PT      Note: If patient does not return for scheduled/recommended follow up visits, this note will serve as a discharge from care along with the most recent update on progress.

## 2023-05-08 ENCOUNTER — HOSPITAL ENCOUNTER (OUTPATIENT)
Dept: PHYSICAL THERAPY | Age: 23
Setting detail: THERAPIES SERIES
Discharge: HOME | End: 2023-05-08
Payer: COMMERCIAL

## 2023-05-08 PROCEDURE — 97140 MANUAL THERAPY 1/> REGIONS: CPT

## 2023-05-08 PROCEDURE — 97110 THERAPEUTIC EXERCISES: CPT

## 2023-05-08 NOTE — FLOWSHEET NOTE
[place] [address] [place] [address]  Phone: [phone] Fax: [phone]            Date:  2023    Patient Name:  Mary Marquez    :  2000  MRN: [id number]  Restrictions/Precautions:    Medical/Treatment Diagnosis Information:  Diagnosis: right patellar tendonitis, left quad tendon partial tear  Treatment Diagnosis: M25.561, N18.941  Insurance/Certification information:  PT Insurance Information: BCBS/AG/[place]  Physician Information:  Referring Practitioner: Hector Lorenzo MD  Plan of care signed (Y/N):     Date of Patient follow up with Physician:      Progress Report: []  Yes  [x]  No     Date Range for reporting period:  Beginnin2022  Endin2022    Progress report due (10 Rx/or 30 days whichever is less):      Recertification due (POC duration/ or 90 days whichever is less): [id number]     Visit # Insurance Allowable Auth Needed   33 60/MU []Yes   [x]No     Latex Allergy:  [x]NO      []YES  Preferred Language for Healthcare:   [x]English       []other:  Functional Scale: LEFS, 50% Date assessed: 2022    Pain level:  Rest- 0/10     SUBJECTIVE:  [facility] states          OBJECTIVE:   Observation:     RESTRICTIONS/PRECAUTIONS: none    Exercises/Interventions:     Therapeutic Ex (39303)   Min: 30 Sets/sec Reps Notes/CUES   Retro Stepper/BIKE      Squat with rotation 2 blue KB   SLR 3 way, SAQ, step up 1 20 With BFR   Resisted walk fwd/bwd 1 10 PT resist   CC Cross pull RDL 2 15    RDL Cross reach wt 2 15 1 25#   RDL (SL) 2 10 2 blue KB   BOSU bridge (back on BOSU) 2 10    Hip stretches on sliders, quad stretch 5' Hip ext on leg press 40#   Croatian squat with slow decel Red KB. 12\" box   Split Squat jumps With Green Resistance into genu valgum. Focus on landing control.    Leg Press Ecc Full Range 210# DL, 130SL to fail   SLS in TKE with toss 3 10 On airex   Lateral

## 2023-05-10 ENCOUNTER — HOSPITAL ENCOUNTER (OUTPATIENT)
Dept: PHYSICAL THERAPY | Age: 23
Setting detail: THERAPIES SERIES
Discharge: HOME | End: 2023-05-10
Payer: COMMERCIAL

## 2023-05-10 PROCEDURE — 97112 NEUROMUSCULAR REEDUCATION: CPT

## 2023-05-10 PROCEDURE — 97110 THERAPEUTIC EXERCISES: CPT

## 2023-05-10 PROCEDURE — 97140 MANUAL THERAPY 1/> REGIONS: CPT

## 2023-05-10 NOTE — FLOWSHEET NOTE
navigation   [] (28881)Reviewed/Progressed HEP activities related to improving balance, coordination, kinesthetic sense, posture, motor skill, proprioception of core, proximal hip and LE for self care, mobility, lifting, and ambulation/stair navigation      Manual Treatments:  PROM / STM / Oscillations-Mobs:  G-I, II, III, IV (PA's, Inf., Post.)  [x] (10421) Provided manual therapy to mobilize LE, proximal hip and/or LS spine soft tissue/joints for the purpose of modulating pain, promoting relaxation,  increasing ROM, reducing/eliminating soft tissue swelling/inflammation/restriction, improving soft tissue extensibility and allowing for proper ROM for normal function with self care, mobility, lifting and ambulation. Modalities:  ice wrapped on knees to go. [] GAME READY (VASO)- for significant edema, swelling, pain control. Charges:  Timed Code Treatment Minutes: 50   Total Treatment Minutes: 50      [] EVAL (LOW) 33069 (typically 20 minutes face-to-face)  [] EVAL (MOD) 68131 (typically 30 minutes face-to-face)  [] EVAL (HIGH) 69406 (typically 45 minutes face-to-face)  [] RE-EVAL      [x] HZ(78006) x 1    [] DRY NEEDLE 1 OR 2 MUSCLES  [x] NMR (40386) x 1    [] DRY NEEDLE 3+ MUSCLES  [x] Manual (63107) x  1    [] TA (29332) x     [] Mech Traction (27006)  [] ES(attended) (94756)     [] ES (un) (94852):   [] VASO (35365)  [] Other:    If [facility] Please Indicate Time In/Out  CPT Code Time in Time out                                   GOALS:  Patient stated goal: Return to pain free sport activities. [x] Progressing: [] Met: [] Not Met: [] Adjusted    Therapist goals for Patient:   Short Term Goals: To be achieved in: 2 weeks  1. Independent in HEP and progression per patient tolerance, in order to prevent re-injury. [] Progressing: [x] Met: [] Not Met: [] Adjusted  2. Patient will have a decrease in pain to facilitate improvement in movement, function, and ADLs as indicated by Functional Deficits.   []

## 2023-05-15 ENCOUNTER — HOSPITAL ENCOUNTER (OUTPATIENT)
Dept: PHYSICAL THERAPY | Age: 23
Setting detail: THERAPIES SERIES
Discharge: HOME | End: 2023-05-15
Payer: COMMERCIAL

## 2023-05-15 PROCEDURE — 97110 THERAPEUTIC EXERCISES: CPT

## 2023-05-15 PROCEDURE — 97140 MANUAL THERAPY 1/> REGIONS: CPT

## 2023-05-15 NOTE — FLOWSHEET NOTE
[place] [address] [facility]  Phone: [phone] Fax: [phone]            Date:  5/15/2023    Patient Name:  Alicja Valdez    :  2000  MRN: [id number]  Restrictions/Precautions:    Medical/Treatment Diagnosis Information:  Diagnosis: right patellar tendonitis, left quad tendon partial tear  Treatment Diagnosis: M25.561, Y63.853  Insurance/Certification information:  PT Insurance Information: BCBS/AG/[place]  Physician Information:  Referring Practitioner: Becky Becerra MD  Plan of care signed (Y/N):     Date of Patient follow up with Physician:      Progress Report: []  Yes  [x]  No     Date Range for reporting period:  Beginnin2022  Endin2022    Progress report due (10 Rx/or 30 days whichever is less):      Recertification due (POC duration/ or 90 days whichever is less): [id number]     Visit # Insurance Allowable Auth Needed   35 60/MU []Yes   [x]No     Latex Allergy:  [x]NO      []YES  Preferred Language for Healthcare:   [x]English       []other:  Functional Scale: LEFS, 50% Date assessed: 2022    Pain level:  Rest- 0/10     SUBJECTIVE:  Patient states right knee is getting much better. She felt better after some rest for the last couple days.             OBJECTIVE:   Observation: tender through patellar tendon  Improved hip motion noted    RESTRICTIONS/PRECAUTIONS: none    Exercises/Interventions:     Therapeutic Ex (49323)   Min: 30 Sets/sec Reps Notes/CUES   Retro Stepper/BIKE      Squat with rotation 2 blue KB   SLR 3 way, SAQ, step up With BFR   Squat to chair 1 10 Focus on ecc   Steamboats on airex 2 10 Each way/each leg   Bridge on BOSU 10\" 10    RDL with TKE (kickstand) 2 10 Each leg/ 2 blue KB         Hip stretches on sliders, quad stretch 5' Hip ext on leg press 40#   Slovak squat with slow decel Red KB. 12\" box   Split Squat iso lift 5\" 10ea    Leg Press Ecc Full Range 1 15

## 2023-05-17 ENCOUNTER — APPOINTMENT (OUTPATIENT)
Dept: PHYSICAL THERAPY | Age: 23
End: 2023-05-17
Payer: COMMERCIAL

## 2023-05-22 ENCOUNTER — HOSPITAL ENCOUNTER (OUTPATIENT)
Dept: PHYSICAL THERAPY | Age: 23
Setting detail: THERAPIES SERIES
Discharge: HOME | End: 2023-05-22
Payer: COMMERCIAL

## 2023-05-22 PROCEDURE — 97140 MANUAL THERAPY 1/> REGIONS: CPT

## 2023-05-22 PROCEDURE — 97110 THERAPEUTIC EXERCISES: CPT

## 2023-05-22 NOTE — FLOWSHEET NOTE
[facility] [address] [facility] [address]  Phone: [phone] Fax: [phone]            Date:  2023    Patient Name:  Sydney Corbin    :  2000  MRN: [id number]  Restrictions/Precautions:    Medical/Treatment Diagnosis Information:  Diagnosis: right patellar tendonitis, left quad tendon partial tear  Treatment Diagnosis: M25.561, F25.692  Insurance/Certification information:  PT Insurance Information: BCBS//[place]  Physician Information:  Referring Practitioner: Michel Sandhu MD  Plan of care signed (Y/N):     Date of Patient follow up with Physician:      Progress Report: []  Yes  [x]  No     Date Range for reporting period:  Beginnin2022  Endin2022    Progress report due (10 Rx/or 30 days whichever is less):      Recertification due (POC duration/ or 90 days whichever is less):      Visit # Insurance Allowable Auth Needed   35 60/MU []Yes   [x]No     Latex Allergy:  [x]NO      []YES  Preferred Language for Healthcare:   [x]English       []other:  Functional Scale: LEFS, 50% Date assessed: 2022    Pain level:  Rest- 0/10     SUBJECTIVE:  Patient reports that her knees are feeling good, even after practice this morning.     OBJECTIVE:   Observation: tender through patellar tendon  Improved hip motion noted    RESTRICTIONS/PRECAUTIONS: none    Exercises/Interventions:     Therapeutic Ex (25496)   Min: 10' Sets/sec Reps Notes/CUES   Retro Stepper/BIKE      AlterG 10'  70%, walk/run 1:1 cycle   Squat with rotation 2 blue KB   SLR 3 way, SAQ, step up With BFR   Squat to chair Focus on ecc   Steamboats on airex Each way/each leg   Bridge on [employer] with TKE (kickstand) Each leg/ 2 blue KB         Hip stretches on sliders, quad stretch 5' Hip ext on leg press 40#   Samoan squat with slow decel Red KB. 12\" box   Split Squat iso lift    Leg Press Ecc Full Range 1 15 110#   SLS in TKE with toss

## 2023-05-24 ENCOUNTER — APPOINTMENT (OUTPATIENT)
Dept: PHYSICAL THERAPY | Age: 23
End: 2023-05-24
Payer: COMMERCIAL

## 2023-09-15 ENCOUNTER — OFFICE VISIT (OUTPATIENT)
Dept: OBGYN CLINIC | Age: 23
End: 2023-09-15

## 2023-09-15 VITALS
BODY MASS INDEX: 25.09 KG/M2 | WEIGHT: 185.2 LBS | DIASTOLIC BLOOD PRESSURE: 60 MMHG | HEIGHT: 72 IN | SYSTOLIC BLOOD PRESSURE: 106 MMHG

## 2023-09-15 DIAGNOSIS — N76.0 VAGINITIS AND VULVOVAGINITIS: Primary | ICD-10-CM

## 2023-09-15 PROCEDURE — 99204 OFFICE O/P NEW MOD 45 MIN: CPT | Performed by: OBSTETRICS & GYNECOLOGY

## 2023-09-15 RX ORDER — FLUCONAZOLE 150 MG/1
150 TABLET ORAL EVERY OTHER DAY
Qty: 3 TABLET | Refills: 0 | Status: SHIPPED | OUTPATIENT
Start: 2023-09-15

## 2023-09-15 RX ORDER — TINIDAZOLE 500 MG/1
2 TABLET ORAL
Qty: 8 TABLET | Refills: 0 | Status: SHIPPED | OUTPATIENT
Start: 2023-09-15 | End: 2023-09-17

## 2023-09-15 ASSESSMENT — PATIENT HEALTH QUESTIONNAIRE - PHQ9
SUM OF ALL RESPONSES TO PHQ9 QUESTIONS 1 & 2: 0
SUM OF ALL RESPONSES TO PHQ QUESTIONS 1-9: 0
2. FEELING DOWN, DEPRESSED OR HOPELESS: 0
1. LITTLE INTEREST OR PLEASURE IN DOING THINGS: 0
SUM OF ALL RESPONSES TO PHQ QUESTIONS 1-9: 0

## 2023-09-15 NOTE — PROGRESS NOTES
Vaginal discharge and vaginal odor x1 week but worsening. Has used OTC yeast medication without improvement. Last time this happened was about a year ago. No other concerns. Normal EGBSUVVC with adherent whitish discharge. Wet prep collected. No CMT or nodes. Wet prep: Clue cells, pH>4.5, +Whiff, rare hyphal element  Impression: BV with yeast.  Rx x 2 sent with realistic expectations for improvement outlined.

## 2023-09-21 ENCOUNTER — TELEPHONE (OUTPATIENT)
Dept: OBGYN CLINIC | Age: 23
End: 2023-09-21

## 2023-09-21 NOTE — TELEPHONE ENCOUNTER
Patient called stating that her pharmacy had her diflucan rx, however, they did not receive the tindamax. Spoke with pharmacy, they state that the rx was on hold, however, they do have it and are getting it ready for her now. Advised pt of this and she voiced full understanding. All questions answered.

## 2023-11-28 ENCOUNTER — TELEPHONE (OUTPATIENT)
Dept: OBGYN CLINIC | Age: 23
End: 2023-11-28

## 2023-11-28 NOTE — TELEPHONE ENCOUNTER
Patient needs refills for both diflucan 150mg and other medication on file oral meds need refills for both called in by this afternoon patient going out of [facility]  [phone]  Please call into [facility]

## 2023-11-29 NOTE — TELEPHONE ENCOUNTER
Pt walks in today c/o d/c with odor x 1 week. She is aware that she will need an appointment. Appt made for Friday. Call back prn. Voiced full understanding.

## 2023-12-01 ENCOUNTER — OFFICE VISIT (OUTPATIENT)
Dept: OBGYN CLINIC | Age: 23
End: 2023-12-01
Payer: COMMERCIAL

## 2023-12-01 VITALS — WEIGHT: 185.04 LBS | BODY MASS INDEX: 25.06 KG/M2 | HEIGHT: 72 IN

## 2023-12-01 DIAGNOSIS — N76.0 VAGINITIS AND VULVOVAGINITIS: Primary | ICD-10-CM

## 2023-12-01 PROCEDURE — 99214 OFFICE O/P EST MOD 30 MIN: CPT | Performed by: OBSTETRICS & GYNECOLOGY

## 2023-12-01 RX ORDER — TINIDAZOLE 500 MG/1
2 TABLET ORAL
Qty: 8 TABLET | Refills: 3 | Status: SHIPPED | OUTPATIENT
Start: 2023-12-01 | End: 2023-12-03

## 2023-12-01 RX ORDER — FLUCONAZOLE 150 MG/1
150 TABLET ORAL DAILY
Qty: 3 TABLET | Refills: 3 | Status: SHIPPED | OUTPATIENT
Start: 2023-12-01 | End: 2023-12-04

## 2023-12-01 ASSESSMENT — PATIENT HEALTH QUESTIONNAIRE - PHQ9
SUM OF ALL RESPONSES TO PHQ QUESTIONS 1-9: 0
SUM OF ALL RESPONSES TO PHQ QUESTIONS 1-9: 0
SUM OF ALL RESPONSES TO PHQ9 QUESTIONS 1 & 2: 0
SUM OF ALL RESPONSES TO PHQ QUESTIONS 1-9: 0
2. FEELING DOWN, DEPRESSED OR HOPELESS: 0
1. LITTLE INTEREST OR PLEASURE IN DOING THINGS: 0
SUM OF ALL RESPONSES TO PHQ QUESTIONS 1-9: 0

## 2023-12-01 NOTE — PROGRESS NOTES
Patient treated for vaginitis 9 weeks ago successfully. Symptoms recurred earlier this week. No fever or pain. Whitish, tannish, yellowish discharge with odor. Minimal irritation or itching. Desires STD rule out although not suspicious in any particular way. Normal exam with discharge with odor. Nontender on exam.  Nuswab collected as indicated. Empiric treatment for BV with yeast refills authorized.

## 2023-12-05 LAB
A VAGINAE DNA VAG QL NAA+PROBE: ABNORMAL SCORE
BVAB2 DNA VAG QL NAA+PROBE: ABNORMAL SCORE
C ALBICANS DNA VAG QL NAA+PROBE: NEGATIVE
C GLABRATA DNA VAG QL NAA+PROBE: NEGATIVE
C TRACH RRNA SPEC QL NAA+PROBE: NEGATIVE
MEGA1 DNA VAG QL NAA+PROBE: ABNORMAL SCORE
N GONORRHOEA RRNA SPEC QL NAA+PROBE: NEGATIVE
SPECIMEN SOURCE: ABNORMAL
T VAGINALIS RRNA SPEC QL NAA+PROBE: NEGATIVE